# Patient Record
Sex: FEMALE | Race: WHITE | Employment: FULL TIME | URBAN - METROPOLITAN AREA
[De-identification: names, ages, dates, MRNs, and addresses within clinical notes are randomized per-mention and may not be internally consistent; named-entity substitution may affect disease eponyms.]

---

## 2017-01-16 ENCOUNTER — TELEPHONE (OUTPATIENT)
Dept: FAMILY MEDICINE | Facility: CLINIC | Age: 37
End: 2017-01-16

## 2017-01-16 NOTE — TELEPHONE ENCOUNTER
Patient called reporting she has mastitis on right side of left breast. Has tenderness and redness. Denies discharge or fever. Abx was prescribed by gynecologist. Viral symptoms improved but still having tenders now. Currently going thru menses symptoms. Asking for advise if OV needed. Advised pt to finish Rx for abx. Follow up with provider after menstrual cycle if symptoms persist or sooner if new symptoms. She verbalized understanding and agreement with plan.

## 2017-01-16 NOTE — TELEPHONE ENCOUNTER
Reason for call:  Patient reporting a symptom  Symptom or request: a condition patient is trying to fight  Duration (how long have symptoms been present): 1 week  Have you been treated for this before? no  Additional comments: please call patient   Phone Number patient can be reached at:  Home number on file [phone] (home)  Best Time:  any  Can we leave a detailed message on this number:  YES  Call taken on 1/16/2017 at 11:01 AM by VICENTA SOMERS

## 2017-01-20 ENCOUNTER — HOSPITAL ENCOUNTER (OUTPATIENT)
Dept: MAMMOGRAPHY | Facility: CLINIC | Age: 37
End: 2017-01-20
Attending: INTERNAL MEDICINE
Payer: COMMERCIAL

## 2017-01-20 ENCOUNTER — OFFICE VISIT (OUTPATIENT)
Dept: INTERNAL MEDICINE | Facility: CLINIC | Age: 37
End: 2017-01-20
Payer: COMMERCIAL

## 2017-01-20 ENCOUNTER — HOSPITAL ENCOUNTER (OUTPATIENT)
Dept: MAMMOGRAPHY | Facility: CLINIC | Age: 37
Discharge: HOME | End: 2017-01-20
Attending: INTERNAL MEDICINE | Admitting: INTERNAL MEDICINE
Payer: COMMERCIAL

## 2017-01-20 ENCOUNTER — TELEPHONE (OUTPATIENT)
Dept: INTERNAL MEDICINE | Facility: CLINIC | Age: 37
End: 2017-01-20

## 2017-01-20 VITALS
BODY MASS INDEX: 26.36 KG/M2 | TEMPERATURE: 98.5 F | DIASTOLIC BLOOD PRESSURE: 110 MMHG | HEIGHT: 69 IN | SYSTOLIC BLOOD PRESSURE: 160 MMHG | HEART RATE: 87 BPM | OXYGEN SATURATION: 98 % | WEIGHT: 178 LBS

## 2017-01-20 DIAGNOSIS — N61.0 ACUTE MASTITIS OF LEFT BREAST: Primary | ICD-10-CM

## 2017-01-20 DIAGNOSIS — N63.0 BREAST MASS: ICD-10-CM

## 2017-01-20 LAB
ALBUMIN SERPL-MCNC: 3.8 G/DL (ref 3.4–5)
ALP SERPL-CCNC: 94 U/L (ref 40–150)
ALT SERPL W P-5'-P-CCNC: 36 U/L (ref 0–50)
ANION GAP SERPL CALCULATED.3IONS-SCNC: 6 MMOL/L (ref 3–14)
AST SERPL W P-5'-P-CCNC: 30 U/L (ref 0–45)
BASOPHILS # BLD AUTO: 0 10E9/L (ref 0–0.2)
BASOPHILS NFR BLD AUTO: 0.3 %
BILIRUB SERPL-MCNC: 0.4 MG/DL (ref 0.2–1.3)
BUN SERPL-MCNC: 12 MG/DL (ref 7–30)
CALCIUM SERPL-MCNC: 8.9 MG/DL (ref 8.5–10.1)
CHLORIDE SERPL-SCNC: 105 MMOL/L (ref 94–109)
CO2 SERPL-SCNC: 30 MMOL/L (ref 20–32)
CREAT SERPL-MCNC: 0.75 MG/DL (ref 0.52–1.04)
DIFFERENTIAL METHOD BLD: NORMAL
EOSINOPHIL # BLD AUTO: 0.3 10E9/L (ref 0–0.7)
EOSINOPHIL NFR BLD AUTO: 2.7 %
ERYTHROCYTE [DISTWIDTH] IN BLOOD BY AUTOMATED COUNT: 12.1 % (ref 10–15)
GFR SERPL CREATININE-BSD FRML MDRD: 86 ML/MIN/1.7M2
GLUCOSE SERPL-MCNC: 142 MG/DL (ref 70–99)
HCT VFR BLD AUTO: 41.4 % (ref 35–47)
HGB BLD-MCNC: 13.2 G/DL (ref 11.7–15.7)
LYMPHOCYTES # BLD AUTO: 2.1 10E9/L (ref 0.8–5.3)
LYMPHOCYTES NFR BLD AUTO: 22.4 %
MCH RBC QN AUTO: 30.5 PG (ref 26.5–33)
MCHC RBC AUTO-ENTMCNC: 31.9 G/DL (ref 31.5–36.5)
MCV RBC AUTO: 96 FL (ref 78–100)
MONOCYTES # BLD AUTO: 0.6 10E9/L (ref 0–1.3)
MONOCYTES NFR BLD AUTO: 6.8 %
NEUTROPHILS # BLD AUTO: 6.2 10E9/L (ref 1.6–8.3)
NEUTROPHILS NFR BLD AUTO: 67.8 %
PLATELET # BLD AUTO: 358 10E9/L (ref 150–450)
POTASSIUM SERPL-SCNC: 3.8 MMOL/L (ref 3.4–5.3)
PROT SERPL-MCNC: 7.8 G/DL (ref 6.8–8.8)
RBC # BLD AUTO: 4.33 10E12/L (ref 3.8–5.2)
SODIUM SERPL-SCNC: 141 MMOL/L (ref 133–144)
WBC # BLD AUTO: 9.2 10E9/L (ref 4–11)

## 2017-01-20 PROCEDURE — G0204 DX MAMMO INCL CAD BI: HCPCS

## 2017-01-20 PROCEDURE — 76641 ULTRASOUND BREAST COMPLETE: CPT | Mod: LT

## 2017-01-20 PROCEDURE — 80053 COMPREHEN METABOLIC PANEL: CPT | Performed by: INTERNAL MEDICINE

## 2017-01-20 PROCEDURE — 36415 COLL VENOUS BLD VENIPUNCTURE: CPT | Performed by: INTERNAL MEDICINE

## 2017-01-20 PROCEDURE — 85025 COMPLETE CBC W/AUTO DIFF WBC: CPT | Performed by: INTERNAL MEDICINE

## 2017-01-20 PROCEDURE — 99214 OFFICE O/P EST MOD 30 MIN: CPT | Performed by: INTERNAL MEDICINE

## 2017-01-20 NOTE — PATIENT INSTRUCTIONS
Breast ultrasound and mammogram ASAP.    ---    I have placed a breast center referral for you.    Please schedule an appointment at your earliest convenience - phone number below.     If you have any difficulty getting an appointment, please let us know.     ---    Labs - please proceed to our first floor laboratory to have these drawn (show them your orange ticket).     Results:    If normal: we will release results in "[employer] or send them in the mail. You will not be called for these results.    If abnormal, but non-urgent: we will release results in [employer] or send them in the mail. You will not be called for these results.    If abnormal and urgent: we will call you.

## 2017-01-20 NOTE — TELEPHONE ENCOUNTER
Discussed results with patient.    All normal - no evidence of infection or inflammation.     Mammogram and ultrasound later today.    Recs to follow.

## 2017-01-20 NOTE — PROGRESS NOTES
"  SUBJECTIVE:                                                      HPI: Shannen Enamorado is a pleasant 36 year old female who presents with \"mastitis\"    Non-breastfeeding x 2 years.     Had mastitis while breast feeding and one time afterwards. Has always involved left central breast.     Re: current episode:  - started a ~1.5 weeks ago - thinks it was precipitated by lymphatic-focused massage (no massage of involved area, though), since it started a couple hours later  - involves same area as prior episodes of mastitis - left central breast  - describes as redness, warmth, and hardening of tissue   - area involved has been growing over time - started in area medial to left nipple   - now it feels like \"a hockey puck\" in my breast   - interestingly, not particularly painful - \"just a bit uncomfortable - like you'd expect with a hockey puck in in your breast\"   - nipple has become inverted over last week  - no nipple discharge  - no fevers or chills, but does endorse occasional drenching night sweats    - has been applying warm, wet compresses several times a day - doesn't seem to be helping  - saw plastic surgeon last week for Botox injections - showed him/her the breast - they prescribed course of Keflex (500mg 4x/day x 5 days)   - thinks this helped:    - redness improved, but admits that area involved continued to grow    - she felt \"less sick\" - patient has difficulty explaining - \"I just felt better\"  - requests repeat course of antibiotics     No personal or FH of breast or ovarian CA.     The medication, allergy, and problem lists have been reviewed and updated as appropriate.     OBJECTIVE:                                                      /110 mmHg  Pulse 87  Temp(Src) 98.5  F (36.9  C) (Oral)  Ht 5' 9\" (1.753 m)  Wt 178 lb (80.74 kg)  BMI 26.27 kg/m2  SpO2 98%  Constitutional: well-appearing  Right breast: normal appearance; no masses or skin retraction; no nipple discharge or bleeding; " no axillary lymphadenopathy  Left breast: nipple inverted with associated areolar deformity; large (~10x8cm), well-circumscribed mass palpated under central breast, with minimal overlying redness; patient non-tender on exam; no fluctuance or cystic structures palpated; no nipple discharge     ASSESSMENT/PLAN:                                                      (N61.0) Acute mastitis of left breast  (primary encounter diagnosis)  (N63) Breast mass  Comment:    - non-breast feeding.   - symptoms occurring in same areas of prior mastitis - left central breast.   - had mastitis while breast feeding and one time afterwards - this is the second episode after breastfeeding.   - completed course of Keflex last week - helped with redness and sense of well-being, but mass has continued to grow   - large, well-circumscribed mass under left central breast - non-tender and no fluctuance or cystic structures palpated.   - associated inverted nipple   - lengthy discussion with patient:     - her presentation is atypical for mastitis.    - I am not convinced that her presentation is infectious in nature.    - her mass and inverted nipple are concerning and warrant further work-up with mammogram and ultrasound.     - we need to rule out malignancy as well as other pathology like abscesses, fistulas, and cysts.     - differential includes infectious mastitis, jasmin-ductular fistula or abscess, idiopathic granulomatous mastitis, and malignancy.  Plan:    - left breast mammogram and ultrasound ASAP.   - CBC with diff and CMP today.   - referred to breast center - due to the atypical nature of her presentation, I think she should be evaluated by a breast specialist.     Patient is very upset by the proposed plan. Reports that she has a high-deductible insurance and cannot afford the testing and referrals recommended. Says she just came to me to get and antibiotic and wants one before leaving. Explained to patient that I understand her  frustration, but I am not confident that an antibiotic is appropriate yet. Additional work-up and evaluation as proposed is recommended. Patient agreeable but, understandably, upset.     The instructions on the AVS were discussed and explained to the patient. Patient expressed understanding of instructions.  A total of 25 minutes were spent face-to-face with this patient during this encounter and over half of that time was spent on counseling and coordination of care re: above diagnoses and plans of care.     Josselin Garcia MD   [address] [address]  T: [phone], F: [phone]

## 2017-01-20 NOTE — MR AVS SNAPSHOT
After Visit Summary   1/20/2017    Shannen Enamorado    MRN: [id number]           Patient Information     Date Of Birth          1980        Visit Information        Provider Department      1/20/2017 8:30 AM Josselin Garcia MD [facility]        Today's Diagnoses     Breast mass    -  1       Care Instructions    Breast ultrasound and mammogram ASAP.    ---    I have placed a breast center referral for you.    Please schedule an appointment at your earliest convenience - phone number below.     If you have any difficulty getting an appointment, please let us know.     ---    Labs - please proceed to our first floor laboratory to have these drawn (show them your orange ticket).     Results:    If normal: we will release results in MyChart or send them in the mail. You will not be called for these results.    If abnormal, but non-urgent: we will release results in MyChart or send them in the mail. You will not be called for these results.    If abnormal and urgent: we will call you.                      Follow-ups after your visit        Additional Services     BREAST CENTER REFERRAL       Your provider has referred you to: [facility]: [facility] Breanna Garnica [phone]   [email or web address]    Please be aware that coverage of these services is subject to the terms and limitations of your health insurance plan.  Call member services at your health plan with any benefit or coverage questions.      Please bring the following with you to your appointment:    (1) Any X-Rays, CTs or MRIs which have been performed.  Contact the facility where they were done to arrange for  prior to your scheduled appointment.    (2) List of current medications   (3) This referral request   (4) Any documents/labs given to you for this referral                  Future tests that were ordered for you today     Open Future Orders      "   Priority Expected Expires Ordered    MA Diagnostic Digital Left Today  2018    US Breast Left Complete 4 Quadrants Routine  2018            Who to contact     If you have questions or need follow up information about today's clinic visit or your schedule please contact [facility] directly at [phone].  Normal or non-critical lab and imaging results will be communicated to you by MyChart, letter or phone within 4 business days after the clinic has received the results. If you do not hear from us within 7 days, please contact the clinic through [employer] or phone. If you have a critical or abnormal lab result, we will notify you by phone as soon as possible.  Submit refill requests through [employer] or call your pharmacy and they will forward the refill request to us. Please allow 3 business days for your refill to be completed.          Additional Information About Your Visit        [employer] Information     [employer] lets you send messages to your doctor, view your test results, renew your prescriptions, schedule appointments and more. To sign up, go to [email or web address] . Click on \"Log in\" on the left side of the screen, which will take you to the Welcome page. Then click on \"Sign up Now\" on the right side of the page.     You will be asked to enter the access code listed below, as well as some personal information. Please follow the directions to create your username and password.     Your access code is: [email or web address]  Expires: 2017  8:54 AM     Your access code will  in 90 days. If you need help or a new code, please call your [facility] clinic or [phone].        Care EveryWhere ID     This is your Care EveryWhere ID. This could be used by other organizations to access your [facility] medical records  [id number]        Your Vitals Were     Pulse Temperature Height BMI (Body Mass Index) Pulse Oximetry       87 98.5  F (36.9  C) (Oral) 5' 9\" " (1.753 m) 26.27 kg/m2 98%        Blood Pressure from Last 3 Encounters:   01/20/17 160/110   01/14/16 139/80   12/22/15 128/74    Weight from Last 3 Encounters:   01/20/17 178 lb (80.74 kg)   01/14/16 175 lb (79.379 kg)   12/22/15 175 lb 8 oz (79.606 kg)              We Performed the Following     BREAST CENTER REFERRAL     CBC with platelets     Comprehensive metabolic panel        Primary Care Provider Office Phone # Fax #    Swapna Rosas [phone]       [facility] OB GYN CONSULT [address]  [facility] [address]        Thank you!     Thank you for choosing [facility]  for your care. Our goal is always to provide you with excellent care. Hearing back from our patients is one way we can continue to improve our services. Please take a few minutes to complete the written survey that you may receive in the mail after your visit with us. Thank you!             Your Updated Medication List - Protect others around you: Learn how to safely use, store and throw away your medicines at www.disposemymeds.org.          This list is accurate as of: 1/20/17  8:54 AM.  Always use your most recent med list.                   Brand Name Dispense Instructions for use    ALEVE 220 MG tablet   Generic drug:  naproxen sodium      Take by mouth as needed       amoxicillin-clavulanate 875-125 MG per tablet    AUGMENTIN    14 tablet    Take 1 tablet by mouth 2 times daily       augmented betamethasone dipropionate 0.05 % cream    DIPROLENE-AF    50 g    Apply sparingly once or twice per day as needed to affected area until the skin is better, then stop.  Do not apply to face       cephALEXin 500 MG capsule    KEFLEX    20 capsule    Take 1 capsule (500 mg) by mouth 2 times daily       cod liver oil Caps capsule          HYDROcodone-acetaminophen 5-500 MG per tablet    VICODIN     Take 1-2 tablets by mouth every 6 hours as needed for moderate to severe pain       PRENATAL VITAMINS  PO      Take 1 tablet by mouth.       SUMAtriptan 50 MG tablet    IMITREX    6 tablet    Take 1 tablet (50 mg) by mouth at onset of headache for migraine May repeat another dose every 2-3 hours up to 3 extra doses.  Do not exceed 200 mg in 24 hours       triamcinolone 0.5 % cream    KENALOG     Apply topically 2 times daily

## 2017-01-29 ENCOUNTER — HOSPITAL ENCOUNTER (EMERGENCY)
Facility: CLINIC | Age: 37
Discharge: HOME | End: 2017-01-29
Attending: EMERGENCY MEDICINE | Admitting: EMERGENCY MEDICINE
Payer: COMMERCIAL

## 2017-01-29 ENCOUNTER — APPOINTMENT (OUTPATIENT)
Dept: ULTRASOUND IMAGING | Facility: CLINIC | Age: 37
End: 2017-01-29
Attending: EMERGENCY MEDICINE
Payer: COMMERCIAL

## 2017-01-29 VITALS
TEMPERATURE: 98.8 F | SYSTOLIC BLOOD PRESSURE: 148 MMHG | HEART RATE: 97 BPM | DIASTOLIC BLOOD PRESSURE: 100 MMHG | RESPIRATION RATE: 18 BRPM | OXYGEN SATURATION: 99 %

## 2017-01-29 DIAGNOSIS — N61.0 INFLAMMATORY DISEASE OF LEFT BREAST: ICD-10-CM

## 2017-01-29 DIAGNOSIS — N61.0 CELLULITIS OF LEFT BREAST: ICD-10-CM

## 2017-01-29 LAB
ANION GAP SERPL CALCULATED.3IONS-SCNC: 8 MMOL/L (ref 3–14)
BASOPHILS # BLD AUTO: 0 10E9/L (ref 0–0.2)
BASOPHILS NFR BLD AUTO: 0.2 %
BUN SERPL-MCNC: 10 MG/DL (ref 7–30)
CALCIUM SERPL-MCNC: 9 MG/DL (ref 8.5–10.1)
CHLORIDE SERPL-SCNC: 102 MMOL/L (ref 94–109)
CO2 SERPL-SCNC: 25 MMOL/L (ref 20–32)
CREAT SERPL-MCNC: 0.79 MG/DL (ref 0.52–1.04)
DIFFERENTIAL METHOD BLD: ABNORMAL
EOSINOPHIL # BLD AUTO: 0.1 10E9/L (ref 0–0.7)
EOSINOPHIL NFR BLD AUTO: 0.6 %
ERYTHROCYTE [DISTWIDTH] IN BLOOD BY AUTOMATED COUNT: 12 % (ref 10–15)
GFR SERPL CREATININE-BSD FRML MDRD: 82 ML/MIN/1.7M2
GLUCOSE SERPL-MCNC: 126 MG/DL (ref 70–99)
HCT VFR BLD AUTO: 37.2 % (ref 35–47)
HGB BLD-MCNC: 12.7 G/DL (ref 11.7–15.7)
IMM GRANULOCYTES # BLD: 0.1 10E9/L (ref 0–0.4)
IMM GRANULOCYTES NFR BLD: 0.5 %
LACTATE BLD-SCNC: 1.1 MMOL/L (ref 0.7–2.1)
LYMPHOCYTES # BLD AUTO: 1.5 10E9/L (ref 0.8–5.3)
LYMPHOCYTES NFR BLD AUTO: 9.9 %
MCH RBC QN AUTO: 31.2 PG (ref 26.5–33)
MCHC RBC AUTO-ENTMCNC: 34.1 G/DL (ref 31.5–36.5)
MCV RBC AUTO: 91 FL (ref 78–100)
MONOCYTES # BLD AUTO: 1.4 10E9/L (ref 0–1.3)
MONOCYTES NFR BLD AUTO: 9 %
NEUTROPHILS # BLD AUTO: 12.1 10E9/L (ref 1.6–8.3)
NEUTROPHILS NFR BLD AUTO: 79.8 %
NRBC # BLD AUTO: 0 10*3/UL
NRBC BLD AUTO-RTO: 0 /100
PLATELET # BLD AUTO: 290 10E9/L (ref 150–450)
POTASSIUM SERPL-SCNC: 3.3 MMOL/L (ref 3.4–5.3)
RBC # BLD AUTO: 4.07 10E12/L (ref 3.8–5.2)
SODIUM SERPL-SCNC: 135 MMOL/L (ref 133–144)
WBC # BLD AUTO: 15.2 10E9/L (ref 4–11)

## 2017-01-29 PROCEDURE — 25000125 ZZHC RX 250: Performed by: EMERGENCY MEDICINE

## 2017-01-29 PROCEDURE — 96365 THER/PROPH/DIAG IV INF INIT: CPT

## 2017-01-29 PROCEDURE — 25000128 H RX IP 250 OP 636: Performed by: EMERGENCY MEDICINE

## 2017-01-29 PROCEDURE — 85025 COMPLETE CBC W/AUTO DIFF WBC: CPT | Performed by: EMERGENCY MEDICINE

## 2017-01-29 PROCEDURE — 83605 ASSAY OF LACTIC ACID: CPT | Performed by: EMERGENCY MEDICINE

## 2017-01-29 PROCEDURE — 76642 ULTRASOUND BREAST LIMITED: CPT | Mod: LT

## 2017-01-29 PROCEDURE — 80048 BASIC METABOLIC PNL TOTAL CA: CPT | Performed by: EMERGENCY MEDICINE

## 2017-01-29 PROCEDURE — 36415 COLL VENOUS BLD VENIPUNCTURE: CPT

## 2017-01-29 PROCEDURE — 99285 EMERGENCY DEPT VISIT HI MDM: CPT | Mod: 25

## 2017-01-29 PROCEDURE — 96361 HYDRATE IV INFUSION ADD-ON: CPT

## 2017-01-29 PROCEDURE — 87040 BLOOD CULTURE FOR BACTERIA: CPT | Mod: 59 | Performed by: EMERGENCY MEDICINE

## 2017-01-29 RX ORDER — SULFAMETHOXAZOLE/TRIMETHOPRIM 800-160 MG
1 TABLET ORAL 2 TIMES DAILY
Qty: 20 TABLET | Refills: 0 | Status: ON HOLD | OUTPATIENT
Start: 2017-01-29 | End: 2017-02-03

## 2017-01-29 RX ORDER — CEPHALEXIN 500 MG/1
500 CAPSULE ORAL 4 TIMES DAILY
Qty: 40 CAPSULE | Refills: 0 | Status: ON HOLD | OUTPATIENT
Start: 2017-01-29 | End: 2017-02-03

## 2017-01-29 RX ORDER — CEFAZOLIN SODIUM 1 G/3ML
1 INJECTION, POWDER, FOR SOLUTION INTRAMUSCULAR; INTRAVENOUS ONCE
Status: COMPLETED | OUTPATIENT
Start: 2017-01-29 | End: 2017-01-29

## 2017-01-29 RX ADMIN — CEFAZOLIN SODIUM 1 G: 1 INJECTION, POWDER, FOR SOLUTION INTRAMUSCULAR; INTRAVENOUS at 17:01

## 2017-01-29 RX ADMIN — SODIUM CHLORIDE 1000 ML: 9 INJECTION, SOLUTION INTRAVENOUS at 15:32

## 2017-01-29 ASSESSMENT — ENCOUNTER SYMPTOMS
BACK PAIN: 1
COLOR CHANGE: 1
FEVER: 0

## 2017-01-29 NOTE — ED PROVIDER NOTES
"  History     Chief Complaint:  Breast pain     HPI   Shannen Enamorado is a 36 year old female who presents with breast pain. The patient states that she has a history of mastitis, with an episode occuring when she was breastfeeding in the past, and another episode 10 months after she ceased breastfeeding. Beginning approximately three weeks ago, 1/8, she describes pain in the area that is similar to pain that she experienced during a previous episode of mastitis. On 1/16, the patient saw her plastic surgeon for a Botox injection. Additionally, during this time, she was prescribed a five day course of Keflex for her suspected mastitis. The patient describes no improvement of her symptoms, and notes the gradual enlargement of a mass into the size of a \"hockey puck\" on her left breast as well as reddening of the region. Therefore, on Friday, 1/20, she went to the [facility] clinic to be evaluated. The patient was referred to the associated breast center, where blood work was performed, which was negative. A mammogram and US were done as well, and after reviewing the results, the patient was told to wait to see how her symptoms progress. On Monday and Tuesday, 1/23 and 1/24, she reports that she felt that the pain was \"easing up\", however, on Thursday night, 1/26, after using a warm compress, her pain worsened. The following morning, the patient called the breast center and told them of her progression, and is scheduled for an appointment tomorrow, 1/30, at 0830. During this past week, the patient endorses the use of cold compresses as well as ibuprofen, which the patient states is effective in alleviating her pain. Her most recent dosage of ibuprofen was one tablet, and which she took an hour prior to presentation. Additionally, she notes that as a result of her pain, she has not been sleeping well and has been diaphoretic for the past two nights, and has had an intermittent fever as well during this time, " however, on presentation, her temperature is 98.8F. She describes generalized muscle aches in her back, as well as wrapping chest pain into her mid sternal area, and ankle and feet pain.      Allergies:  NKDA     Medications:    cephALEXin (KEFLEX) 500 MG capsule  triamcinolone (KENALOG) 0.5 % cream  amoxicillin-clavulanate (AUGMENTIN) 875-125 MG per tablet  augmented betamethasone dipropionate (DIPROLENE-AF) 0.05 % cream  HYDROcodone-acetaminophen (VICODIN) 5-500 MG per tablet  SUMAtriptan (IMITREX) 50 MG tablet  naproxen sodium (ALEVE) 220 MG tablet     Past Medical History:    Abnormal pap smear  Herpes simplex  migraine    Past Surgical History:    The patient does not have any pertinent past surgical history.    Family History:    Mother is positive for hypertension and migraines    Social History:  Former smoker  Positive for alcohol use.   Accompanied by female acquaintance.   Marital Status:  Single [1]     Review of Systems   Constitutional: Negative for fever.   Musculoskeletal: Positive for back pain.        Positive for ankle and feet pain  Positive for mid-sternal pain   Skin: Positive for color change (redness of left breast).        Positive for mass on the left breast     All other systems reviewed and are negative.      Physical Exam   First Vitals:  Patient Vitals for the past 24 hrs:   BP Temp Temp src Pulse Heart Rate Resp SpO2   01/29/17 1719 - - - - - - 99 %   01/29/17 1629 - - - - - - 98 %   01/29/17 1602 - - - - - - 100 %   01/29/17 1601 (!) 148/100 mmHg - - - - - 96 %   01/29/17 1542 - - - 97 97 - 97 %   01/29/17 1504 (!) 155/102 mmHg 98.8  F (37.1  C) Oral 125 125 18 100 %        Physical Exam  General: Patient is alert and normal appearing.  HEENT: Head atraumatic    Eyes: pupils equal and reactive. Conjunctiva clear   Nares: patent   Oropharynx: no lesions, uvula midline, no palatal draping, normal voice, no trismus  Neck: Supple without lymphadenopathy, no meningismus  Chest: tachycardic  but regular.  Left breast with erythema, induration, inverted nipple, no discharge or drainage, tenderness and warmth   Lungs: Equal clear to auscultation with no wheeze or rales  Abdomen: Soft, non tender, nondistended, normal bowel sounds  Back: No costovertebral angle tenderness, no midline C, T or L spine tenderness  Neuro: Grossly nonfocal, normal speech, strength equal bilaterally, CN 2-12 intact  Extremities: No deformities, equal radial and DP pulses. No clubbing, cyanosis.  No edema  Skin: Warm and dry with no rash.       Emergency Department Course   Imaging:  Radiographic findings were communicated with the patient who voiced understanding of the findings.    US left breast:  Targeted ultrasound evaluation of the left upper inner breast again demonstrates overlying skin thickening with subcutaneous edema and heterogeneous background echotexture. No definite fluid  collection is appreciated. As per radiology.     Laboratory:  CBC: WBC: 15.2 (H), HGB: 12.7 (H), PLT: 290  BMP: Glucose 126 (H), potassium 3.3 (L), o/w WNL (Creatinine: 0.79)    Lactic acid: 1.1  1543 Blood culture: pending   1618 Blood culture: pending    Interventions:  1532 NS 1000 mL IV  1701 Ancef 1 g IV    Emergency Department Course:  Nursing notes and vitals reviewed.  I performed an exam of the patient as documented above.     Blood drawn. This was sent to the lab for further testing, results above.    1540 I consulted with Dr. Meade, radiology, in regards to the patient.     The patient was sent for a left breast US while in the emergency department, findings above.     1656 I consulted with Dr. Azevedo, general surgery, in regards to the patient.    1659 I reevaluated the patient and provided an update in regards to her ED course.      Findings and plan explained to the Patient. Patient discharged home with instructions regarding supportive care, medications, and reasons to return. The importance of close follow-up was reviewed. The  patient was prescribed Keflex, 500 mg 4 times daily for 10 days, and Bactrim, 1 tablet 2 times daily for 10 days.     I personally reviewed the laboratory results with the Patient and answered all related questions prior to discharge.     Impression & Plan      Medical Decision Making:  Shannen Enamorado is a 36 year old female who presented with red, warm, swollen, painful left breast. She was initially treated for mastitis, and had a five day course of Keflex that seemed to improve symptoms. However, her symptoms have gotten worse since then. She was seen in the breast center, and had a mammogram and US done, which showed inflammatory changes of the left breast. She is scheduled for follow up tomorrow for a biopsy, but she had increasing pain and warmth as well as generalized myalgias today that prompted her to present to the ED. Here, her WBC is elevated at 15. She was initially tachycardic at 125, but this improved with fluid hydration. Her lactic acid is 1.1, and within the acceptable range, making her symptoms inconsistent with sepsis at this time. Glucose was mildly elevated at 126. Repeat US does not show a focal abscess of the left breast. There is overlying skin thickening with subcutaneous edema and heterogeneous background echotexture. No definite fluid collection is appreciated. I discussed with Dr. Azevedo, on call for surgery. He felt that it was appropriate for her to keep her follow up appointment in the breast cancer or follow up with one of the breast surgeons specifically. I have provided her a name of a breast surgeon. She has been off antibiotics for about a week and a half, making this unlikely to be a failure of oral antibiotics. Given that she is not septic at this time, I think it is reasonable to attempt oral antibiotics. I am giving her a dose of   Ancef here, and I will discharge her with Keflex and Bactrim. She has an appointment in about 15 hours with the breast center.  I offered  needle aspiration of the area that looks most fluctuant but without focal abscess on ultrasound, follow up appointment with breast surgeon in the morning the patient and I feel it is prudent to allow them to decide how they wish to proceed. At that time they can determine if they want to attempt to drain this or just biopsy it.   If she develops any worsening symptoms, I have reccommended that she return the ED for re-evaluation. She expressed agreement and understanding of this plan, and all questions and concerns were addressed at the time of discharge.     Diagnosis:    ICD-10-CM    1. Cellulitis of left breast N61.0    2. Inflammatory disease of left breast N61.0        Discharge Medications:  Discharge Medication List as of 1/29/2017  5:41 PM      START taking these medications    Details   !! cephALEXin (KEFLEX) 500 MG capsule Take 1 capsule (500 mg) by mouth 4 times daily for 10 days, Disp-40 capsule, R-0, Local Print      sulfamethoxazole-trimethoprim (BACTRIM DS) 800-160 MG per tablet Take 1 tablet by mouth 2 times daily for 10 days, Disp-20 tablet, R-0, Local Print       !! - Potential duplicate medications found. Please discuss with provider.        I, Samuel Salinas, am serving as a scribe on 1/29/2017 at 3:06 PM to personally document services performed by Marisol Estevez MD based on my observations and the provider's statements to me.     Samuel Salians  1/29/2017    EMERGENCY DEPARTMENT        Marisol Estevez MD  01/29/17 6650

## 2017-01-29 NOTE — ED AVS SNAPSHOT
Emergency Department    [address] [facility] [address]    Phone:  [phone]    Fax:  [phone]                                       Shannen Enamorado   MRN: [id number]    Department:   Emergency Department   Date of Visit:  1/29/2017           After Visit Summary Signature Page     I have received my discharge instructions, and my questions have been answered. I have discussed any challenges I see with this plan with the nurse or doctor.    ..........................................................................................................................................  Patient/Patient Representative Signature      ..........................................................................................................................................  Patient Representative Print Name and Relationship to Patient    ..................................................               ................................................  Date                                            Time    ..........................................................................................................................................  Reviewed by Signature/Title    ...................................................              ..............................................  Date                                                            Time

## 2017-01-29 NOTE — ED AVS SNAPSHOT
Emergency Department    [address] [facility] [address]    Phone:  [phone]    Fax:  [phone]                                       Shannen Enamorado   MRN: [id number]    Department:   Emergency Department   Date of Visit:  1/29/2017           Patient Information     Date Of Birth          1980        Your diagnoses for this visit were:     Cellulitis of left breast     Inflammatory disease of left breast        You were seen by Marisol Estevez MD.      Follow-up Information     Follow up with your appointment at the breast center tomorrow.        Follow up with  Emergency Department.    Specialty:  EMERGENCY MEDICINE    Why:  If symptoms worsen    Contact information:    [address] [facility] [address] [phone]        Follow up with Kay Bowen MD.    Specialty:  Surgery    Contact information:    [facility]  [address] [id number] [phone]        Discharge References/Attachments     CELLULITIS, DISCHARGE INSTRUCTIONS FOR (ENGLISH)      Future Appointments        Provider Department Dept Phone Center    1/30/2017 8:30 AM [facility] ULTRASOUND ROOM 1 [facility] [phone] [facility]    1/30/2017 9:00 AM  Breast Radiologist;  Breast Nurse; [facility] ULTRASOUND ROOM 1 [facility] [phone] [facility]    1/30/2017 9:45 AM [facility] MAMMO ROOM 1 [facility] [phone] [facility]      24 Hour Appointment Hotline       To make an appointment at any [facility], call [phone] ([phone]). If you don't have a family doctor or clinic, we will help you find one. [facility] clinics are conveniently located to serve the needs of you and your family.             Review of your medicines      START taking        Dose / Directions Last dose taken    sulfamethoxazole-trimethoprim  800-160 MG per tablet   Commonly known as:  BACTRIM DS   Dose:  1 tablet   Quantity:  20 tablet        Take 1 tablet by mouth 2 times daily for 10 days   Refills:  0          CONTINUE these medicines which may have CHANGED, or have new prescriptions. If we are uncertain of the size of tablets/capsules you have at home, strength may be listed as something that might have changed.        Dose / Directions Last dose taken    * cephALEXin 500 MG capsule   Commonly known as:  KEFLEX   Dose:  500 mg   What changed:  Another medication with the same name was added. Make sure you understand how and when to take each.   Quantity:  20 capsule        Take 1 capsule (500 mg) by mouth 2 times daily   Refills:  0        * cephALEXin 500 MG capsule   Commonly known as:  KEFLEX   Dose:  500 mg   What changed:  You were already taking a medication with the same name, and this prescription was added. Make sure you understand how and when to take each.   Quantity:  40 capsule        Take 1 capsule (500 mg) by mouth 4 times daily for 10 days   Refills:  0        * Notice:  This list has 2 medication(s) that are the same as other medications prescribed for you. Read the directions carefully, and ask your doctor or other care provider to review them with you.      Our records show that you are taking the medicines listed below. If these are incorrect, please call your family doctor or clinic.        Dose / Directions Last dose taken    ALEVE 220 MG tablet   Generic drug:  naproxen sodium        Take by mouth as needed   Refills:  0        amoxicillin-clavulanate 875-125 MG per tablet   Commonly known as:  AUGMENTIN   Dose:  1 tablet   Quantity:  14 tablet        Take 1 tablet by mouth 2 times daily   Refills:  0        augmented betamethasone dipropionate 0.05 % cream   Commonly known as:  DIPROLENE-AF   Quantity:  50 g        Apply sparingly once or twice per day as needed to affected area until the skin is better, then stop.  Do not  apply to face   Refills:  3        cod liver oil Caps capsule        Refills:  0        HYDROcodone-acetaminophen 5-500 MG per tablet   Commonly known as:  VICODIN   Dose:  1-2 tablet        Take 1-2 tablets by mouth every 6 hours as needed for moderate to severe pain   Refills:  0        PRENATAL VITAMINS PO   Dose:  1 tablet        Take 1 tablet by mouth.   Refills:  0        SUMAtriptan 50 MG tablet   Commonly known as:  IMITREX   Dose:  50 mg   Quantity:  6 tablet        Take 1 tablet (50 mg) by mouth at onset of headache for migraine May repeat another dose every 2-3 hours up to 3 extra doses.  Do not exceed 200 mg in 24 hours   Refills:  0        triamcinolone 0.5 % cream   Commonly known as:  KENALOG        Apply topically 2 times daily   Refills:  0                Prescriptions were sent or printed at these locations (2 Prescriptions)                   Other Prescriptions                Printed at Department/Unit printer (2 of 2)         cephALEXin (KEFLEX) 500 MG capsule               sulfamethoxazole-trimethoprim (BACTRIM DS) 800-160 MG per tablet                Procedures and tests performed during your visit     Procedure/Test Number of Times Performed    Basic metabolic panel 1    Blood culture 2    CBC with platelets differential 1    Lactic acid whole blood 1    US Breast Left 1      Orders Needing Specimen Collection     None      Pending Results     Date and Time Order Name Status Description    1/29/2017 1522 Blood culture In process     1/29/2017 1522 Blood culture In process             Pending Culture Results     Date and Time Order Name Status Description    1/29/2017 1522 Blood culture In process     1/29/2017 1522 Blood culture In process        Test Results from your hospital stay           1/29/2017  3:46 PM - Interface, Flexilab Results      Component Results     Component Value Ref Range & Units Status    WBC 15.2 (H) 4.0 - 11.0 10e9/L Final    RBC Count 4.07 3.8 - 5.2 10e12/L Final     Hemoglobin 12.7 11.7 - 15.7 g/dL Final    Hematocrit 37.2 35.0 - 47.0 % Final    MCV 91 78 - 100 fl Final    MCH 31.2 26.5 - 33.0 pg Final    MCHC 34.1 31.5 - 36.5 g/dL Final    RDW 12.0 10.0 - 15.0 % Final    Platelet Count 290 150 - 450 10e9/L Final    Diff Method Automated Method  Final    % Neutrophils 79.8 % Final    % Lymphocytes 9.9 % Final    % Monocytes 9.0 % Final    % Eosinophils 0.6 % Final    % Basophils 0.2 % Final    % Immature Granulocytes 0.5 % Final    Nucleated RBCs 0 0 /100 Final    Absolute Neutrophil 12.1 (H) 1.6 - 8.3 10e9/L Final    Absolute Lymphocytes 1.5 0.8 - 5.3 10e9/L Final    Absolute Monocytes 1.4 (H) 0.0 - 1.3 10e9/L Final    Absolute Eosinophils 0.1 0.0 - 0.7 10e9/L Final    Absolute Basophils 0.0 0.0 - 0.2 10e9/L Final    Abs Immature Granulocytes 0.1 0 - 0.4 10e9/L Final    Absolute Nucleated RBC 0.0  Final         1/29/2017  3:52 PM - Interface, Flexilab Results      Component Results     Component Value Ref Range & Units Status    Lactic Acid 1.1 0.7 - 2.1 mmol/L Final         1/29/2017  4:02 PM - Interface, Flexilab Results      Component Results     Component Value Ref Range & Units Status    Sodium 135 133 - 144 mmol/L Final    Potassium 3.3 (L) 3.4 - 5.3 mmol/L Final    Chloride 102 94 - 109 mmol/L Final    Carbon Dioxide 25 20 - 32 mmol/L Final    Anion Gap 8 3 - 14 mmol/L Final    Glucose 126 (H) 70 - 99 mg/dL Final    Urea Nitrogen 10 7 - 30 mg/dL Final    Creatinine 0.79 0.52 - 1.04 mg/dL Final    GFR Estimate 82 >60 mL/min/1.7m2 Final    Non  GFR Calc    GFR Estimate If Black >90   GFR Calc   >60 mL/min/1.7m2 Final    Calcium 9.0 8.5 - 10.1 mg/dL Final         1/29/2017  3:43 PM - Interface, Flexilab Results         1/29/2017  4:18 PM - Interface, Flexilab Results         1/29/2017  4:14 PM - Interface, Radiant Ib      Narrative     ULTRASOUND LEFT BREAST - 1/29/2017 4:01 PM    HISTORY: Possible left breast abscess.    COMPARISON:   January 20, 2017.    FINDINGS: Targeted ultrasound evaluation of the left upper inner  breast again demonstrates overlying skin thickening with subcutaneous  edema and heterogeneous background echotexture. No definite fluid  collection is appreciated.         Impression     IMPRESSION: BI-RADS CATEGORY: 3 - Probably Benign Finding-Short  Interval Follow-Up Suggested.    RECOMMENDED FOLLOW-UP: Short Interval Follow-up 1-2 weeks.  No definite abscess appreciated. Findings could reflect edema or  infection, although inflammatory carcinoma cannot be excluded.  Recommend clinical correlation and follow-up ultrasound imaging after  appropriate treatment. If symptoms persist, consider punch biopsy for  diagnosis.    TENISHA SEGAL MD                Clinical Quality Measure: Blood Pressure Screening     Your blood pressure was checked while you were in the emergency department today. The last reading we obtained was  BP: (!) 148/100 mmHg . Please read the guidelines below about what these numbers mean and what you should do about them.  If your systolic blood pressure (the top number) is less than 120 and your diastolic blood pressure (the bottom number) is less than 80, then your blood pressure is normal. There is nothing more that you need to do about it.  If your systolic blood pressure (the top number) is 120-139 or your diastolic blood pressure (the bottom number) is 80-89, your blood pressure may be higher than it should be. You should have your blood pressure rechecked within a year by a primary care provider.  If your systolic blood pressure (the top number) is 140 or greater or your diastolic blood pressure (the bottom number) is 90 or greater, you may have high blood pressure. High blood pressure is treatable, but if left untreated over time it can put you at risk for heart attack, stroke, or kidney failure. You should have your blood pressure rechecked by a primary care provider within the next 4 weeks.  If your  "provider in the emergency department today gave you specific instructions to follow-up with your doctor or provider even sooner than that, you should follow that instruction and not wait for up to 4 weeks for your follow-up visit.        Thank you for choosing [facility]       Thank you for choosing [facility] for your care. Our goal is always to provide you with excellent care. Hearing back from our patients is one way we can continue to improve our services. Please take a few minutes to complete the written survey that you may receive in the mail after you visit with us. Thank you!        Naldo Information     Naldo lets you send messages to your doctor, view your test results, renew your prescriptions, schedule appointments and more. To sign up, go to [email or web address] . Click on \"Log in\" on the left side of the screen, which will take you to the Welcome page. Then click on \"Sign up Now\" on the right side of the page.     You will be asked to enter the access code listed below, as well as some personal information. Please follow the directions to create your username and password.     Your access code is: [email or web address]  Expires: 2017  8:54 AM     Your access code will  in 90 days. If you need help or a new code, please call your [facility] clinic or [phone].        Care EveryWhere ID     This is your Care EveryWhere ID. This could be used by other organizations to access your [facility] medical records  [id number]        After Visit Summary       This is your record. Keep this with you and show to your community pharmacist(s) and doctor(s) at your next visit.                  "

## 2017-01-30 ENCOUNTER — HOSPITAL ENCOUNTER (OUTPATIENT)
Dept: MAMMOGRAPHY | Facility: CLINIC | Age: 37
Discharge: HOME | End: 2017-01-30
Attending: INTERNAL MEDICINE | Admitting: INTERNAL MEDICINE
Payer: COMMERCIAL

## 2017-01-30 ENCOUNTER — HOSPITAL ENCOUNTER (OUTPATIENT)
Dept: MAMMOGRAPHY | Facility: CLINIC | Age: 37
End: 2017-01-30
Attending: INTERNAL MEDICINE
Payer: COMMERCIAL

## 2017-01-30 DIAGNOSIS — N63.0 BREAST MASS: ICD-10-CM

## 2017-01-30 LAB
GRAM STN SPEC: NORMAL
MICRO REPORT STATUS: NORMAL
SPECIMEN SOURCE: NORMAL

## 2017-01-30 PROCEDURE — 76642 ULTRASOUND BREAST LIMITED: CPT | Mod: LT

## 2017-01-30 PROCEDURE — 87077 CULTURE AEROBIC IDENTIFY: CPT | Performed by: INTERNAL MEDICINE

## 2017-01-30 PROCEDURE — 87070 CULTURE OTHR SPECIMN AEROBIC: CPT | Performed by: INTERNAL MEDICINE

## 2017-01-30 PROCEDURE — 87205 SMEAR GRAM STAIN: CPT | Performed by: INTERNAL MEDICINE

## 2017-01-30 PROCEDURE — 76942 ECHO GUIDE FOR BIOPSY: CPT | Mod: XU

## 2017-01-30 PROCEDURE — 25000125 ZZHC RX 250: Performed by: INTERNAL MEDICINE

## 2017-01-30 RX ADMIN — LIDOCAINE HYDROCHLORIDE 5 ML: 10 INJECTION, SOLUTION INFILTRATION; PERINEURAL at 09:28

## 2017-01-30 NOTE — LETTER
[facility]  [address]  [facility] [address]                                                                                                                                  Shannen Enamorado  [address]  [facility] [address]    January 30, 2017    Date of Exam:       Dear Shannen:    Thank you for your recent visit.    Your breast imaging examination showed an area that we believe is benign (not cancer). We recommend that you come back again in 2 weeks unless you are getting worse.    As you know, early detection of cancer is very important. Although mammography is the most accurate method for early detection, not all cancers are found through mammography. A thorough examination includes a combination of mammography and a physical examination by your provider. Therefore, if you have not had a recent physical exam of your breasts see your health care provider.    A report of your breast imaging results was sent to: Josselin Garcia    Your breast imaging will become part of your medical file here at [facility] for at least 10 years. You are responsible for informing any new health care provider or mammography facility of the date and location of this examination.    We appreciate the opportunity to participate in your health care.    Sincerely,    Marcelo Quintero MD  Interpreting Radiologist  [facility]

## 2017-01-30 NOTE — DISCHARGE INSTRUCTIONS
Bleeding or bruising: Slight bruising is normal.  If you bleed through the bandage, put direct pressure on the breast.  If you are still bleeding after 20 minutes, call the doctor who ordered the exam.    Bandages: Keep your bandage in place until tomorrow morning.  Do not get it wet.     Activity: You may shower the morning after the exam.  No heavy activity (lifting, vacuuming) for 24 hours.    Discomfort: Wear your bra overnight to support the breast.  You may take Tylenol (acetaminophen) for pain.    Results: Results may take up to three business days.  If you have not heard your results in three days, call the Breast Center Nurse at [phone] or [phone].      Call the doctor who ordered your exam if:    You have bleeding that lasts more than 20 minutes.    You have pain that cannot be controlled.    You have signs of infection (fever, redness, drainage or other signs).    You have not had your results within three days.    Nurse navigator: Our nurse navigator is here to answer your questions and help you set up future clinic visits.  Please call [phone].    Thank you for choosing [facility].  Please call us if you have questions or concerns about your cyst aspiration.  . Follow up at [facility] in 2 weeks or if symptoms worsen.

## 2017-01-31 ENCOUNTER — TELEPHONE (OUTPATIENT)
Dept: MAMMOGRAPHY | Facility: CLINIC | Age: 37
End: 2017-01-31

## 2017-02-01 ENCOUNTER — TELEPHONE (OUTPATIENT)
Dept: MAMMOGRAPHY | Facility: CLINIC | Age: 37
End: 2017-02-01

## 2017-02-01 NOTE — TELEPHONE ENCOUNTER
Patient seen at [facility] for breast abscess and drain- culture still no growth, on antibiotics since 1/29/17. Yesterday (1/31) we spoke and due to increasing pain and hardness we set her up with breast surgeon Dr Mcdonnell 2/2/17 for ongoing management.    Last evening patient used warm compresses and an herbal oil on the breast and now reports the skin has torn and pealed away, she is having bloody yellow drainage, but feels better- less swelling and less pain or hardness of breast. Told her breast surgeon is still best option for managing this abscess/ now skin wound and offered to have her seen today. She declined appointment today- states no fever, no change in redness. Stated she would cover area with telfa bandage and keep Dr Mcdonnell appointment tomorrow. Told her imperative to also keep taking oral antibiotics as prescribed. Patient stated comfortable with plan.

## 2017-02-02 ENCOUNTER — HOSPITAL ENCOUNTER (OUTPATIENT)
Dept: MAMMOGRAPHY | Facility: CLINIC | Age: 37
Discharge: HOME | End: 2017-02-02
Attending: SURGERY | Admitting: SURGERY
Payer: COMMERCIAL

## 2017-02-02 ENCOUNTER — OFFICE VISIT (OUTPATIENT)
Dept: SURGERY | Facility: CLINIC | Age: 37
End: 2017-02-02
Payer: COMMERCIAL

## 2017-02-02 VITALS
BODY MASS INDEX: 26.52 KG/M2 | HEIGHT: 68 IN | HEART RATE: 67 BPM | WEIGHT: 175 LBS | DIASTOLIC BLOOD PRESSURE: 90 MMHG | SYSTOLIC BLOOD PRESSURE: 138 MMHG

## 2017-02-02 DIAGNOSIS — N61.1 LEFT BREAST ABSCESS: Primary | ICD-10-CM

## 2017-02-02 DIAGNOSIS — N61.1 LEFT BREAST ABSCESS: ICD-10-CM

## 2017-02-02 LAB
BACTERIA SPEC CULT: ABNORMAL
GRAM STN SPEC: NORMAL
MICRO REPORT STATUS: ABNORMAL
MICRO REPORT STATUS: NORMAL
SPECIMEN SOURCE: ABNORMAL
SPECIMEN SOURCE: NORMAL

## 2017-02-02 PROCEDURE — 25000125 ZZHC RX 250: Performed by: SURGERY

## 2017-02-02 PROCEDURE — 76942 ECHO GUIDE FOR BIOPSY: CPT

## 2017-02-02 PROCEDURE — 87077 CULTURE AEROBIC IDENTIFY: CPT | Performed by: SURGERY

## 2017-02-02 PROCEDURE — 87205 SMEAR GRAM STAIN: CPT | Performed by: SURGERY

## 2017-02-02 PROCEDURE — 87070 CULTURE OTHR SPECIMN AEROBIC: CPT | Performed by: SURGERY

## 2017-02-02 PROCEDURE — 99204 OFFICE O/P NEW MOD 45 MIN: CPT | Performed by: SURGERY

## 2017-02-02 RX ORDER — DOXYCYCLINE 100 MG/1
100 CAPSULE ORAL 2 TIMES DAILY
Qty: 28 CAPSULE | Refills: 0 | Status: SHIPPED | OUTPATIENT
Start: 2017-02-02 | End: 2017-02-02

## 2017-02-02 RX ORDER — DOXYCYCLINE 100 MG/1
100 CAPSULE ORAL 2 TIMES DAILY
Qty: 28 CAPSULE | Refills: 0 | Status: SHIPPED | OUTPATIENT
Start: 2017-02-02 | End: 2017-03-20

## 2017-02-02 RX ORDER — HYDROCODONE BITARTRATE AND ACETAMINOPHEN 5; 325 MG/1; MG/1
1-2 TABLET ORAL EVERY 6 HOURS PRN
Qty: 20 TABLET | Refills: 0 | Status: SHIPPED | OUTPATIENT
Start: 2017-02-02 | End: 2017-03-20 | Stop reason: ALTCHOICE

## 2017-02-02 RX ADMIN — LIDOCAINE HYDROCHLORIDE 5 ML: 10 INJECTION, SOLUTION INFILTRATION; PERINEURAL at 14:38

## 2017-02-02 NOTE — MR AVS SNAPSHOT
After Visit Summary   2/2/2017    Shannen Enamorado    MRN: [id number]           Patient Information     Date Of Birth          1980        Visit Information        Provider Department      2/2/2017 1:00 PM Roxi Mcdonnell MD [facility] General [facility]      Today's Diagnoses     Left breast abscess    -  1        Follow-ups after your visit        Your next 10 appointments already scheduled     Feb 03, 2017  9:00 AM   [facility] Same Day Surgery with Roxi Mcdonnell MD   [facility])    [facility])   [phone]            Feb 03, 2017   Procedure with Roxi Mcdonnell MD   [facility] (--)    [address]  [facility] [address] [phone]              Future tests that were ordered for you today     Open Future Orders        Priority Expected Expires Ordered    US Breast Abscess Drain Routine 2/2/2017 2/2/2018 2/2/2017            Who to contact     If you have questions or need follow up information about today's clinic visit or your schedule please contact [facility] directly at [phone].  Normal or non-critical lab and imaging results will be communicated to you by BudgetSimple[employer], letter or phone within 4 business days after the clinic has received the results. If you do not hear from us within 7 days, please contact the clinic through [employer] or phone. If you have a critical or abnormal lab result, we will notify you by phone as soon as possible.  Submit refill requests through [employer] or call your pharmacy and they will forward the refill request to us. Please allow 3 business days for your refill to be completed.          Additional Information About Your Visit        Automattic Information     [employer] lets you send messages to your doctor, view  "your test results, renew your prescriptions, schedule appointments and more. To sign up, go to [email or web address] . Click on \"Log in\" on the left side of the screen, which will take you to the Welcome page. Then click on \"Sign up Now\" on the right side of the page.     You will be asked to enter the access code listed below, as well as some personal information. Please follow the directions to create your username and password.     Your access code is: [email or web address]  Expires: 2017  8:54 AM     Your access code will  in 90 days. If you need help or a new code, please call your [facility] clinic or [phone].        Care EveryWhere ID     This is your Care EveryWhere ID. This could be used by other organizations to access your [facility] medical records  [id number]        Your Vitals Were     Pulse Height BMI (Body Mass Index)             67 5' 8\" (1.727 m) 26.61 kg/m2          Blood Pressure from Last 3 Encounters:   17 138/90   17 148/100   17 160/110    Weight from Last 3 Encounters:   17 175 lb (79.379 kg)   17 178 lb (80.74 kg)   16 175 lb (79.379 kg)              Today, you had the following     No orders found for display         Today's Medication Changes          These changes are accurate as of: 17  3:34 PM.  If you have any questions, ask your nurse or doctor.               Start taking these medicines.        Dose/Directions    doxycycline Monohydrate 100 MG Caps   Used for:  Left breast abscess   Started by:  Roxi Mcdonnell MD        Dose:  100 mg   Take 1 capsule (100 mg) by mouth 2 times daily   Quantity:  28 capsule   Refills:  0       HYDROcodone-acetaminophen 5-325 MG per tablet   Commonly known as:  NORCO   Used for:  Left breast abscess   Started by:  Roxi Mcdonnell MD        Dose:  1-2 tablet   Take 1-2 tablets by mouth every 6 hours as needed for moderate to severe pain   Quantity:  20 tablet   Refills:  0            Where to get your " medicines      These medications were sent to [facility] - [place], [place] - [address]  [address], [place] [address]     Phone:  [phone]    - doxycycline Monohydrate 100 MG Caps      Some of these will need a paper prescription and others can be bought over the counter.  Ask your nurse if you have questions.     Bring a paper prescription for each of these medications    - HYDROcodone-acetaminophen 5-325 MG per tablet             Primary Care Provider Office Phone # Fax #    Swapna Rosas [phone]       [facility] [address]   [facility] [address]        Thank you!     Thank you for choosing [facility]  for your care. Our goal is always to provide you with excellent care. Hearing back from our patients is one way we can continue to improve our services. Please take a few minutes to complete the written survey that you may receive in the mail after your visit with us. Thank you!             Your Updated Medication List - Protect others around you: Learn how to safely use, store and throw away your medicines at www.disposemymeds.org.          This list is accurate as of: 2/2/17  3:34 PM.  Always use your most recent med list.                   Brand Name Dispense Instructions for use    ALEVE 220 MG tablet   Generic drug:  naproxen sodium      Take by mouth as needed       augmented betamethasone dipropionate 0.05 % cream    DIPROLENE-AF    50 g    Apply sparingly once or twice per day as needed to affected area until the skin is better, then stop.  Do not apply to face       cephALEXin 500 MG capsule    KEFLEX    40 capsule    Take 1 capsule (500 mg) by mouth 4 times daily for 10 days       cod liver oil Caps capsule          doxycycline Monohydrate 100 MG Caps     28 capsule    Take 1 capsule (100 mg) by mouth 2 times daily       HYDROcodone-acetaminophen 5-325 MG per tablet    NORCO    20 tablet     Take 1-2 tablets by mouth every 6 hours as needed for moderate to severe pain       sulfamethoxazole-trimethoprim 800-160 MG per tablet    BACTRIM DS    20 tablet    Take 1 tablet by mouth 2 times daily for 10 days       SUMAtriptan 50 MG tablet    IMITREX    6 tablet    Take 1 tablet (50 mg) by mouth at onset of headache for migraine May repeat another dose every 2-3 hours up to 3 extra doses.  Do not exceed 200 mg in 24 hours       triamcinolone 0.5 % cream    KENALOG     Apply topically 2 times daily

## 2017-02-02 NOTE — NURSING NOTE
Breast Patients    BREAST PATIENTS (ALL)    1-Do you have any of the following symptoms? Breast Pain and Lump(s) or Mass(es)  2-In which breast are you having the symptoms? left  3-Do you use hormones?  No  4-Have you had a Mammogram? Yes  Where: [facility]  Date: 1/20/2017  5-Have you ever had a breast cyst drained? No  6-Have you ever had a breast biopsy? Yes  Side: Unknown  Date: 1997  7-Have you ever had a Breast Cancer? No   8-Is there a history of Breast Cancer in your family? No  9-Have you ever had Ovarian Cancer? No  10-Is there a history of Ovarian Cancer in your family? No  11-Summarize your caffeine intake (i.e. coffee, tea, chocolate, soda etc.): 2 cups of coffee daily    BREAST PATIENTS (FEMALE)    12-What age did your periods begin? 9  13-Date your last menstrual period began? 12/2016  14-Number of full-term pregnancies: 1  15-Your age when your first child was born? 33  16-Did you nurse your children? Yes  17-Are you pregnant now? No  18-Have you begun menopause? No  19-Have you had either ovary removed?No  20-Do you have breast implants? No

## 2017-02-02 NOTE — H&P (VIEW-ONLY)
"  History     Chief Complaint:  Breast pain     HPI   Shannen Enamorado is a 36 year old female who presents with breast pain. The patient states that she has a history of mastitis, with an episode occuring when she was breastfeeding in the past, and another episode 10 months after she ceased breastfeeding. Beginning approximately three weeks ago, 1/8, she describes pain in the area that is similar to pain that she experienced during a previous episode of mastitis. On 1/16, the patient saw her plastic surgeon for a Botox injection. Additionally, during this time, she was prescribed a five day course of Keflex for her suspected mastitis. The patient describes no improvement of her symptoms, and notes the gradual enlargement of a mass into the size of a \"hockey puck\" on her left breast as well as reddening of the region. Therefore, on Friday, 1/20, she went to the [facility] clinic to be evaluated. The patient was referred to the associated breast center, where blood work was performed, which was negative. A mammogram and US were done as well, and after reviewing the results, the patient was told to wait to see how her symptoms progress. On Monday and Tuesday, 1/23 and 1/24, she reports that she felt that the pain was \"easing up\", however, on Thursday night, 1/26, after using a warm compress, her pain worsened. The following morning, the patient called the breast center and told them of her progression, and is scheduled for an appointment tomorrow, 1/30, at 0830. During this past week, the patient endorses the use of cold compresses as well as ibuprofen, which the patient states is effective in alleviating her pain. Her most recent dosage of ibuprofen was one tablet, and which she took an hour prior to presentation. Additionally, she notes that as a result of her pain, she has not been sleeping well and has been diaphoretic for the past two nights, and has had an intermittent fever as well during this time, " however, on presentation, her temperature is 98.8F. She describes generalized muscle aches in her back, as well as wrapping chest pain into her mid sternal area, and ankle and feet pain.      Allergies:  NKDA     Medications:    cephALEXin (KEFLEX) 500 MG capsule  triamcinolone (KENALOG) 0.5 % cream  amoxicillin-clavulanate (AUGMENTIN) 875-125 MG per tablet  augmented betamethasone dipropionate (DIPROLENE-AF) 0.05 % cream  HYDROcodone-acetaminophen (VICODIN) 5-500 MG per tablet  SUMAtriptan (IMITREX) 50 MG tablet  naproxen sodium (ALEVE) 220 MG tablet     Past Medical History:    Abnormal pap smear  Herpes simplex  migraine    Past Surgical History:    The patient does not have any pertinent past surgical history.    Family History:    Mother is positive for hypertension and migraines    Social History:  Former smoker  Positive for alcohol use.   Accompanied by female acquaintance.   Marital Status:  Single [1]     Review of Systems   Constitutional: Negative for fever.   Musculoskeletal: Positive for back pain.        Positive for ankle and feet pain  Positive for mid-sternal pain   Skin: Positive for color change (redness of left breast).        Positive for mass on the left breast     All other systems reviewed and are negative.      Physical Exam   First Vitals:  Patient Vitals for the past 24 hrs:   BP Temp Temp src Pulse Heart Rate Resp SpO2   01/29/17 1719 - - - - - - 99 %   01/29/17 1629 - - - - - - 98 %   01/29/17 1602 - - - - - - 100 %   01/29/17 1601 (!) 148/100 mmHg - - - - - 96 %   01/29/17 1542 - - - 97 97 - 97 %   01/29/17 1504 (!) 155/102 mmHg 98.8  F (37.1  C) Oral 125 125 18 100 %        Physical Exam  General: Patient is alert and normal appearing.  HEENT: Head atraumatic    Eyes: pupils equal and reactive. Conjunctiva clear   Nares: patent   Oropharynx: no lesions, uvula midline, no palatal draping, normal voice, no trismus  Neck: Supple without lymphadenopathy, no meningismus  Chest: tachycardic  but regular.  Left breast with erythema, induration, inverted nipple, no discharge or drainage, tenderness and warmth   Lungs: Equal clear to auscultation with no wheeze or rales  Abdomen: Soft, non tender, nondistended, normal bowel sounds  Back: No costovertebral angle tenderness, no midline C, T or L spine tenderness  Neuro: Grossly nonfocal, normal speech, strength equal bilaterally, CN 2-12 intact  Extremities: No deformities, equal radial and DP pulses. No clubbing, cyanosis.  No edema  Skin: Warm and dry with no rash.       Emergency Department Course   Imaging:  Radiographic findings were communicated with the patient who voiced understanding of the findings.    US left breast:  Targeted ultrasound evaluation of the left upper inner breast again demonstrates overlying skin thickening with subcutaneous edema and heterogeneous background echotexture. No definite fluid  collection is appreciated. As per radiology.     Laboratory:  CBC: WBC: 15.2 (H), HGB: 12.7 (H), PLT: 290  BMP: Glucose 126 (H), potassium 3.3 (L), o/w WNL (Creatinine: 0.79)    Lactic acid: 1.1  1543 Blood culture: pending   1618 Blood culture: pending    Interventions:  1532 NS 1000 mL IV  1701 Ancef 1 g IV    Emergency Department Course:  Nursing notes and vitals reviewed.  I performed an exam of the patient as documented above.     Blood drawn. This was sent to the lab for further testing, results above.    1540 I consulted with Dr. Meade, radiology, in regards to the patient.     The patient was sent for a left breast US while in the emergency department, findings above.     1656 I consulted with Dr. Azevedo, general surgery, in regards to the patient.    1659 I reevaluated the patient and provided an update in regards to her ED course.      Findings and plan explained to the Patient. Patient discharged home with instructions regarding supportive care, medications, and reasons to return. The importance of close follow-up was reviewed. The  patient was prescribed Keflex, 500 mg 4 times daily for 10 days, and Bactrim, 1 tablet 2 times daily for 10 days.     I personally reviewed the laboratory results with the Patient and answered all related questions prior to discharge.     Impression & Plan      Medical Decision Making:  Shannen Enamorado is a 36 year old female who presented with red, warm, swollen, painful left breast. She was initially treated for mastitis, and had a five day course of Keflex that seemed to improve symptoms. However, her symptoms have gotten worse since then. She was seen in the breast center, and had a mammogram and US done, which showed inflammatory changes of the left breast. She is scheduled for follow up tomorrow for a biopsy, but she had increasing pain and warmth as well as generalized myalgias today that prompted her to present to the ED. Here, her WBC is elevated at 15. She was initially tachycardic at 125, but this improved with fluid hydration. Her lactic acid is 1.1, and within the acceptable range, making her symptoms inconsistent with sepsis at this time. Glucose was mildly elevated at 126. Repeat US does not show a focal abscess of the left breast. There is overlying skin thickening with subcutaneous edema and heterogeneous background echotexture. No definite fluid collection is appreciated. I discussed with Dr. Azevedo, on call for surgery. He felt that it was appropriate for her to keep her follow up appointment in the breast cancer or follow up with one of the breast surgeons specifically. I have provided her a name of a breast surgeon. She has been off antibiotics for about a week and a half, making this unlikely to be a failure of oral antibiotics. Given that she is not septic at this time, I think it is reasonable to attempt oral antibiotics. I am giving her a dose of   Ancef here, and I will discharge her with Keflex and Bactrim. She has an appointment in about 15 hours with the breast center.  I offered  needle aspiration of the area that looks most fluctuant but without focal abscess on ultrasound, follow up appointment with breast surgeon in the morning the patient and I feel it is prudent to allow them to decide how they wish to proceed. At that time they can determine if they want to attempt to drain this or just biopsy it.   If she develops any worsening symptoms, I have reccommended that she return the ED for re-evaluation. She expressed agreement and understanding of this plan, and all questions and concerns were addressed at the time of discharge.     Diagnosis:    ICD-10-CM    1. Cellulitis of left breast N61.0    2. Inflammatory disease of left breast N61.0        Discharge Medications:  Discharge Medication List as of 1/29/2017  5:41 PM      START taking these medications    Details   !! cephALEXin (KEFLEX) 500 MG capsule Take 1 capsule (500 mg) by mouth 4 times daily for 10 days, Disp-40 capsule, R-0, Local Print      sulfamethoxazole-trimethoprim (BACTRIM DS) 800-160 MG per tablet Take 1 tablet by mouth 2 times daily for 10 days, Disp-20 tablet, R-0, Local Print       !! - Potential duplicate medications found. Please discuss with provider.        I, Samuel Salinas, am serving as a scribe on 1/29/2017 at 3:06 PM to personally document services performed by Marisol Estevez MD based on my observations and the provider's statements to me.     Samuel Salinas  1/29/2017    EMERGENCY DEPARTMENT        Marisol Estevez MD  01/29/17 2525

## 2017-02-02 NOTE — PROGRESS NOTES
Breast Surgical Consultation Note    HPI:  Shannen Enamorado is a 36 year old female who noted  left breast pain which started on January 9th, 2016. She had felt flu like symptoms and heaviness in her left breast on January 8.  She had a massage on the 9th and shortly thereafter noticed inflammation with redness on her left breast.  She saw her plastic surgeon for Botox injections on January 10 and asked them to look at the left breast and they were concerned about mastitis/cellulitis and started her on Keflex at that time.  Through the weekend of January 14 to the 16th and the mass in the left breast progressed about the size of a hockey pocket was very firm and tender.  On January 20 she had in his doctor's appointments and was referred to the breast center.  She was seen and had an ultrasound which did not at that point show any fluid.  She was seen again on this past Monday, January 30 in the breast clinic and mammograms and ultrasound guided drainage was performed and a significant amount of purulent fluid was evacuated.  On the night of the 31st, Tuesday night she again had very severe pain and there is a area that appeared to be like a bubble and she applied hot compresses and then drained a significant amount of purulent and bloody fluid.  She reports that she has another area superior to that one today that feels very fluctuant and under pressure and is extremely painful.  Both of these areas are located on her left breast on the inferior medial aspect of the breast. She had been seen in the emergency department on January 28 because of pain at that point and at that time was started on Bactrim as well as continued on Keflex.  Since the drainage the surrounding redness has improved but the underlying inflammation is still there.She denies trauma, or new physical activity/exercise routine.  Denies increased pain with activity.  The pain is not cyclical, though she does have some cyclical breast pain  surrounding her ovulation.  Her last menstrual period was in December 20.  She is began having periods when she is 9 years old.  She has one child who she had when she was 33.  She breast-fed for one year.  She has no family history of breast cancer or any other cancers including ovarian, testicular, prostate, colon, and Melanoma.  With this process she has noted that the left nipple is inverted which is new.    Skin rashes, dimpling or nipple changes:  left  Nipple discharge:  none  Performs self breast exams:  Yes  Previous breast biopsies:  No  Previous cyst aspiration:  No  Previous other breast surgery:  No    Hormonal history:  Pre menopausal, no OCP/HRT, no fertility treatment.     Family history of breast cancer: No  Family history of ovarian cancer: No    Imaging:    Recent Results (from the past 744 hour(s))   MA Diagnostic Digital Bilateral    Narrative    Examination: Bilateral digital diagnostic mammography and digital  breast tomosynthesis with computer aided detection, and focussed  ultrasound of the left breast, 1/20/2017.    Comparison: 20/2/2011    History: Palpable lump with tenderness and erythema in the left breast  as well as recent nipple retraction on that side. Patient states that  she had mastitis while pregnant several years ago. Current symptoms  began in the span of an hour approximately a week and a half ago and  were accompanied with fever and malaise. Systemic symptoms resolved  following five-day antibiotic course, but erythema and fullness in the  medial left breast persisted.    BREAST DENSITY: Heterogeneously dense    Findings: Left breast is diffusely mildly denser than the right.  Prominent left axillary lymph nodes are noted.    Focused ultrasound the left breast was performed by radiologist and  technologist. At the patient's palpable area of concern spanning 9:00  to 12:00 position mid left breast is ill-defined hypoechogenicity  within the otherwise normal breast tissue. No  fluid collections are  seen. Prominent left axillary lymph nodes are also seen.      Impression    IMPRESSION: BI-RADS CATEGORY: 3 - Probably Benign Finding-Short  Interval Follow-Up Suggested.    RECOMMENDED FOLLOW-UP: Short Interval Follow-up.    Short-term follow-up with repeat left breast ultrasound. Patient  should return for this in 2 weeks if her symptoms continue to improve.  If symptoms worsen, she should return at her earliest convenience, and  if they persist at their current level she should return in  approximately 4-5 days. If symptoms do not resolve, consider core  needle biopsy for histopathology as well as culture.    The patient was given the results of the examination.    YASMIN KWON Breast Left Complete 4 Quadrants    Narrative    Examination: Bilateral digital diagnostic mammography and digital  breast tomosynthesis with computer aided detection, and focussed  ultrasound of the left breast, 1/20/2017.    Comparison: 20/2/2011    History: Palpable lump with tenderness and erythema in the left breast  as well as recent nipple retraction on that side. Patient states that  she had mastitis while pregnant several years ago. Current symptoms  began in the span of an hour approximately a week and a half ago and  were accompanied with fever and malaise. Systemic symptoms resolved  following five-day antibiotic course, but erythema and fullness in the  medial left breast persisted.    BREAST DENSITY: Heterogeneously dense    Findings: Left breast is diffusely mildly denser than the right.  Prominent left axillary lymph nodes are noted.    Focused ultrasound the left breast was performed by radiologist and  technologist. At the patient's palpable area of concern spanning 9:00  to 12:00 position mid left breast is ill-defined hypoechogenicity  within the otherwise normal breast tissue. No fluid collections are  seen. Prominent left axillary lymph nodes are also seen.      Impression    IMPRESSION:  BI-RADS CATEGORY: 3 - Probably Benign Finding-Short  Interval Follow-Up Suggested.    RECOMMENDED FOLLOW-UP: Short Interval Follow-up.    Short-term follow-up with repeat left breast ultrasound. Patient  should return for this in 2 weeks if her symptoms continue to improve.  If symptoms worsen, she should return at her earliest convenience, and  if they persist at their current level she should return in  approximately 4-5 days. If symptoms do not resolve, consider core  needle biopsy for histopathology as well as culture.    The patient was given the results of the examination.    YASMIN VELASQUEZ   US Breast Left    Narrative    ULTRASOUND LEFT BREAST - 1/29/2017 4:01 PM    HISTORY: Possible left breast abscess.    COMPARISON:  January 20, 2017.    FINDINGS: Targeted ultrasound evaluation of the left upper inner  breast again demonstrates overlying skin thickening with subcutaneous  edema and heterogeneous background echotexture. No definite fluid  collection is appreciated.       Impression    IMPRESSION: BI-RADS CATEGORY: 3 - Probably Benign Finding-Short  Interval Follow-Up Suggested.    RECOMMENDED FOLLOW-UP: Short Interval Follow-up 1-2 weeks.  No definite abscess appreciated. Findings could reflect edema or  infection, although inflammatory carcinoma cannot be excluded.  Recommend clinical correlation and follow-up ultrasound imaging after  appropriate treatment. If symptoms persist, consider punch biopsy for  diagnosis.    TENISHA SEGAL MD   US Breast Left    Narrative    Left breast ultrasound    Comparisons: 1/20/2017 and 1/29/2017    History: Left breast mastitis with worsening lump, erythema and fever.    FINDINGS:     Focused ultrasound of the left breast performed by  radiologist and technologist.    Skin thickening heterogeneity within the left breast at the patient's  area of concern, likely representing abscess.    Patient opted for abscess aspiration, see accompanying report for  further details of  that procedure.      Impression    IMPRESSION: BI-RADS CATEGORY: 3 - Probably Benign Finding-Short  Interval Follow-Up Suggested    RECOMMENDED FOLLOW-UP: Short Interval Follow-up.    Follow-up with repeat left breast ultrasound in 2 weeks. However, if  patient's symptoms worsen, she should return sooner for possible  repeat aspiration.    The patient was given the results of the examination.    YASMIN VELASQUEZ   US Breast Abscess Drain    Narrative    US BREAST ABSCESS DRAIN, 1/30/2017 9:36 AM    Comparison: 1/20/2017, 1/29/2017 1/30/2017.    History: Mastitis with abscess in the left breast.    Procedure: Patient gave her verbal and written informed consent. Using  normal aseptic technique, 1% lidocaine solution for local anesthesia  and a 16-gauge needle, left breast abscess was aspirated. Images were  archived. Approximately 20 mL of purulent blood-tinged fluid was  aspirated. Patient tolerated the procedure without apparent  complication.    The aspirated sample was sent for Gram stain and culture.      Impression    Impression: BI-RADS 3, probably benign.    Recommendation: Patient should return for repeat left breast  ultrasound in 2 weeks unless her symptoms worsen, in which case she  should return sooner for possible repeat aspiration.    YASMIN VELASQUEZ          Past Medical History:   has a past medical history of Abnormal Pap smear (6 years ago-f/u was normal); Herpes simplex without mention of complication (no lesions currently); and Migraine (2007).    Past Surgical History:  Past Surgical History   Procedure Laterality Date     No history of surgery          Social History:  Social History     Social History     Marital Status: Single     Spouse Name: N/A     Number of Children: N/A     Years of Education: N/A     Occupational History     Not on file.     Social History Main Topics     Smoking status: Former Smoker     Types: Cigarettes     Smokeless tobacco: Never Used     Alcohol Use: Yes     Drug Use: No  "    Sexual Activity:     Partners: Male     Other Topics Concern     Not on file     Social History Narrative        ROS:  The 10 point review of systems is negative other than noted in the HPI and above.    PE:  Vitals: /90 mmHg  Pulse 67  Ht 5' 8\" (1.727 m)  Wt 175 lb (79.379 kg)  BMI 26.61 kg/m2  General appearance: well-nourished, sitting comfortably, no apparent distress  Neck: Supple, without masses or lymphadenopathy   Respirations:  Unlabored  Extremities: Without edema  Neurologic: alert, speech is clear, moves all extremities with good strength  Psychiatric: Mood and affect are appropriate  Skin: Without lesions or rashes  Breast:     Left breast: Area of erythema medial and inferior to the area lateral spanning 8 cm with underlying indurated breast tissue palpable.  Fluctuant area noted just Medial to the nipple which is exquisitely tender.  Inverted nipple.  No nipple drainage.  erythema does cross to the lateral aspect of the breast beyond the nipple areola complex is very mild in that location.  Right breast: Symmetrical with no skin or nipple changes.  Contour is normal.  No breast tenderness.  Parenchyma is moderately dense.  No masses.     Lymph:      No supraclavicular/infraclavicular adenopathy.    Axillary adenopathy: Present on the left       Assessment:    Shannen is a 36 year old female with a left breast pain involving the  lower inner quadrant.  This is clinically consistent with breast abscess.  She has been treated with two courses of antibiotics with minimal improvement and at progression of further abscesses.  She underwent an ultrasound-guided drainage of the fluctuant superior area today in clinic with radiology.  She had significant pain with the procedure but they were able to aspirate 10 cc.  We discussed options including surgical incision and drainage with biopsy in the operating room under conscious sedation to rule out granulomatous mastitis vs inflammatory breast " cancer    PLAN:  I&D left breast abscess with biopsy in OR tomorrow vs Monday.   Doxycyline ordered given cultures  Prn Vicodin pain control       Time spent with the patient with greater that 50% of the time in discussion was 50 minutes.     Roxi Mcdonnell MD    Please route or send letter to:  Primary Care Provider (PCP) and Referring Provider      HPI      ROS      Physical Exam

## 2017-02-02 NOTE — Clinical Note
2017    Breast Surgical Consultation Note    RE:  Shannen Enamorado-:  [birth year]    HPI:  Shannen Enamorado is a 36 year old female who noted  left breast pain which started on 2016. She had felt flu like symptoms and heaviness in her left breast on .  She had a massage on the  and shortly thereafter noticed inflammation with redness on her left breast.  She saw her plastic surgeon for Botox injections on January 10 and asked them to look at the left breast and they were concerned about mastitis/cellulitis and started her on Keflex at that time.  Through the weekend of  to the  and the mass in the left breast progressed about the size of a hockey pocket was very firm and tender.  On  she had in his doctor's appointments and was referred to the breast center.  She was seen and had an ultrasound which did not at that point show any fluid.  She was seen again on this past  in the breast clinic and mammograms and ultrasound guided drainage was performed and a significant amount of purulent fluid was evacuated.  On the night of the ,  Tuesday night she again had very severe pain and there is a area that appeared to be like a bubble and she applied hot compresses and then drained a significant amount of purulent and bloody fluid.  She reports that she has another area superior to that one today that feels very fluctuant and under pressure and is extremely painful.  Both of these areas are located on her left breast on the inferior medial aspect of the breast. She had been seen in the emergency department on  because of pain at that point and at that time was started on Bactrim as well as continued on Keflex.  Since the drainage the surrounding redness has improved but the underlying inflammation is still there.She denies trauma, or new physical activity/exercise routine.  Denies increased pain with activity.  The pain is  "not cyclical, though she does have some cyclical breast pain surrounding her ovulation.  Her last menstrual period was in December 20.  She is began having periods when she is 9 years old.  She has one child who she had when she was 33.  She breast-fed for one year.  She has no family history of breast cancer or any other cancers including ovarian, testicular, prostate, colon, and Melanoma.  With this process she has noted that the left nipple is inverted which is new.    Skin rashes, dimpling or nipple changes:  left  Nipple discharge:  none  Performs self breast exams:  Yes  Previous breast biopsies:  No  Previous cyst aspiration:  No  Previous other breast surgery:  No    Hormonal history:  Pre menopausal, no OCP/HRT, no fertility treatment.     Family history of breast cancer: No  Family history of ovarian cancer: No      Past Medical History:  Has a past medical history of abnormal pap smear (6 years ago-f/u was normal); Herpes simplex without mention of complication (no lesions currently); and Migraine (2007).    ROS:  The 10 point review of systems is negative other than noted in the HPI and above.    PE:  Vitals: /90 mmHg  Pulse 67  Ht 5' 8\" (1.727 m)  Wt 175 lb (79.379 kg)  BMI 26.61 kg/m2  General appearance: well-nourished, sitting comfortably, no apparent distress  Neck: Supple, without masses or lymphadenopathy   Respirations:  Unlabored  Extremities: Without edema  Neurologic: alert, speech is clear, moves all extremities with good strength  Psychiatric: Mood and affect are appropriate  Skin: Without lesions or rashes  Breast:     Left breast: Area of erythema medial and inferior to the area lateral spanning 8 cm with underlying indurated breast tissue palpable.  Fluctuant area noted just Medial to the nipple which is exquisitely tender.  Inverted nipple.  No nipple drainage.  erythema does cross to the lateral aspect of the breast beyond the nipple areola complex is very mild in that " location.  Right breast: Symmetrical with no skin or nipple changes.  Contour is normal.  No breast tenderness.  Parenchyma is moderately dense.  No masses.     Lymph:      No supraclavicular/infraclavicular adenopathy.    Axillary adenopathy: Present on the left     Assessment:    Shannen is a 36 year old female with a left breast pain involving the  lower inner quadrant.  This is clinically consistent with breast abscess.  She has been treated with two courses of antibiotics with minimal improvement and at progression of further abscesses.  She underwent an ultrasound-guided drainage of the fluctuant superior area today in clinic with radiology.  She had significant pain with the procedure but they were able to aspirate 10 cc.  We discussed options including surgical incision and drainage with biopsy in the operating room under conscious sedation to rule out granulomatous mastitis vs inflammatory breast cancer    PLAN:  I&D left breast abscess with biopsy in OR tomorrow vs Monday.   Doxycyline ordered given cultures  Prn Vicodin pain control         Roxi Mcdonnell MD

## 2017-02-02 NOTE — INTERVAL H&P NOTE
This note is for the purpose of making the H&P performed in clinic within the last 30 days available in the hospital encounter.

## 2017-02-03 ENCOUNTER — ANESTHESIA EVENT (OUTPATIENT)
Dept: SURGERY | Facility: CLINIC | Age: 37
End: 2017-02-03
Payer: COMMERCIAL

## 2017-02-03 ENCOUNTER — ANESTHESIA (OUTPATIENT)
Dept: SURGERY | Facility: CLINIC | Age: 37
End: 2017-02-03
Payer: COMMERCIAL

## 2017-02-03 ENCOUNTER — HOSPITAL ENCOUNTER (OUTPATIENT)
Facility: CLINIC | Age: 37
Discharge: HOME | End: 2017-02-03
Attending: SURGERY | Admitting: SURGERY
Payer: COMMERCIAL

## 2017-02-03 ENCOUNTER — APPOINTMENT (OUTPATIENT)
Dept: SURGERY | Facility: PHYSICIAN GROUP | Age: 37
End: 2017-02-03
Payer: COMMERCIAL

## 2017-02-03 VITALS
BODY MASS INDEX: 26.37 KG/M2 | DIASTOLIC BLOOD PRESSURE: 71 MMHG | TEMPERATURE: 97.8 F | RESPIRATION RATE: 16 BRPM | SYSTOLIC BLOOD PRESSURE: 112 MMHG | OXYGEN SATURATION: 99 % | WEIGHT: 174 LBS | HEIGHT: 68 IN

## 2017-02-03 DIAGNOSIS — N61.1 LEFT BREAST ABSCESS: Primary | ICD-10-CM

## 2017-02-03 LAB
GRAM STN SPEC: NORMAL
GRAM STN SPEC: NORMAL
HCG SERPL QL: NEGATIVE
Lab: NORMAL
MICRO REPORT STATUS: NORMAL
MICRO REPORT STATUS: NORMAL
SPECIMEN SOURCE: NORMAL
SPECIMEN SOURCE: NORMAL

## 2017-02-03 PROCEDURE — 87075 CULTR BACTERIA EXCEPT BLOOD: CPT | Mod: 91 | Performed by: SURGERY

## 2017-02-03 PROCEDURE — 71000013 ZZH RECOVERY PHASE 1 LEVEL 1 EA ADDTL HR: Performed by: SURGERY

## 2017-02-03 PROCEDURE — 36000050 ZZH SURGERY LEVEL 2 1ST 30 MIN: Performed by: SURGERY

## 2017-02-03 PROCEDURE — 88305 TISSUE EXAM BY PATHOLOGIST: CPT | Mod: 26 | Performed by: SURGERY

## 2017-02-03 PROCEDURE — 84703 CHORIONIC GONADOTROPIN ASSAY: CPT | Performed by: SURGERY

## 2017-02-03 PROCEDURE — 25000125 ZZHC RX 250: Performed by: NURSE ANESTHETIST, CERTIFIED REGISTERED

## 2017-02-03 PROCEDURE — 88342 IMHCHEM/IMCYTCHM 1ST ANTB: CPT | Mod: 26 | Performed by: SURGERY

## 2017-02-03 PROCEDURE — 37000008 ZZH ANESTHESIA TECHNICAL FEE, 1ST 30 MIN: Performed by: SURGERY

## 2017-02-03 PROCEDURE — 87070 CULTURE OTHR SPECIMN AEROBIC: CPT | Mod: 91 | Performed by: SURGERY

## 2017-02-03 PROCEDURE — 25000125 ZZHC RX 250: Performed by: ANESTHESIOLOGY

## 2017-02-03 PROCEDURE — 87205 SMEAR GRAM STAIN: CPT | Performed by: SURGERY

## 2017-02-03 PROCEDURE — 87070 CULTURE OTHR SPECIMN AEROBIC: CPT | Performed by: SURGERY

## 2017-02-03 PROCEDURE — 71000012 ZZH RECOVERY PHASE 1 LEVEL 1 FIRST HR: Performed by: SURGERY

## 2017-02-03 PROCEDURE — 19101 BIOPSY OF BREAST OPEN: CPT | Mod: 59 | Performed by: SURGERY

## 2017-02-03 PROCEDURE — 25800025 ZZH RX 258: Performed by: NURSE ANESTHETIST, CERTIFIED REGISTERED

## 2017-02-03 PROCEDURE — 88341 IMHCHEM/IMCYTCHM EA ADD ANTB: CPT | Mod: 26,59 | Performed by: SURGERY

## 2017-02-03 PROCEDURE — 88341 IMHCHEM/IMCYTCHM EA ADD ANTB: CPT | Performed by: SURGERY

## 2017-02-03 PROCEDURE — 25000128 H RX IP 250 OP 636: Performed by: NURSE ANESTHETIST, CERTIFIED REGISTERED

## 2017-02-03 PROCEDURE — 25000132 ZZH RX MED GY IP 250 OP 250 PS 637: Performed by: ANESTHESIOLOGY

## 2017-02-03 PROCEDURE — 37000009 ZZH ANESTHESIA TECHNICAL FEE, EACH ADDTL 15 MIN: Performed by: SURGERY

## 2017-02-03 PROCEDURE — 87076 CULTURE ANAEROBE IDENT EACH: CPT | Performed by: SURGERY

## 2017-02-03 PROCEDURE — 25000128 H RX IP 250 OP 636: Performed by: ANESTHESIOLOGY

## 2017-02-03 PROCEDURE — 88342 IMHCHEM/IMCYTCHM 1ST ANTB: CPT | Performed by: SURGERY

## 2017-02-03 PROCEDURE — 71000027 ZZH RECOVERY PHASE 2 EACH 15 MINS: Performed by: SURGERY

## 2017-02-03 PROCEDURE — 36000052 ZZH SURGERY LEVEL 2 EA 15 ADDTL MIN: Performed by: SURGERY

## 2017-02-03 PROCEDURE — 88305 TISSUE EXAM BY PATHOLOGIST: CPT | Performed by: SURGERY

## 2017-02-03 PROCEDURE — 19020 MASTOTOMY EXPL DRG ABSC DP: CPT | Performed by: SURGERY

## 2017-02-03 PROCEDURE — 27210794 ZZH OR GENERAL SUPPLY STERILE: Performed by: SURGERY

## 2017-02-03 PROCEDURE — 25000125 ZZHC RX 250: Performed by: SURGERY

## 2017-02-03 PROCEDURE — 36415 COLL VENOUS BLD VENIPUNCTURE: CPT | Performed by: SURGERY

## 2017-02-03 PROCEDURE — 40000170 ZZH STATISTIC PRE-PROCEDURE ASSESSMENT II: Performed by: SURGERY

## 2017-02-03 RX ORDER — SODIUM CHLORIDE, SODIUM LACTATE, POTASSIUM CHLORIDE, CALCIUM CHLORIDE 600; 310; 30; 20 MG/100ML; MG/100ML; MG/100ML; MG/100ML
INJECTION, SOLUTION INTRAVENOUS CONTINUOUS
Status: DISCONTINUED | OUTPATIENT
Start: 2017-02-03 | End: 2017-02-03 | Stop reason: HOSPADM

## 2017-02-03 RX ORDER — LIDOCAINE HYDROCHLORIDE 20 MG/ML
INJECTION, SOLUTION INFILTRATION; PERINEURAL PRN
Status: DISCONTINUED | OUTPATIENT
Start: 2017-02-03 | End: 2017-02-03

## 2017-02-03 RX ORDER — GAUZE BANDAGE 4" X 4"
BANDAGE TOPICAL
Qty: 24 EACH | Refills: 3 | Status: SHIPPED | OUTPATIENT
Start: 2017-02-03 | End: 2017-11-22

## 2017-02-03 RX ORDER — PROPOFOL 10 MG/ML
INJECTION, EMULSION INTRAVENOUS CONTINUOUS PRN
Status: DISCONTINUED | OUTPATIENT
Start: 2017-02-03 | End: 2017-02-03

## 2017-02-03 RX ORDER — HYDROMORPHONE HYDROCHLORIDE 1 MG/ML
.3-.5 INJECTION, SOLUTION INTRAMUSCULAR; INTRAVENOUS; SUBCUTANEOUS EVERY 5 MIN PRN
Status: DISCONTINUED | OUTPATIENT
Start: 2017-02-03 | End: 2017-02-03 | Stop reason: HOSPADM

## 2017-02-03 RX ORDER — ADHESIVE TAPE 4"X360"
TAPE, NON-MEDICATED TOPICAL
Qty: 1 EACH | Refills: 2 | Status: SHIPPED | OUTPATIENT
Start: 2017-02-03 | End: 2017-11-22

## 2017-02-03 RX ORDER — ASPIRIN 81 MG
100 TABLET, DELAYED RELEASE (ENTERIC COATED) ORAL 2 TIMES DAILY
Qty: 60 TABLET | Refills: 1 | Status: SHIPPED | OUTPATIENT
Start: 2017-02-03 | End: 2017-03-20

## 2017-02-03 RX ORDER — FENTANYL CITRATE 50 UG/ML
INJECTION, SOLUTION INTRAMUSCULAR; INTRAVENOUS PRN
Status: DISCONTINUED | OUTPATIENT
Start: 2017-02-03 | End: 2017-02-03

## 2017-02-03 RX ORDER — HYDROCODONE BITARTRATE AND ACETAMINOPHEN 5; 325 MG/1; MG/1
1 TABLET ORAL
Status: COMPLETED | OUTPATIENT
Start: 2017-02-03 | End: 2017-02-03

## 2017-02-03 RX ORDER — ONDANSETRON 4 MG/1
4 TABLET, ORALLY DISINTEGRATING ORAL EVERY 30 MIN PRN
Status: DISCONTINUED | OUTPATIENT
Start: 2017-02-03 | End: 2017-02-03 | Stop reason: HOSPADM

## 2017-02-03 RX ORDER — CEFAZOLIN SODIUM 1 G/3ML
1 INJECTION, POWDER, FOR SOLUTION INTRAMUSCULAR; INTRAVENOUS SEE ADMIN INSTRUCTIONS
Status: DISCONTINUED | OUTPATIENT
Start: 2017-02-03 | End: 2017-02-03

## 2017-02-03 RX ORDER — ONDANSETRON 2 MG/ML
4 INJECTION INTRAMUSCULAR; INTRAVENOUS EVERY 30 MIN PRN
Status: DISCONTINUED | OUTPATIENT
Start: 2017-02-03 | End: 2017-02-03 | Stop reason: HOSPADM

## 2017-02-03 RX ORDER — SODIUM CHLORIDE, SODIUM LACTATE, POTASSIUM CHLORIDE, CALCIUM CHLORIDE 600; 310; 30; 20 MG/100ML; MG/100ML; MG/100ML; MG/100ML
INJECTION, SOLUTION INTRAVENOUS CONTINUOUS PRN
Status: DISCONTINUED | OUTPATIENT
Start: 2017-02-03 | End: 2017-02-03

## 2017-02-03 RX ORDER — FENTANYL CITRATE 50 UG/ML
25-50 INJECTION, SOLUTION INTRAMUSCULAR; INTRAVENOUS
Status: DISCONTINUED | OUTPATIENT
Start: 2017-02-03 | End: 2017-02-03 | Stop reason: HOSPADM

## 2017-02-03 RX ORDER — PROPOFOL 10 MG/ML
INJECTION, EMULSION INTRAVENOUS PRN
Status: DISCONTINUED | OUTPATIENT
Start: 2017-02-03 | End: 2017-02-03

## 2017-02-03 RX ORDER — ONDANSETRON 2 MG/ML
INJECTION INTRAMUSCULAR; INTRAVENOUS PRN
Status: DISCONTINUED | OUTPATIENT
Start: 2017-02-03 | End: 2017-02-03

## 2017-02-03 RX ORDER — GAUZE BANDAGE 1/4"X180"
BANDAGE TOPICAL
Qty: 1 EACH | Refills: 2 | Status: SHIPPED | OUTPATIENT
Start: 2017-02-03 | End: 2017-11-22

## 2017-02-03 RX ORDER — CEFAZOLIN SODIUM 2 G/100ML
2 INJECTION, SOLUTION INTRAVENOUS
Status: DISCONTINUED | OUTPATIENT
Start: 2017-02-03 | End: 2017-02-03

## 2017-02-03 RX ADMIN — FENTANYL CITRATE 50 MCG: 50 INJECTION, SOLUTION INTRAMUSCULAR; INTRAVENOUS at 09:48

## 2017-02-03 RX ADMIN — PHENYLEPHRINE HYDROCHLORIDE 100 MCG: 10 INJECTION, SOLUTION INTRAMUSCULAR; INTRAVENOUS; SUBCUTANEOUS at 10:03

## 2017-02-03 RX ADMIN — HYDROCODONE BITARTRATE AND ACETAMINOPHEN 1 TABLET: 5; 325 TABLET ORAL at 11:17

## 2017-02-03 RX ADMIN — FENTANYL CITRATE 50 MCG: 50 INJECTION, SOLUTION INTRAMUSCULAR; INTRAVENOUS at 10:44

## 2017-02-03 RX ADMIN — FENTANYL CITRATE 50 MCG: 50 INJECTION, SOLUTION INTRAMUSCULAR; INTRAVENOUS at 10:34

## 2017-02-03 RX ADMIN — PROPOFOL 10 MCG/KG/MIN: 10 INJECTION, EMULSION INTRAVENOUS at 09:40

## 2017-02-03 RX ADMIN — FENTANYL CITRATE 50 MCG: 50 INJECTION, SOLUTION INTRAMUSCULAR; INTRAVENOUS at 09:43

## 2017-02-03 RX ADMIN — PHENYLEPHRINE HYDROCHLORIDE 100 MCG: 10 INJECTION, SOLUTION INTRAMUSCULAR; INTRAVENOUS; SUBCUTANEOUS at 10:09

## 2017-02-03 RX ADMIN — SODIUM CHLORIDE, POTASSIUM CHLORIDE, SODIUM LACTATE AND CALCIUM CHLORIDE: 600; 310; 30; 20 INJECTION, SOLUTION INTRAVENOUS at 09:39

## 2017-02-03 RX ADMIN — PROPOFOL 25 MG: 10 INJECTION, EMULSION INTRAVENOUS at 09:56

## 2017-02-03 RX ADMIN — HYDROMORPHONE HYDROCHLORIDE 0.5 MG: 1 INJECTION, SOLUTION INTRAMUSCULAR; INTRAVENOUS; SUBCUTANEOUS at 11:02

## 2017-02-03 RX ADMIN — MIDAZOLAM HYDROCHLORIDE 2 MG: 1 INJECTION, SOLUTION INTRAMUSCULAR; INTRAVENOUS at 09:40

## 2017-02-03 RX ADMIN — ONDANSETRON 4 MG: 2 INJECTION INTRAMUSCULAR; INTRAVENOUS at 09:56

## 2017-02-03 RX ADMIN — DEXMEDETOMIDINE 4 MCG: 100 INJECTION, SOLUTION, CONCENTRATE INTRAVENOUS at 09:50

## 2017-02-03 RX ADMIN — MIDAZOLAM HYDROCHLORIDE 1 MG: 1 INJECTION, SOLUTION INTRAMUSCULAR; INTRAVENOUS at 09:47

## 2017-02-03 RX ADMIN — FENTANYL CITRATE 100 MCG: 50 INJECTION, SOLUTION INTRAMUSCULAR; INTRAVENOUS at 10:06

## 2017-02-03 RX ADMIN — DEXMEDETOMIDINE 8 MCG: 100 INJECTION, SOLUTION, CONCENTRATE INTRAVENOUS at 09:42

## 2017-02-03 RX ADMIN — DEXMEDETOMIDINE 8 MCG: 100 INJECTION, SOLUTION, CONCENTRATE INTRAVENOUS at 09:46

## 2017-02-03 RX ADMIN — LIDOCAINE HYDROCHLORIDE 80 MG: 20 INJECTION, SOLUTION INFILTRATION; PERINEURAL at 09:43

## 2017-02-03 ASSESSMENT — LIFESTYLE VARIABLES: TOBACCO_USE: 0

## 2017-02-03 NOTE — ANESTHESIA PREPROCEDURE EVALUATION
Procedure: Procedure(s):  COMBINED INCISION AND DRAINAGE TRUNK  Preop diagnosis: LEFT BREAST ABSCESS    No Known Allergies  Past Medical History   Diagnosis Date     Abnormal Pap smear 6 years ago-f/u was normal     Herpes simplex without mention of complication no lesions currently     Migraine 2007     Eczema      Past Surgical History   Procedure Laterality Date     No history of surgery       Ent surgery       Prior to Admission medications    Medication Sig Start Date End Date Taking? Authorizing Provider   NONFORMULARY Supplements for migraines   Yes Reported, Patient   doxycycline Monohydrate 100 MG CAPS Take 1 capsule (100 mg) by mouth 2 times daily 2/2/17  Yes Roxi Mcdonnell MD   HYDROcodone-acetaminophen (NORCO) 5-325 MG per tablet Take 1-2 tablets by mouth every 6 hours as needed for moderate to severe pain 2/2/17  Yes Roxi Mcdonnell MD   triamcinolone (KENALOG) 0.5 % cream Apply topically 2 times daily   Yes Reported, Patient     Current Facility-Administered Medications Ordered in Epic   Medication Dose Route Frequency Last Rate Last Dose     ROPivacaine (NAROPIN) epidural    PRN   10 mL at 10/11/13 0511     fentaNYL (SUBLIMAZE) injection   EPIDURAL PRN   100 mcg at 10/11/13 0511     No current [facility]-ordered outpatient prescriptions on file.     Wt Readings from Last 1 Encounters:   02/03/17 78.926 kg (174 lb)     Temp Readings from Last 1 Encounters:   02/03/17 37.2  C (99  F) Oral     BP Readings from Last 6 Encounters:   02/03/17 139/87   02/02/17 138/90   01/29/17 148/100   01/20/17 160/110   01/14/16 139/80   12/22/15 128/74     Pulse Readings from Last 4 Encounters:   02/02/17 67   01/29/17 97   01/20/17 87   01/14/16 74     Resp Readings from Last 1 Encounters:   02/03/17 20     SpO2 Readings from Last 1 Encounters:   02/03/17 99%     Recent Labs   Lab Test  01/29/17   1529  01/20/17   0937   NA  135  141   POTASSIUM  3.3*  3.8   CHLORIDE  102  105   CO2  25  30   ANIONGAP  8  6   GLC  126*   142*   BUN  10  12   CR  0.79  0.75   GLOYR  9.0  8.9     Recent Labs   Lab Test  01/29/17   1529  01/20/17   0938   WBC  15.2*  9.2   HGB  12.7  13.2   PLT  290  358     No results for input(s): INR in the last 49712 hours.    Invalid input(s): APTT   RECENT LABS:   ECG:   ECHO:   CXR:      Anesthesia Evaluation     . Pt has had prior anesthetic.     No history of anesthetic complications     ROS/MED HX    ENT/Pulmonary:      (-) tobacco use and sleep apnea   Neurologic:     (+)migraines,     Cardiovascular:         METS/Exercise Tolerance:     Hematologic:         Musculoskeletal:         GI/Hepatic:         Renal/Genitourinary:         Endo:         Psychiatric:         Infectious Disease:         Malignancy:         Other: Comment: Breast abcess              Physical Exam  Normal systems: cardiovascular and pulmonary    Airway   Mallampati: I  Neck ROM: full    Dental     Cardiovascular       Pulmonary                     Anesthesia Plan      History & Physical Review  History and physical reviewed and following examination; no interval change.    ASA Status:  2 .    NPO Status:  > 8 hours    Plan for MAC Maintenance will be Balanced.  Reason for MAC:  Procedure to face, neck, head or breast  PONV prophylaxis:  Ondansetron (or other 5HT-3)       Postoperative Care  Postoperative pain management:  IV analgesics.      Consents  Anesthetic plan, risks, benefits and alternatives discussed with:  Patient..                          .

## 2017-02-03 NOTE — ANESTHESIA CARE TRANSFER NOTE
Patient: Shannen Enamorado    Procedure(s):  OPEN INCISION AND DRAINAGE LEFT BREAST WITH BIOPSIES - Wound Class: II-Clean Contaminated    Diagnosis: LEFT BREAST ABSCESS  Diagnosis Additional Information: No value filed.    Anesthesia Type:   MAC     Note:  Airway :Room Air  Patient transferred to:PACU  Comments: Pt exhibits spont resps, all monitors and alarms on in pacu, report given to RN, vss.      Vitals: (Last set prior to Anesthesia Care Transfer)    CRNA VITALS  2/3/2017 0950 - 2/3/2017 1028      2/3/2017             NIBP: 110/61 mmHg    NIBP Mean: 77                Electronically Signed By: IRMA Campos CRNA  February 3, 2017  10:28 AM

## 2017-02-03 NOTE — OP NOTE
General Surgery Operative Note    PREOPERATIVE DIAGNOSIS: left breast abscess    POSTOPERATIVE DIAGNOSIS: same    PROCEDURE: incision and drainage of left breast abscess with biospy    SURGEON:  Roxi Mcdonnell MD    ASSISTANT:  Gale Concepcion PA-C  A first assistant was necessary owing to challenging laparoscopic visualization and exposure.  Retraction was also necessary.    ANESTHESIA:  General.    BLOOD LOSS: 20ml    FINDINGS: large superior breast abscess with 20ml drainage, biopsies taken    INDICATIONS:   Ms. Enamorado presented with a left breast abscess which began on January 9th and she has had multiple course of antibiotics as well as two attempts at US guided aspiration with ongoing abscess and significant pain. Given this, she elected to proceed with incision and drainage in the operating room with biopsies to rule out inflammatory breast cancer and granulomatous mastitis.     DETAILS OF PROCEDURE: The patient was brought to the operating room per Anesthesia, placed in supine position, and conscious sedation administered.  A Surgical timeout was then performed, verifying the correct surgeon, site, procedure, and patient and all in the room were in agreement.     There was a 8cm area of erythema and 10cm area of induration on the left breast, with the most significant erythema centered at the 8:00. There is an 0.3cm open draining wound at this area with a large amount of fluctuance superior to the open area. Local anesthetic was injected. A transverse skin incision was made over the area of most fluctuance with significant return of purulent drainage. This was aspirated and cultures sent. The wound was probed in all directions. It did track to the inferior skin opening. Breast tissue was excised from deep in the wound bed and sent for routine evaluation. The inferior skin opening was opened further with a #15 blade to facilitate packing. Skin was excised and sent to pathology for review. Both wounds were  irrigated with copious amounts of sterile saline. Both wounds were packed with 1/2 inch packing gauze and covered with a dry dressing. Pt tolerated the procedure well. Instrument, needle and sponge counts were correct at the conclusion of the procedure.     Roxi Mcdonnell MD

## 2017-02-03 NOTE — DISCHARGE INSTRUCTIONS
YOU HAD ONE NORCO (PAIN PILL) AT 11:15 AM.      Reasons to contact your surgeon:    1. Signs of possible infection: Check your incision daily for redness, swelling, warmth, red streaks or foul drainage.   2. Elevated temperature.  3. Pain not controlled with pain medication and/or rest.   4. Uncontrolled nausea or vomiting.  5. Any questions or concerns.        Same Day Surgery Discharge Instructions for  Sedation and General Anesthesia       It's not unusual to feel dizzy, light-headed or faint for up to 24 hours after surgery or while taking pain medication.  If you have these symptoms: sit for a few minutes before standing and have someone assist you when you get up to walk or use the bathroom.      You should rest and relax for the next 24 hours. We recommend you make arrangements to have an adult stay with you for at least 24 hours after your discharge.  Avoid hazardous and strenuous activity.      DO NOT DRIVE any vehicle or operate mechanical equipment for 24 hours following the end of your surgery.  Even though you may feel normal, your reactions may be affected by the medication you have received.      Do not drink alcoholic beverages for 24 hours following surgery.       Slowly progress to your regular diet as you feel able. It's not unusual to feel nauseated and/or vomit after receiving anesthesia.  If you develop these symptoms, drink clear liquids (apple juice, ginger ale, broth, 7-up, etc. ) until you feel better.  If your nausea and vomiting persists for 24 hours, please notify your surgeon.        All narcotic pain medications, along with inactivity and anesthesia, can cause constipation. Drinking plenty of liquids and increasing fiber intake will help.      For any questions of a medical nature, call your surgeon.      Do not make important decisions for 24 hours.      If you had general anesthesia, you may have a sore throat for a couple of days related to the breathing tube used during surgery.   You may use Cepacol lozenges to help with this discomfort.  If it worsens or if you develop a fever, contact your surgeon.       If you feel your pain is not well managed with the pain medications prescribed by your surgeon, please contact your surgeon's office to let them know so they can address your concerns.

## 2017-02-03 NOTE — ANESTHESIA POSTPROCEDURE EVALUATION
Patient: Shannen Enamorado    Procedure(s):  OPEN INCISION AND DRAINAGE LEFT BREAST WITH BIOPSIES - Wound Class: II-Clean Contaminated    Diagnosis:LEFT BREAST ABSCESS  Diagnosis Additional Information: No value filed.    Anesthesia Type:  MAC    Note:  Anesthesia Post Evaluation    Patient location during evaluation: PACU  Patient participation: Able to fully participate in evaluation  Level of consciousness: awake  Pain management: adequate  Airway patency: patent  Cardiovascular status: acceptable  Respiratory status: acceptable  Hydration status: acceptable  PONV: none     Anesthetic complications: None          Last vitals:  Filed Vitals:    02/03/17 1115 02/03/17 1130 02/03/17 1228   BP: 116/75 110/72 112/71   Temp: 36.6  C (97.8  F)     Resp: 12 14 16   SpO2: 98% 98% 99%         Electronically Signed By: Kvng Robertson MD  February 3, 2017  4:23 PM

## 2017-02-03 NOTE — IP AVS SNAPSHOT
[address], [address]    [facility] [address]    Phone:  [phone]                                       After Visit Summary   2/3/2017    Shannen Enamorado    MRN: [id number]           After Visit Summary Signature Page     I have received my discharge instructions, and my questions have been answered. I have discussed any challenges I see with this plan with the nurse or doctor.    ..........................................................................................................................................  Patient/Patient Representative Signature      ..........................................................................................................................................  Patient Representative Print Name and Relationship to Patient    ..................................................               ................................................  Date                                            Time    ..........................................................................................................................................  Reviewed by Signature/Title    ...................................................              ..............................................  Date                                                            Time

## 2017-02-03 NOTE — IP AVS SNAPSHOT
MRN:[id number]                      After Visit Summary   2/3/2017    Shannen Enamorado    MRN: [id number]           Thank you!     Thank you for choosing [facility] for your care. Our goal is always to provide you with excellent care. Hearing back from our patients is one way we can continue to improve our services. Please take a few minutes to complete the written survey that you may receive in the mail after you visit with us. Thank you!        Patient Information     Date Of Birth          1980        About your hospital stay     You were admitted on:  February 3, 2017 You last received care in the:  [facility] PACU    You were discharged on:  February 3, 2017       Who to Call     For medical emergencies, please call 911.  For non-urgent questions about your medical care, please call your primary care provider or clinic, [phone]  For questions related to your surgery, please call your surgery clinic        Attending Provider     Provider    Roxi Mcdonnell MD       Primary Care Provider Office Phone # Fax #    Swapna Rosas [phone]       [facility] [address] [address]        After Care Instructions     Diet Instructions       Resume pre-procedure diet            Discharge Instructions       CONSTIPATION PREVENTION  Fill the prescription for docusate (colace) provided to you at the hospital in order to prevent constipation. You can stop taking this medication once you are no longer taking your narcotic pain medication and are having regular bowel movements.  If you need an additional stool softener or laxative in order to have regular bowel movements go to a pharmacy and purchase one of the following:    - Senokot oral once daily  - Milk of magnesium once daily  - Miralax 1 scoop/packet 1-2 times daily (laxative)    In addition to the above options, if constipation continues, you can add:   - 4 oz Prune juice  or Plum juice daily for constipation            Discharge Instructions       Return to see Dr. Mcdonnell at the breast center next Tuesday, 2/7/17. Call [phone] to schedule this appointment.            Dressing       WOUND CARE  Remove tape, gauze and packing strips from both incisions on Saturday, 2/4/17. Shower normally, allowing warm water and soap to run over incisions. Pat completely dry. Repack with one long packing strip into each incision, trim at skin. Cover with 4x4 gauze pads and tape. Do this once every day.            Ice to affected area       Ice to operative site as needed            No Alcohol       For 24 hours post procedure            No driving or operating machinery        until the day after procedure                  Further instructions from your care team       YOU HAD ONE NORCO (PAIN PILL) AT 11:15 AM.      Reasons to contact your surgeon:    1. Signs of possible infection: Check your incision daily for redness, swelling, warmth, red streaks or foul drainage.   2. Elevated temperature.  3. Pain not controlled with pain medication and/or rest.   4. Uncontrolled nausea or vomiting.  5. Any questions or concerns.        Same Day Surgery Discharge Instructions for  Sedation and General Anesthesia       It's not unusual to feel dizzy, light-headed or faint for up to 24 hours after surgery or while taking pain medication.  If you have these symptoms: sit for a few minutes before standing and have someone assist you when you get up to walk or use the bathroom.      You should rest and relax for the next 24 hours. We recommend you make arrangements to have an adult stay with you for at least 24 hours after your discharge.  Avoid hazardous and strenuous activity.      DO NOT DRIVE any vehicle or operate mechanical equipment for 24 hours following the end of your surgery.  Even though you may feel normal, your reactions may be affected by the medication you have received.      Do not drink  "alcoholic beverages for 24 hours following surgery.       Slowly progress to your regular diet as you feel able. It's not unusual to feel nauseated and/or vomit after receiving anesthesia.  If you develop these symptoms, drink clear liquids (apple juice, ginger ale, broth, 7-up, etc. ) until you feel better.  If your nausea and vomiting persists for 24 hours, please notify your surgeon.        All narcotic pain medications, along with inactivity and anesthesia, can cause constipation. Drinking plenty of liquids and increasing fiber intake will help.      For any questions of a medical nature, call your surgeon.      Do not make important decisions for 24 hours.      If you had general anesthesia, you may have a sore throat for a couple of days related to the breathing tube used during surgery.  You may use Cepacol lozenges to help with this discomfort.  If it worsens or if you develop a fever, contact your surgeon.       If you feel your pain is not well managed with the pain medications prescribed by your surgeon, please contact your surgeon's office to let them know so they can address your concerns.           Pending Results     Date and Time Order Name Status Description    2/2/2017 1448 Abscess Culture Aerobic Bacterial Preliminary     2/2/2017 1355  Breast Abscess Drain In process             Admission Information        Provider Department Dept Phone    2/3/2017 Roxi Mcdonnell MD  [facility] [phone]      Your Vitals Were     Blood Pressure Temperature Respirations    116/75 mmHg 97.8  F (36.6  C) (Temporal) 12    Height Weight BMI (Body Mass Index)    1.727 m (5' 8\") 78.926 kg (174 lb) 26.46 kg/m2    Pulse Oximetry Last Period       98% 12/23/2016       [employer] Information     [employer] lets you send messages to your doctor, view your test results, renew your prescriptions, schedule appointments and more. To sign up, go to [email or web address] . Click on \"Log in\" on the left side of the screen, which " "will take you to the Welcome page. Then click on \"Sign up Now\" on the right side of the page.     You will be asked to enter the access code listed below, as well as some personal information. Please follow the directions to create your username and password.     Your access code is: [email or web address]  Expires: 2017  8:54 AM     Your access code will  in 90 days. If you need help or a new code, please call your [facility] or [phone].        Care EveryWhere ID     This is your Care EveryWhere ID. This could be used by other organizations to access your [facility] medical records  [id number]           Review of your medicines      START taking        Dose / Directions    CURITY COTTON TIPPED APPLICATR Misc   Used for:  Left breast abscess        Dose:  1 Package   1 Package daily   Quantity:  20 each   Refills:  3       * CURITY PLAIN PACKING STRIP Misc   Used for:  Left breast abscess        Pack both incisions with packing strip once daily. Cover with 4x4\" gauze pads and tape.   Quantity:  1 each   Refills:  2       * Gauze Dressing 4\"X4\" Pads   Used for:  Left breast abscess        Use gauze pads to cover incisions after packing with packing strips.   Quantity:  24 each   Refills:  3       docusate sodium 100 MG tablet   Commonly known as:  COLACE   Used for:  Left breast abscess        Dose:  100 mg   Take 100 mg by mouth 2 times daily   Quantity:  60 tablet   Refills:  1       MEDIPORE SOFT CLOTH TAPE Tape   Used for:  Left breast abscess        Tape over gauze pads once daily to keep gauze and packing in place and prevent drainage from leaking.   Quantity:  1 each   Refills:  2       * Notice:  This list has 2 medication(s) that are the same as other medications prescribed for you. Read the directions carefully, and ask your doctor or other care provider to review them with you.      CONTINUE these medicines which have NOT CHANGED        Dose / Directions    doxycycline Monohydrate 100 MG Caps " "  Used for:  Left breast abscess        Dose:  100 mg   Take 1 capsule (100 mg) by mouth 2 times daily   Quantity:  28 capsule   Refills:  0       HYDROcodone-acetaminophen 5-325 MG per tablet   Commonly known as:  NORCO   Used for:  Left breast abscess        Dose:  1-2 tablet   Take 1-2 tablets by mouth every 6 hours as needed for moderate to severe pain   Quantity:  20 tablet   Refills:  0       NONFORMULARY        Supplements for migraines   Refills:  0       triamcinolone 0.5 % cream   Commonly known as:  KENALOG        Apply topically 2 times daily   Refills:  0            Where to get your medicines      These medications were sent to [facility] [place] [facility] [place] - [address]  [address], [place] [address]     Phone:  [phone]    - CURITY COTTON TIPPED APPLICATR Misc  - docusate sodium 100 MG tablet      Some of these will need a paper prescription and others can be bought over the counter. Ask your nurse if you have questions.     Bring a paper prescription for each of these medications    - CURITY PLAIN PACKING STRIP Misc  - Gauze Dressing 4\"X4\" Pads  - MEDIPORE SOFT CLOTH TAPE Tape             Protect others around you: Learn how to safely use, store and throw away your medicines at www.disposemymeds.org.             Medication List: This is a list of all your medications and when to take them. Check marks below indicate your daily home schedule. Keep this list as a reference.      Medications           Morning Afternoon Evening Bedtime As Needed    CURITY COTTON TIPPED APPLICATR Misc   1 Package daily                                * CURITY PLAIN PACKING STRIP Misc   Pack both incisions with packing strip once daily. Cover with 4x4\" gauze pads and tape.                                * Gauze Dressing 4\"X4\" Pads   Use gauze pads to cover incisions after packing with packing strips.                                docusate sodium 100 MG tablet   Commonly known as:  COLACE "   Take 100 mg by mouth 2 times daily                                doxycycline Monohydrate 100 MG Caps   Take 1 capsule (100 mg) by mouth 2 times daily                                HYDROcodone-acetaminophen 5-325 MG per tablet   Commonly known as:  NORCO   Take 1-2 tablets by mouth every 6 hours as needed for moderate to severe pain   Last time this was given:  1 tablet on 2/3/2017 11:17 AM                                MEDIPORE SOFT CLOTH TAPE Tape   Tape over gauze pads once daily to keep gauze and packing in place and prevent drainage from leaking.                                NONFORMULARY   Supplements for migraines                                triamcinolone 0.5 % cream   Commonly known as:  KENALOG   Apply topically 2 times daily                                * Notice:  This list has 2 medication(s) that are the same as other medications prescribed for you. Read the directions carefully, and ask your doctor or other care provider to review them with you.

## 2017-02-03 NOTE — BRIEF OP NOTE
[facility] Brief Operative Note    Pre-operative diagnosis: LEFT BREAST ABSCESS   Post-operative diagnosis Left breast abscess   Procedure: Procedure(s):  OPEN INCISION AND DRAINAGE LEFT BREAST WITH BIOPSIES - Wound Class: II-Clean Contaminated   Surgeon(s): Surgeon(s) and Role:     * Roxi Mcdonnell MD - Primary     * Gale Concepcion PA-C - Assisting   Estimated blood loss: 20 mL    Specimens:   ID Type Source Tests Collected by Time Destination   1 : left breast abscess Fluid Breast, Left ANAEROBIC BACTERIAL CULTURE, FLUID CULTURE AEROBIC BACTERIAL, GRAM STAIN Roxi Mcdonnell MD 2/3/2017  9:59 AM    2 : left breast abscess Abscess Breast, Left ABSCESS CULTURE AEROBIC BACTERIAL, ANAEROBIC BACTERIAL CULTURE, GRAM STAIN Roxi Mcdonnell MD 2/3/2017 10:00 AM    A : left breast skin biopsy  Tissue Breast, Left SURGICAL PATHOLOGY EXAM, LABORATORY MISCELLANEOUS ORDER Roxi Mcdonnell MD 2/3/2017 10:01 AM    B : left breast tissue biopsy  Tissue Breast, Left SURGICAL PATHOLOGY EXAM, LABORATORY MISCELLANEOUS ORDER Roxi Mcdonnell MD 2/3/2017 10:03 AM       Findings: Biopsies taken. Copiously irrigated. Packed with nu-gauze, covered with fluffs and tape. No immediate complications. See operative report for full details.       Gale Concepcion PA-C  [facility]  [phone]

## 2017-02-04 LAB
BACTERIA SPEC CULT: NO GROWTH
BACTERIA SPEC CULT: NO GROWTH
Lab: NORMAL
Lab: NORMAL
MICRO REPORT STATUS: NORMAL
MICRO REPORT STATUS: NORMAL
SPECIMEN SOURCE: NORMAL
SPECIMEN SOURCE: NORMAL

## 2017-02-04 NOTE — OR NURSING
Post Op call: Patient reported she had just removed the packing from the incision sites and experienced it as extremely painful. She felt dizzy and had to lie down afterwards. Pt felt she would not be able to insert the packing. Advised pt to take pain medication and have another person do the dressing change. Advised also to apply cool packs to surgical site prior to dressing change. Pt verbalized undestanding these instructions. Review additional information on AVS with patient.

## 2017-02-07 ENCOUNTER — OFFICE VISIT (OUTPATIENT)
Dept: SURGERY | Facility: CLINIC | Age: 37
End: 2017-02-07
Payer: COMMERCIAL

## 2017-02-07 VITALS — WEIGHT: 174 LBS | BODY MASS INDEX: 25.77 KG/M2 | TEMPERATURE: 98.4 F | HEIGHT: 69 IN

## 2017-02-07 DIAGNOSIS — Z09 FOLLOW-UP EXAMINATION FOLLOWING COMBINED TREATMENT: Primary | ICD-10-CM

## 2017-02-07 LAB — COPATH REPORT: NORMAL

## 2017-02-07 PROCEDURE — 99024 POSTOP FOLLOW-UP VISIT: CPT | Performed by: SURGERY

## 2017-02-07 RX ORDER — HYDROCODONE BITARTRATE AND ACETAMINOPHEN 5; 325 MG/1; MG/1
1-2 TABLET ORAL EVERY 4 HOURS PRN
Qty: 20 TABLET | Refills: 0 | Status: SHIPPED | OUTPATIENT
Start: 2017-02-07 | End: 2017-03-20 | Stop reason: ALTCHOICE

## 2017-02-07 NOTE — MR AVS SNAPSHOT
"              After Visit Summary   2/7/2017    Shannen Enamorado    MRN: [id number]           Patient Information     Date Of Birth          1980        Visit Information        Provider Department      2/7/2017 11:45 AM Roxi Mcdonnell MD [facility]      Today's Diagnoses     Follow-up examination following combined treatment    -  1        Follow-ups after your visit        Follow-up notes from your care team     Return in about 1 week (around 2/14/2017).      Your next 10 appointments already scheduled     Feb 14, 2017  8:30 AM   Post Op with Roxi Mcdonnell MD   [facility]    [address]  [facility] [address] [phone]              Who to contact     If you have questions or need follow up information about today's clinic visit or your schedule please contact [facility] directly at [phone].  Normal or non-critical lab and imaging results will be communicated to you by [employer], letter or phone within 4 business days after the clinic has received the results. If you do not hear from us within 7 days, please contact the clinic through [employer]lst or phone. If you have a critical or abnormal lab result, we will notify you by phone as soon as possible.  Submit refill requests through [employer] or call your pharmacy and they will forward the refill request to us. Please allow 3 business days for your refill to be completed.          Additional Information About Your Visit        [employer] Information     [employer] lets you send messages to your doctor, view your test results, renew your prescriptions, schedule appointments and more. To sign up, go to [email or web address] . Click on \"Log in\" on the left side of the screen, which will take you to the Welcome page. Then click on \"Sign up Now\" on the right side of the page.     You will be asked to enter the access " "code listed below, as well as some personal information. Please follow the directions to create your username and password.     Your access code is: [email or web address]  Expires: 2017  8:54 AM     Your access code will  in 90 days. If you need help or a new code, please call your [facility] clinic or [phone].        Care EveryWhere ID     This is your Care EveryWhere ID. This could be used by other organizations to access your [facility] medical records  [id number]        Your Vitals Were     Temperature Height BMI (Body Mass Index) Last Period          98.4  F (36.9  C) 5' 9\" (1.753 m) 25.68 kg/m2 2016         Blood Pressure from Last 3 Encounters:   17 112/71   17 138/90   17 148/100    Weight from Last 3 Encounters:   17 174 lb (78.926 kg)   17 174 lb (78.926 kg)   17 175 lb (79.379 kg)              Today, you had the following     No orders found for display         Today's Medication Changes          These changes are accurate as of: 17 12:12 PM.  If you have any questions, ask your nurse or doctor.               These medicines have changed or have updated prescriptions.        Dose/Directions    * HYDROcodone-acetaminophen 5-325 MG per tablet   Commonly known as:  NORCO   This may have changed:  Another medication with the same name was added. Make sure you understand how and when to take each.   Used for:  Left breast abscess   Changed by:  Roxi Mcdonnell MD        Dose:  1-2 tablet   Take 1-2 tablets by mouth every 6 hours as needed for moderate to severe pain   Quantity:  20 tablet   Refills:  0       * HYDROcodone-acetaminophen 5-325 MG per tablet   Commonly known as:  NORCO   This may have changed:  You were already taking a medication with the same name, and this prescription was added. Make sure you understand how and when to take each.   Used for:  Follow-up examination following combined treatment   Changed by:  Roxi Mcdonnell MD        Dose:  " "1-2 tablet   Take 1-2 tablets by mouth every 4 hours as needed for moderate to severe pain   Quantity:  20 tablet   Refills:  0       * Notice:  This list has 2 medication(s) that are the same as other medications prescribed for you. Read the directions carefully, and ask your doctor or other care provider to review them with you.         Where to get your medicines      Some of these will need a paper prescription and others can be bought over the counter.  Ask your nurse if you have questions.     Bring a paper prescription for each of these medications    - HYDROcodone-acetaminophen 5-325 MG per tablet             Primary Care Provider Office Phone # Fax #    Swapna Rosas [phone]       [facility] [address] [address]        Thank you!     Thank you for choosing [facility]  for your care. Our goal is always to provide you with excellent care. Hearing back from our patients is one way we can continue to improve our services. Please take a few minutes to complete the written survey that you may receive in the mail after your visit with us. Thank you!             Your Updated Medication List - Protect others around you: Learn how to safely use, store and throw away your medicines at www.disposemymeds.org.          This list is accurate as of: 2/7/17 12:12 PM.  Always use your most recent med list.                   Brand Name Dispense Instructions for use    CURITY COTTON TIPPED APPLICATR Misc     20 each    1 Package daily       * CURITY PLAIN PACKING STRIP Misc     1 each    Pack both incisions with packing strip once daily. Cover with 4x4\" gauze pads and tape.       * Gauze Dressing 4\"X4\" Pads     24 each    Use gauze pads to cover incisions after packing with packing strips.       docusate sodium 100 MG tablet    COLACE    60 tablet    Take 100 mg by mouth 2 times daily       doxycycline Monohydrate 100 MG Caps     28 capsule    Take " 1 capsule (100 mg) by mouth 2 times daily       * HYDROcodone-acetaminophen 5-325 MG per tablet    NORCO    20 tablet    Take 1-2 tablets by mouth every 6 hours as needed for moderate to severe pain       * HYDROcodone-acetaminophen 5-325 MG per tablet    NORCO    20 tablet    Take 1-2 tablets by mouth every 4 hours as needed for moderate to severe pain       MEDIPORE SOFT CLOTH TAPE Tape     1 each    Tape over gauze pads once daily to keep gauze and packing in place and prevent drainage from leaking.       NONFORMULARY      Supplements for migraines       triamcinolone 0.5 % cream    KENALOG     Apply topically 2 times daily       * Notice:  This list has 4 medication(s) that are the same as other medications prescribed for you. Read the directions carefully, and ask your doctor or other care provider to review them with you.

## 2017-02-07 NOTE — LETTER
2017    Surgery Postoperative Note    RE:  Shannen Enamorado-:  [birth year]     S: Jocy returns today for a recheck of her left breast abscess. She is status left breast post I&D with biopsy and cultures. Her biopsy revealed benign tissue, no concern for inflammatory cancer or granulomatous mastitis. She reports she had significant pain with removal of the packing. She denies fevers and chills. The overall pain has improved and she is doing daily dressing changes with packing.      Left breast: incisions packing removed. Healthy granulation tissue at base. Ongoing surrounding inflammation medially, no fluctuance, improving skin erythema      Pathology: A. Skin, left breast, biopsy   - Benign skin with marked perivascular acute and chronic inflammation   - No evidence of malignancy (cytokeratin markers negative)     B., Left breast, biopsy   - Benign stroma with acute and chronic inflammation   - No evidence of malignancy (cytokeratin markers negative)      A/P  Shannen Enamorado is recovering from a incision and drainage with biopsy of left breast abscess. We reviewed her pathology today as well. Continue daily dressing changes with packing. Continue doxycyline. Follow up in 1 week for wound check.      Thank you for the opportunity to help in her care.     Roxi Mcdonnell  [facility]  [phone]

## 2017-02-07 NOTE — PROGRESS NOTES
Surgery Postoperative Note    S: Jocy returns today for a recheck of her left breast abscess.  She is status left breast post I&D with biopsy and cultures.  Her biopsy revealed benign tissue, no concern for inflammatory cancer or granulomatous mastitis.  She reports she had significant pain with removal of the packing. She denies fevers and chills. The overall pain has improved and she is doing daily dressing changes with packing.     Left breast: incisions packing removed. Healthy granulation tissue at base. Ongoing surrounding inflammation medially, no fluctuance, improving skin erythema     Pathology: A. Skin, left breast, biopsy   - Benign skin with marked perivascular acute and chronic inflammation   - No evidence of malignancy (cytokeratin markers negative)     B., Left breast, biopsy   - Benign stroma with acute and chronic inflammation   - No evidence of malignancy (cytokeratin markers negative)     A/P  Shannen Enamorado is recovering from a incision and drainage with biopsy of left breast abscess.  We reviewed her pathology today as well. Continue daily dressing changes with packing. Continue doxycyline. Follow up in 1 week for wound check.     Thank you for the opportunity to help in her care.    Roxi Mcdonnell  [facility]  [phone]    Please route or send letter to:  Primary Care Provider (PCP) and Referring Provider

## 2017-02-08 LAB
BACTERIA SPEC CULT: NO GROWTH
BACTERIA SPEC CULT: NO GROWTH
Lab: NORMAL
MICRO REPORT STATUS: NORMAL
MICRO REPORT STATUS: NORMAL
SPECIMEN SOURCE: NORMAL
SPECIMEN SOURCE: NORMAL

## 2017-02-09 ENCOUNTER — HOSPITAL ENCOUNTER (OUTPATIENT)
Dept: MAMMOGRAPHY | Facility: CLINIC | Age: 37
End: 2017-02-09
Attending: SURGERY
Payer: COMMERCIAL

## 2017-02-09 ENCOUNTER — TELEPHONE (OUTPATIENT)
Dept: SURGERY | Facility: CLINIC | Age: 37
End: 2017-02-09

## 2017-02-09 ENCOUNTER — OFFICE VISIT (OUTPATIENT)
Dept: SURGERY | Facility: CLINIC | Age: 37
End: 2017-02-09
Payer: COMMERCIAL

## 2017-02-09 ENCOUNTER — HOSPITAL ENCOUNTER (OUTPATIENT)
Dept: MAMMOGRAPHY | Facility: CLINIC | Age: 37
Discharge: HOME | End: 2017-02-09
Attending: SURGERY | Admitting: SURGERY
Payer: COMMERCIAL

## 2017-02-09 VITALS
DIASTOLIC BLOOD PRESSURE: 80 MMHG | WEIGHT: 175 LBS | SYSTOLIC BLOOD PRESSURE: 125 MMHG | HEART RATE: 81 BPM | HEIGHT: 68 IN | BODY MASS INDEX: 26.52 KG/M2

## 2017-02-09 DIAGNOSIS — Z09 FOLLOW-UP EXAMINATION FOLLOWING COMBINED TREATMENT: ICD-10-CM

## 2017-02-09 DIAGNOSIS — Z09 FOLLOW-UP EXAMINATION FOLLOWING COMBINED TREATMENT: Primary | ICD-10-CM

## 2017-02-09 LAB
BACTERIA SPEC CULT: ABNORMAL
GRAM STN SPEC: NORMAL
GRAM STN SPEC: NORMAL
MICRO REPORT STATUS: ABNORMAL
MICRO REPORT STATUS: NORMAL
MICRO REPORT STATUS: NORMAL
SPECIMEN SOURCE: ABNORMAL
SPECIMEN SOURCE: NORMAL
SPECIMEN SOURCE: NORMAL

## 2017-02-09 PROCEDURE — 87070 CULTURE OTHR SPECIMN AEROBIC: CPT | Performed by: SURGERY

## 2017-02-09 PROCEDURE — 99024 POSTOP FOLLOW-UP VISIT: CPT | Performed by: SURGERY

## 2017-02-09 PROCEDURE — 76942 ECHO GUIDE FOR BIOPSY: CPT

## 2017-02-09 PROCEDURE — 76642 ULTRASOUND BREAST LIMITED: CPT | Mod: LT

## 2017-02-09 PROCEDURE — 87205 SMEAR GRAM STAIN: CPT | Performed by: SURGERY

## 2017-02-09 NOTE — TELEPHONE ENCOUNTER
Pt with new area of redness/erythema. She is able to see me in clinic this afternoon.     Roxi Mcdonnell MD  [facility]  [phone]

## 2017-02-09 NOTE — MR AVS SNAPSHOT
After Visit Summary   2/9/2017    Shannen Enamorado    MRN: [id number]           Patient Information     Date Of Birth          1980        Visit Information        Provider Department      2/9/2017 1:45 PM Roxi Mcdonnell MD [facility]      Today's Diagnoses     Follow-up examination following combined treatment    -  1        Follow-ups after your visit        Your next 10 appointments already scheduled     Feb 14, 2017  8:30 AM   Post Op with Roxi Mcdonnell MD   [facility]    [address]  [facility] [id number] [phone]              Future tests that were ordered for you today     Open Future Orders        Priority Expected Expires Ordered    US Breast Abscess Drain Routine  2/9/2018 2/9/2017    US Breast Left Limited 1-3 Quadrants Routine 2/9/2017 2/9/2018 2/9/2017            Who to contact     If you have questions or need follow up information about today's clinic visit or your schedule please contact [facility] directly at [phone].  Normal or non-critical lab and imaging results will be communicated to you by MyChart, letter or phone within 4 business days after the clinic has received the results. If you do not hear from us within 7 days, please contact the clinic through [employer] or phone. If you have a critical or abnormal lab result, we will notify you by phone as soon as possible.  Submit refill requests through [employer] or call your pharmacy and they will forward the refill request to us. Please allow 3 business days for your refill to be completed.          Additional Information About Your Visit        MyChart Information     Well[employer] lets you send messages to your doctor, view your test results, renew your prescriptions, schedule appointments and more. To sign up, go to [email or web address] .  "Click on \"Log in\" on the left side of the screen, which will take you to the Welcome page. Then click on \"Sign up Now\" on the right side of the page.     You will be asked to enter the access code listed below, as well as some personal information. Please follow the directions to create your username and password.     Your access code is: [email or web address]  Expires: 2017  8:54 AM     Your access code will  in 90 days. If you need help or a new code, please call your [facility] clinic or [phone].        Care EveryWhere ID     This is your Care EveryWhere ID. This could be used by other organizations to access your [facility] medical records  [id number]        Your Vitals Were     Pulse Height BMI (Body Mass Index) Last Period          81 5' 8\" (1.727 m) 26.61 kg/m2 2016         Blood Pressure from Last 3 Encounters:   17 125/80   17 112/71   17 138/90    Weight from Last 3 Encounters:   17 175 lb (79.379 kg)   17 174 lb (78.926 kg)   17 174 lb (78.926 kg)                 Today's Medication Changes          These changes are accurate as of: 17  2:41 PM.  If you have any questions, ask your nurse or doctor.               Start taking these medicines.        Dose/Directions    amoxicillin-clavulanate 875-125 MG per tablet   Commonly known as:  AUGMENTIN   Used for:  Follow-up examination following combined treatment   Started by:  Roxi Mcdonnell MD        Dose:  1 tablet   Take 1 tablet by mouth 2 times daily   Quantity:  28 tablet   Refills:  0            Where to get your medicines      These medications were sent to [facility] - [place], [place] - [address], [address]  [address], [facility] [address], [place] [address]     Phone:  [phone]    - amoxicillin-clavulanate 875-125 MG per tablet             Primary Care Provider Office Phone # Fax #    Swapna Rosas [phone]       [facility] [address] " "[address] [address]        Thank you!     Thank you for choosing [facility]  for your care. Our goal is always to provide you with excellent care. Hearing back from our patients is one way we can continue to improve our services. Please take a few minutes to complete the written survey that you may receive in the mail after your visit with us. Thank you!             Your Updated Medication List - Protect others around you: Learn how to safely use, store and throw away your medicines at www.disposemymeds.org.          This list is accurate as of: 2/9/17  2:41 PM.  Always use your most recent med list.                   Brand Name Dispense Instructions for use    amoxicillin-clavulanate 875-125 MG per tablet    AUGMENTIN    28 tablet    Take 1 tablet by mouth 2 times daily       CURITY COTTON TIPPED APPLICATR Misc     20 each    1 Package daily       * CURITY PLAIN PACKING STRIP Misc     1 each    Pack both incisions with packing strip once daily. Cover with 4x4\" gauze pads and tape.       * Gauze Dressing 4\"X4\" Pads     24 each    Use gauze pads to cover incisions after packing with packing strips.       docusate sodium 100 MG tablet    COLACE    60 tablet    Take 100 mg by mouth 2 times daily       doxycycline Monohydrate 100 MG Caps     28 capsule    Take 1 capsule (100 mg) by mouth 2 times daily       * HYDROcodone-acetaminophen 5-325 MG per tablet    NORCO    20 tablet    Take 1-2 tablets by mouth every 6 hours as needed for moderate to severe pain       * HYDROcodone-acetaminophen 5-325 MG per tablet    NORCO    20 tablet    Take 1-2 tablets by mouth every 4 hours as needed for moderate to severe pain       MEDIPORE SOFT CLOTH TAPE Tape     1 each    Tape over gauze pads once daily to keep gauze and packing in place and prevent drainage from leaking.       NONFORMULARY      Supplements for migraines       triamcinolone 0.5 % cream    KENALOG     Apply topically 2 times " daily       * Notice:  This list has 4 medication(s) that are the same as other medications prescribed for you. Read the directions carefully, and ask your doctor or other care provider to review them with you.

## 2017-02-09 NOTE — TELEPHONE ENCOUNTER
Patient called back, discussed that she has a new area on her breast that has become firm and is about 1.5inches in diameter and is warm to the touch, denies fever. Told patient that we could have her continue to monitor the area, but otherwise will page Dr. Mcdonnell to call her since patient is going out of town next week. Paged Dr. Mcdonnell to call patient. Patient will wait for return call.    Christina Guardado RN BSN

## 2017-02-09 NOTE — LETTER
2017    Surgery Postoperative Note    RE:  Shannen Enamorado-:  [birth year]     S: Shannen returns to clinic today after having noted increased erythema and pain at the superior aspect of her left breast. A saw her in clinic on Tuesday for a postoperative evaluation after a left breast I&D for an abscess. Incisions were improving as surrounding erythema and induration had improved. She noted yesterday that she had increased pain on the superior part of that breast as well as an area that felt like there may be fluid. She denies fevers. She feels the medial aspect near her surgical incisions improved. She continues with daily dressing changes with packing to these areas. She's been taking doxycycline as prescribed.     Left breast: Medial incisions with packing in place with granulation tissue at the base, surrounding induration and erythema improved from prior. New area of induration and erythema superior to the nipple at 12:00 with palpable fluctuance.     Pathology: Benign breast and skin tissue  Cultures: Corynebacterium      A/P  Shannen Enamorado is a 36-year-old female who is status post left breast incision and drainage with biopsy on February 3 who returns today with a new area of concern on her left breast. The superior aspect of the breast is definitely more erythematous and there is fluctuance concerning for abscess. I discussed options again with her including on ultrasound to evaluate for deeper abscess with possible ultrasound-guided drainage versus the surgical incision and drainage. She would like to proceed with ultrasound and possible drainage today. Her cultures have grown Corynebacterium which should be covered by doxycycline however in the setting of ongoing infection I do think we should add Augmentin to her antibiotic regimen. Her pathology as reported previously revealed benign breast and skin tissue with inflammation but no concern for malignancy or granulomatous mastitis  on the specimen sent.           Thank you for the opportunity to help in her care.     Roxi Mcdonnell  [facility]  [phone]

## 2017-02-09 NOTE — TELEPHONE ENCOUNTER
Name of caller: Shannen Enamorado    Reason for Call:  Patient recently saw Dr. Mcdonnell and now has another area of concern in her breast    Surgeon:  Dr. Mcdonnell    Recent Surgery:  No    If yes, when & what type:  N/A      Best phone number to reach pt at is: [phone]  Ok to leave a message with medical info? No    Pharmacy preferred (if calling for a refill): NA

## 2017-02-09 NOTE — PROGRESS NOTES
Surgery Postoperative Note    S: Shannen returns to clinic today after having noted increased erythema and pain at the superior aspect of her left breast.  A saw her in clinic on Tuesday for a postoperative evaluation after a left breast I&D for an abscess.  Incisions were improving as surrounding erythema and induration had improved.  She noted yesterday that she had increased pain on the superior part of that breast as well as an area that felt like there may be fluid.  She denies fevers.  She feels the medial aspect near her surgical incisions improved.  She continues with daily dressing changes with packing to these areas.  She's been taking doxycycline as prescribed.    Left breast: Medial incisions with packing in place with granulation tissue at the base, surrounding induration and erythema improved from prior.  New area of induration and erythema superior to the nipple at 12:00 with palpable fluctuance.    Pathology: Benign breast and skin tissue  Cultures: Corynebacterium     A/P  Shannen Enamorado is a 36-year-old female who is status post left breast incision and drainage with biopsy on February 3 who returns today with a new area of concern on her left breast.  The superior aspect of the breast is definitely more erythematous and there is fluctuance concerning for abscess.  I discussed options again with her including on ultrasound to evaluate for deeper abscess with possible ultrasound-guided drainage versus the surgical incision and drainage.  She would like to proceed with ultrasound and possible drainage today.  Her cultures have grown Corynebacterium which should be covered by doxycycline however in the setting of ongoing infection I do think we should add Augmentin to her antibiotic regimen.  Her pathology as reported previously revealed benign breast and skin tissue with inflammation but no concern for malignancy or granulomatous mastitis on the specimen sent.        Thank you for the  opportunity to help in her care.    Roxi Mcdonnell  [facility]  [phone]    Please route or send letter to:  Primary Care Provider (PCP) and Referring Provider

## 2017-02-09 NOTE — TELEPHONE ENCOUNTER
Attempted to call patient back, left generic message from clinic and call back phone number.     Christina Guardado RN BSN

## 2017-02-09 NOTE — PROGRESS NOTES
Surgery Postoperative Note    S: Shannen returns to clinic today after having noted increased erythema and pain at the superior aspect of her left breast.  A saw her in clinic on Tuesday for a postoperative evaluation after a left breast I&D for an abscess.  Incisions were improving as surrounding erythema and induration had improved.  She noted yesterday that she had increased pain on the superior part of that breast as well as an area that felt like there may be fluid.  She denies fevers.  She feels the medial aspect near her surgical incisions improved.  She continues with daily dressing changes with packing to these areas.  She's been taking doxycycline as prescribed.    Left breast: Medial incisions with packing in place with granulation tissue at the base, surrounding induration and erythema improved from prior.  New area of induration and erythema superior to the nipple at 12:00 with palpable fluctuance.    Pathology: Benign breast and skin tissue  Cultures: Corynebacterium     A/P  Shannen Enamorado is a 36-year-old female who is status post left breast incision and drainage with biopsy on February 3 who returns today with a new area of concern on her left breast.  The superior aspect of the breast is definitely more erythematous and there is fluctuance concerning for abscess.  I discussed options again with her including on ultrasound to evaluate for deeper abscess with possible ultrasound-guided drainage versus the surgical incision and drainage.  She would like to proceed with ultrasound and possible drainage today.  Her cultures have grown Corynebacterium which should be covered by doxycycline however in the setting of ongoing infection I do think we should add Augmentin to her antibiotic regimen.  Her pathology as reported previously revealed benign breast and skin tissue with inflammation but no concern for malignancy or granulomatous mastitis on the specimen sent.        Thank you for the  opportunity to help in her care.    Roxi Mcdonnell  [facility]  [phone]    Please route or send letter to:  Primary Care Provider (PCP) and Referring Provider

## 2017-02-10 LAB
BACTERIA SPEC CULT: ABNORMAL
BACTERIA SPEC CULT: NORMAL
Lab: ABNORMAL
Lab: NORMAL
MICRO REPORT STATUS: ABNORMAL
MICRO REPORT STATUS: NORMAL
SPECIMEN SOURCE: ABNORMAL
SPECIMEN SOURCE: NORMAL

## 2017-02-13 ENCOUNTER — TELEPHONE (OUTPATIENT)
Dept: SURGERY | Facility: CLINIC | Age: 37
End: 2017-02-13

## 2017-02-13 NOTE — TELEPHONE ENCOUNTER
RN notified of patient's call, will let Dr. Mcdonnell know. Patient scheduled for follow-up 2/14/17 8:30am    Christina Guardado RN BSN

## 2017-02-13 NOTE — TELEPHONE ENCOUNTER
Name of caller: Patient    Reason for Call:  Wanted to let Dr Mcdonnell know that the antibiotics were not working and would be prepared for her to go back in tomorrow at her appointment.    Surgeon:  Dr. Mcdonnell    Recent Surgery:  Yes.    If yes, when & what type:  I&D left breast with biopsy,02/03/17      Best phone number to reach pt at is: [phone]  Ok to leave a message with medical info? Yes.    Pharmacy preferred (if calling for a refill): na

## 2017-02-14 ENCOUNTER — OFFICE VISIT (OUTPATIENT)
Dept: SURGERY | Facility: CLINIC | Age: 37
End: 2017-02-14
Payer: COMMERCIAL

## 2017-02-14 DIAGNOSIS — Z09 SURGERY FOLLOW-UP: Primary | ICD-10-CM

## 2017-02-14 LAB
BACTERIA SPEC CULT: NO GROWTH
MICRO REPORT STATUS: NORMAL
SPECIMEN SOURCE: NORMAL

## 2017-02-14 PROCEDURE — 99024 POSTOP FOLLOW-UP VISIT: CPT | Performed by: SURGERY

## 2017-02-14 RX ORDER — HYDROCODONE BITARTRATE AND ACETAMINOPHEN 5; 325 MG/1; MG/1
1-2 TABLET ORAL EVERY 4 HOURS PRN
Qty: 20 TABLET | Refills: 0 | Status: SHIPPED | OUTPATIENT
Start: 2017-02-14 | End: 2017-06-05

## 2017-02-14 NOTE — PROGRESS NOTES
Surgery Postoperative Note    S: Ms. Enamorado returns today for follow up of a left breast abscess. She reports she believes there is a new area on the left medial breast near the open wound from her I&D which is more firm. She denies fevers. She was able to work all day yesterday. She is leaving town for her sister's wedding in [place] on Thursday and is very concerned she needs another I&D.     Left breast: improving erythema and induration throughout the breast, continues to be very firm at 1200. Ecchymosis from US guided aspiration across upper outer breast. Two wounds medial at 9:00 and 8:00 with healthy granulation tissue at base. No purulence. No fluctuance palpable throughout breast. Nipple appears improved from prior with less inversion        A/P  Shannen Enamorado is recovering from a left breast incision and drainage on 2/3/3017. She also had an US guided drainage last week. Her breast overall appears slightly improved. She continues on Augmentin and doxycycline. Her pathology was not concerning for inflammatory breast cancer or granulomatous mastitis. We discussed options and I feel that we need to give the breast more time to improve. There is no need for surgery today. She is very nervous about traveling and if she were to need intervention while she is gone. She does not feel she can cancel her trip. I advised her to call our clinic if she is having worsening erythema, purulent drainage or fevers and we can discuss symptoms with her and if we recommend evaluation. I gave her an Rx for norco for ongoing pain. She will follow up with me next week Thursday or Friday.     Thank you for the opportunity to help in her care.    Roxi Mcdonnell  [facility]  [phone]    Please route or send letter to:  Primary Care Provider (PCP) and Referring Provider

## 2017-02-14 NOTE — MR AVS SNAPSHOT
"              After Visit Summary   2017    Shannen Enamorado    MRN: [id number]           Patient Information     Date Of Birth          1980        Visit Information        Provider Department      2017 8:30 AM Roxi Mcdonnell MD [facility]      Today's Diagnoses     Surgery follow-up    -  1       Follow-ups after your visit        Follow-up notes from your care team     Return in about 1 week (around 2017).      Who to contact     If you have questions or need follow up information about today's clinic visit or your schedule please contact [facility] directly at [phone].  Normal or non-critical lab and imaging results will be communicated to you by [employer], letter or phone within 4 business days after the clinic has received the results. If you do not hear from us within 7 days, please contact the clinic through [employer] or phone. If you have a critical or abnormal lab result, we will notify you by phone as soon as possible.  Submit refill requests through [employer] or call your pharmacy and they will forward the refill request to us. Please allow 3 business days for your refill to be completed.          Additional Information About Your Visit        MyChart Information     [employer] lets you send messages to your doctor, view your test results, renew your prescriptions, schedule appointments and more. To sign up, go to [email or web address] . Click on \"Log in\" on the left side of the screen, which will take you to the Welcome page. Then click on \"Sign up Now\" on the right side of the page.     You will be asked to enter the access code listed below, as well as some personal information. Please follow the directions to create your username and password.     Your access code is: [email or web address]  Expires: 2017  8:54 AM     Your access code will  in 90 days. If you need help or a new code, please call " your [facility] clinic or [phone].        Care EveryWhere ID     This is your Care EveryWhere ID. This could be used by other organizations to access your [facility] medical records  [id number]        Your Vitals Were     Last Period                   12/23/2016            Blood Pressure from Last 3 Encounters:   02/09/17 125/80   02/03/17 112/71   02/02/17 138/90    Weight from Last 3 Encounters:   02/09/17 175 lb (79.4 kg)   02/07/17 174 lb (78.9 kg)   02/03/17 174 lb (78.9 kg)              Today, you had the following     No orders found for display         Today's Medication Changes          These changes are accurate as of: 2/14/17  9:14 AM.  If you have any questions, ask your nurse or doctor.               These medicines have changed or have updated prescriptions.        Dose/Directions    * HYDROcodone-acetaminophen 5-325 MG per tablet   Commonly known as:  NORCO   This may have changed:  Another medication with the same name was added. Make sure you understand how and when to take each.   Used for:  Left breast abscess   Changed by:  Roxi Mcdonnell MD        Dose:  1-2 tablet   Take 1-2 tablets by mouth every 6 hours as needed for moderate to severe pain   Quantity:  20 tablet   Refills:  0       * HYDROcodone-acetaminophen 5-325 MG per tablet   Commonly known as:  NORCO   This may have changed:  Another medication with the same name was added. Make sure you understand how and when to take each.   Used for:  Follow-up examination following combined treatment   Changed by:  Roxi Mcdonnell MD        Dose:  1-2 tablet   Take 1-2 tablets by mouth every 4 hours as needed for moderate to severe pain   Quantity:  20 tablet   Refills:  0       * HYDROcodone-acetaminophen 5-325 MG per tablet   Commonly known as:  NORCO   This may have changed:  You were already taking a medication with the same name, and this prescription was added. Make sure you understand how and when to take each.   Used for:  Surgery  "follow-up   Changed by:  Roxi Mcdonnell MD        Dose:  1-2 tablet   Take 1-2 tablets by mouth every 4 hours as needed for moderate to severe pain   Quantity:  20 tablet   Refills:  0       * Notice:  This list has 3 medication(s) that are the same as other medications prescribed for you. Read the directions carefully, and ask your doctor or other care provider to review them with you.         Where to get your medicines      Some of these will need a paper prescription and others can be bought over the counter.  Ask your nurse if you have questions.     Bring a paper prescription for each of these medications     HYDROcodone-acetaminophen 5-325 MG per tablet                Primary Care Provider Office Phone # Fax #    Swapna Rosas [phone]       [facility] [address] [facility] [address]  [facility] [address]        Thank you!     Thank you for choosing [facility]  for your care. Our goal is always to provide you with excellent care. Hearing back from our patients is one way we can continue to improve our services. Please take a few minutes to complete the written survey that you may receive in the mail after your visit with us. Thank you!             Your Updated Medication List - Protect others around you: Learn how to safely use, store and throw away your medicines at www.disposemymeds.org.          This list is accurate as of: 2/14/17  9:14 AM.  Always use your most recent med list.                   Brand Name Dispense Instructions for use    amoxicillin-clavulanate 875-125 MG per tablet    AUGMENTIN    28 tablet    Take 1 tablet by mouth 2 times daily       CURITY COTTON TIPPED APPLICATR Misc     20 each    1 Package daily       * CURITY PLAIN PACKING STRIP Misc     1 each    Pack both incisions with packing strip once daily. Cover with 4x4\" gauze pads and tape.       * Gauze Dressing 4\"X4\" Pads     24 each    Use gauze pads to cover incisions after packing " with packing strips.       docusate sodium 100 MG tablet    COLACE    60 tablet    Take 100 mg by mouth 2 times daily       doxycycline Monohydrate 100 MG Caps     28 capsule    Take 1 capsule (100 mg) by mouth 2 times daily       * HYDROcodone-acetaminophen 5-325 MG per tablet    NORCO    20 tablet    Take 1-2 tablets by mouth every 6 hours as needed for moderate to severe pain       * HYDROcodone-acetaminophen 5-325 MG per tablet    NORCO    20 tablet    Take 1-2 tablets by mouth every 4 hours as needed for moderate to severe pain       * HYDROcodone-acetaminophen 5-325 MG per tablet    NORCO    20 tablet    Take 1-2 tablets by mouth every 4 hours as needed for moderate to severe pain       MEDIPORE SOFT CLOTH TAPE Tape     1 each    Tape over gauze pads once daily to keep gauze and packing in place and prevent drainage from leaking.       NONFORMULARY      Supplements for migraines       triamcinolone 0.5 % cream    KENALOG     Apply topically 2 times daily       * Notice:  This list has 5 medication(s) that are the same as other medications prescribed for you. Read the directions carefully, and ask your doctor or other care provider to review them with you.

## 2017-02-23 ENCOUNTER — OFFICE VISIT (OUTPATIENT)
Dept: SURGERY | Facility: CLINIC | Age: 37
End: 2017-02-23
Payer: COMMERCIAL

## 2017-02-23 VITALS
DIASTOLIC BLOOD PRESSURE: 110 MMHG | HEIGHT: 69 IN | WEIGHT: 170 LBS | SYSTOLIC BLOOD PRESSURE: 153 MMHG | BODY MASS INDEX: 25.18 KG/M2 | HEART RATE: 93 BPM

## 2017-02-23 DIAGNOSIS — N61.1 LEFT BREAST ABSCESS: Primary | ICD-10-CM

## 2017-02-23 PROCEDURE — 99024 POSTOP FOLLOW-UP VISIT: CPT | Performed by: SURGERY

## 2017-02-23 NOTE — LETTER
2017    Surgery Postoperative Note    RE:  Shannen Enamorado-: [birth year]     S: Ms. Enamorado returns today for recheck of her left breast abscess. She was able to attend her sister's wedding last week in [place] uneventfully. She feels her left breast overall is improving. She did have a new area which drained purulent fluid erupt yesterday. She has been applying warm compresses to her breast. She has one dose left of augmentin. She denies fevers. She has not required any pain medications. She is no longer packing the open wounds from the I&D.      Left Breast: prior I&D wounds healing well, inferior site healed, new 2mm opening just medial to the I&D site with small amount of drainage. With palpation to the superior I&D wound there was also a small amount of purulent drainage expressed. There is no underlying palpable fluctuance. Erythema improving.      A/P  Shannen Enamorado is recovering from a excisional biopsy of her left breast and Incision and drainage of left breast abscess. She continues to have ongoing drainage from the breast but overall is improving. There is nothing to perform I&D on as there is no palpable fluctuance. I do think she will need a follow up ultrasound in a couple weeks as things hopefully continue to improve. As she has been improving on augmentin - we will continue this for one more week. 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 If she continues to have ongoing disease, it may be reasonable to try steroid treatment empirically. We again discussed malignancy as well and with negative biopsies the risk is low. Follow up imaging will be helpful. I will see her again next week.         Thank you for the opportunity to help in her care.     Roxi Mcdonnell  [facility]  [phone]

## 2017-02-23 NOTE — MR AVS SNAPSHOT
"              After Visit Summary   2017    Shannen Enamorado    MRN: [id number]           Patient Information     Date Of Birth          1980        Visit Information        Provider Department      2017 1:30 PM Roxi Mcdonnell MD [facility] Surgery      Today's Diagnoses     Left breast abscess    -  1       Follow-ups after your visit        Who to contact     If you have questions or need follow up information about today's clinic visit or your schedule please contact [facility] directly at [phone].  Normal or non-critical lab and imaging results will be communicated to you by smartcliphart, letter or phone within 4 business days after the clinic has received the results. If you do not hear from us within 7 days, please contact the clinic through smartcliphart or phone. If you have a critical or abnormal lab result, we will notify you by phone as soon as possible.  Submit refill requests through 33Across or call your pharmacy and they will forward the refill request to us. Please allow 3 business days for your refill to be completed.          Additional Information About Your Visit        MyChart Information     33Across lets you send messages to your doctor, view your test results, renew your prescriptions, schedule appointments and more. To sign up, go to [email or web address] . Click on \"Log in\" on the left side of the screen, which will take you to the Welcome page. Then click on \"Sign up Now\" on the right side of the page.     You will be asked to enter the access code listed below, as well as some personal information. Please follow the directions to create your username and password.     Your access code is: [email or web address]  Expires: 2017  8:54 AM     Your access code will  in 90 days. If you need help or a new code, please call your [place] clinic or [phone].        Care " "EveryWhere ID     This is your Care EveryWhere ID. This could be used by other organizations to access your [place] medical records  [id number]        Your Vitals Were     Pulse Height Last Period BMI (Body Mass Index)          93 5' 9\" (1.753 m) 12/23/2016 25.1 kg/m2         Blood Pressure from Last 3 Encounters:   02/23/17 (!) 153/110   02/09/17 125/80   02/03/17 112/71    Weight from Last 3 Encounters:   02/23/17 170 lb (77.1 kg)   02/09/17 175 lb (79.4 kg)   02/07/17 174 lb (78.9 kg)              Today, you had the following     No orders found for display         Today's Medication Changes          These changes are accurate as of: 2/23/17  2:06 PM.  If you have any questions, ask your nurse or doctor.               These medicines have changed or have updated prescriptions.        Dose/Directions    * amoxicillin-clavulanate 875-125 MG per tablet   Commonly known as:  AUGMENTIN   This may have changed:  Another medication with the same name was added. Make sure you understand how and when to take each.   Used for:  Follow-up examination following combined treatment   Changed by:  Roxi Mcdonnell MD        Dose:  1 tablet   Take 1 tablet by mouth 2 times daily   Quantity:  28 tablet   Refills:  0       * amoxicillin-clavulanate 875-125 MG per tablet   Commonly known as:  AUGMENTIN   This may have changed:  You were already taking a medication with the same name, and this prescription was added. Make sure you understand how and when to take each.   Used for:  Left breast abscess   Changed by:  Roxi Mcdonnell MD        Dose:  1 tablet   Take 1 tablet by mouth 2 times daily for 7 days   Quantity:  14 tablet   Refills:  0       * Notice:  This list has 2 medication(s) that are the same as other medications prescribed for you. Read the directions carefully, and ask your doctor or other care provider to review them with you.         Where to get your medicines      These medications [facility] to [id number] - [place], [place] - [address]  [address] [address]     Phone:  [phone]     amoxicillin-clavulanate 875-125 MG per tablet                Primary Care Provider Office Phone # Fax #    Swapna Rosas [phone]       [facility] OB GYN CONSULT [address] [facility] [address]  [facility] [address]        Thank you!     Thank you for choosing [facility]  for your care. Our goal is always to provide you with excellent care. Hearing back from our patients is one way we can continue to improve our services. Please take a few minutes to complete the written survey that you may receive in the mail after your visit with us. Thank you!             Your Updated Medication List - Protect others around you: Learn how to safely use, store and throw away your medicines at www.disposemymeds.org.          This list is accurate as of: 2/23/17  2:06 PM.  Always use your most recent med list.                   Brand Name Dispense Instructions for use    * amoxicillin-clavulanate 875-125 MG per tablet    AUGMENTIN    28 tablet    Take 1 tablet by mouth 2 times daily       * amoxicillin-clavulanate 875-125 MG per tablet    AUGMENTIN    14 tablet    Take 1 tablet by mouth 2 times daily for 7 days       CURITY COTTON TIPPED APPLICATR Misc     20 each    1 Package daily       * CURITY PLAIN PACKING STRIP Misc     1 each    Pack both incisions with packing strip once daily. Cover with 4x4\" gauze pads and tape.       * Gauze Dressing 4\"X4\" Pads     24 each    Use gauze pads to cover incisions after packing with packing strips.       docusate sodium 100 MG tablet    COLACE    60 tablet    Take 100 mg by mouth 2 times daily       doxycycline Monohydrate 100 MG Caps     28 capsule    Take 1 capsule (100 mg) by mouth 2 times daily       * HYDROcodone-acetaminophen 5-325 MG per tablet    NORCO    20 tablet    Take 1-2 tablets by mouth every 6 hours as needed for " moderate to severe pain       * HYDROcodone-acetaminophen 5-325 MG per tablet    NORCO    20 tablet    Take 1-2 tablets by mouth every 4 hours as needed for moderate to severe pain       * HYDROcodone-acetaminophen 5-325 MG per tablet    NORCO    20 tablet    Take 1-2 tablets by mouth every 4 hours as needed for moderate to severe pain       MEDIPORE SOFT CLOTH TAPE Tape     1 each    Tape over gauze pads once daily to keep gauze and packing in place and prevent drainage from leaking.       NONFORMULARY      Supplements for migraines       triamcinolone 0.5 % cream    KENALOG     Apply topically 2 times daily       * Notice:  This list has 7 medication(s) that are the same as other medications prescribed for you. Read the directions carefully, and ask your doctor or other care provider to review them with you.

## 2017-02-23 NOTE — PROGRESS NOTES
Surgery Postoperative Note    S: Ms. Enamorado returns today for recheck of her left breast abscess. She was able to attend her sister's wedding last week in [place] uneventfully. She feels her left breast overall is improving. She did have a new area which drained purulent fluid erupt yesterday. She has been applying warm compresses to her breast. She has one dose left of augmentin. She denies fevers. She has not required any pain medications. She is no longer packing the open wounds from the I&D.     Left Breast: prior I&D wounds healing well, inferior site healed, new 2mm opening just medial to the I&D site with small amount of drainage. With palpation to the superior I&D wound there was also a small amount of purulent drainage expressed. There is no underlying palpable fluctuance. Erythema improving.     A/P  Shannen Enamorado is recovering from a excisional biopsy of her left breast and Incision and drainage of left breast abscess. She continues to have ongoing drainage from the breast but overall is improving. There is nothing to perform I&D on as there is no palpable fluctuance. I do think she will need a follow up ultrasound in a couple weeks as things hopefully continue to improve. As she has been improving on augmentin - we will continue this for one more week. 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 If she continues to have ongoing disease, it may be reasonable to try steroid treatment empirically. We again discussed malignancy as well and with negative biopsies the risk is low. Follow up imaging will be helpful. I will see her again next week.       Thank you for the opportunity to help in her care.    Roxi Mcdonnell  [facility]  [phone]    Please route or send letter to:  Primary Care Provider (PCP) and Referring Provider

## 2017-03-03 ENCOUNTER — OFFICE VISIT (OUTPATIENT)
Dept: SURGERY | Facility: CLINIC | Age: 37
End: 2017-03-03
Payer: COMMERCIAL

## 2017-03-03 DIAGNOSIS — Z09 SURGERY FOLLOW-UP: Primary | ICD-10-CM

## 2017-03-03 PROCEDURE — 99024 POSTOP FOLLOW-UP VISIT: CPT | Performed by: SURGERY

## 2017-03-03 NOTE — PROGRESS NOTES
Shannen returns today for evaluation of left breast abscess. She has had an area superior to the prior surgical I&D site which was red and she drained this at home on Monday. Since then, pain has improved. She continues to have some purulent drainage from the surgical I&D site. The overall erythema continues to slowly improve. She finished the course of augmentin yesterday.     O: LMP 12/23/2016  Left breast: ongoing induration superiorly, improved induration medially. Three open areas, inferior spot with small amount of purulent drainage. Just lateral to areola - surgical I&D location with purulent drainage expressible with lateral pressure on breast. Superior open area without drainage.     A/P: 36yof with left breast abscess/cellulitis. Overall slight improvement since last week. It is still concerning that there continues to be purulent drainage. We discussed options including ongoing observation off abx, repeat ultrasound to evaluate for deeper abscess vs surgery to further open the area with purulence. At this time she would like to continue with observation and warm compresses. She does not want any further US guided aspirations. She will follow up next week for evaluation off abx. We did discuss possible treatment with steroids if there continues to be slow progression.     Roxi Mcdonnell MD  [facility]  [phone]

## 2017-03-03 NOTE — MR AVS SNAPSHOT
"              After Visit Summary   3/3/2017    Shannen Enamorado    MRN: [id number]           Patient Information     Date Of Birth          1980        Visit Information        Provider Department      3/3/2017 9:45 AM Roxi Mcdonnell MD [facility] General Surgery      Today's Diagnoses     Surgery follow-up    -  1       Follow-ups after your visit        Who to contact     If you have questions or need follow up information about today's clinic visit or your schedule please contact [facility] directly at [phone].  Normal or non-critical lab and imaging results will be communicated to you by Millennium Entertainmenthart, letter or phone within 4 business days after the clinic has received the results. If you do not hear from us within 7 days, please contact the clinic through ShareWithUt or phone. If you have a critical or abnormal lab result, we will notify you by phone as soon as possible.  Submit refill requests through Sift or call your pharmacy and they will forward the refill request to us. Please allow 3 business days for your refill to be completed.          Additional Information About Your Visit        MyChart Information     Sift lets you send messages to your doctor, view your test results, renew your prescriptions, schedule appointments and more. To sign up, go to [email or web address] . Click on \"Log in\" on the left side of the screen, which will take you to the Welcome page. Then click on \"Sign up Now\" on the right side of the page.     You will be asked to enter the access code listed below, as well as some personal information. Please follow the directions to create your username and password.     Your access code is: [email or web address]  Expires: 2017  8:54 AM     Your access code will  in 90 days. If you need help or a new code, please call your [place] clinic or [phone].        Care EveryWhere ID     This is your Care " "EveryWhere ID. This could be used by other organizations to access your [place] medical records  [id number]        Your Vitals Were     Last Period                   12/23/2016            Blood Pressure from Last 3 Encounters:   02/23/17 (!) 153/110   02/09/17 125/80   02/03/17 112/71    Weight from Last 3 Encounters:   02/23/17 170 lb (77.1 kg)   02/09/17 175 lb (79.4 kg)   02/07/17 174 lb (78.9 kg)              Today, you had the following     No orders found for display       Primary Care Provider Office Phone # Fax #    Swapna Rosas [phone]       [facility]YN CONSULT [address] [facility] [address]  [facility] [address]        Thank you!     Thank you for choosing [facility]  for your care. Our goal is always to provide you with excellent care. Hearing back from our patients is one way we can continue to improve our services. Please take a few minutes to complete the written survey that you may receive in the mail after your visit with us. Thank you!             Your Updated Medication List - Protect others around you: Learn how to safely use, store and throw away your medicines at www.disposemymeds.org.          This list is accurate as of: 3/3/17 10:12 AM.  Always use your most recent med list.                   Brand Name Dispense Instructions for use    amoxicillin-clavulanate 875-125 MG per tablet    AUGMENTIN    28 tablet    Take 1 tablet by mouth 2 times daily       CURITY COTTON TIPPED APPLICATR Misc     20 each    1 Package daily       * CURITY PLAIN PACKING STRIP Misc     1 each    Pack both incisions with packing strip once daily. Cover with 4x4\" gauze pads and tape.       * Gauze Dressing 4\"X4\" Pads     24 each    Use gauze pads to cover incisions after packing with packing strips.       docusate sodium 100 MG tablet    COLACE    60 tablet    Take 100 mg by mouth 2 times daily       doxycycline Monohydrate 100 MG Caps     28 capsule    Take 1 capsule (100 mg) " by mouth 2 times daily       * HYDROcodone-acetaminophen 5-325 MG per tablet    NORCO    20 tablet    Take 1-2 tablets by mouth every 6 hours as needed for moderate to severe pain       * HYDROcodone-acetaminophen 5-325 MG per tablet    NORCO    20 tablet    Take 1-2 tablets by mouth every 4 hours as needed for moderate to severe pain       * HYDROcodone-acetaminophen 5-325 MG per tablet    NORCO    20 tablet    Take 1-2 tablets by mouth every 4 hours as needed for moderate to severe pain       MEDIPORE SOFT CLOTH TAPE Tape     1 each    Tape over gauze pads once daily to keep gauze and packing in place and prevent drainage from leaking.       NONFORMULARY      Supplements for migraines       triamcinolone 0.5 % cream    KENALOG     Apply topically 2 times daily       * Notice:  This list has 5 medication(s) that are the same as other medications prescribed for you. Read the directions carefully, and ask your doctor or other care provider to review them with you.

## 2017-03-10 ENCOUNTER — OFFICE VISIT (OUTPATIENT)
Dept: SURGERY | Facility: CLINIC | Age: 37
End: 2017-03-10
Payer: COMMERCIAL

## 2017-03-10 DIAGNOSIS — Z09 SURGERY FOLLOW-UP: Primary | ICD-10-CM

## 2017-03-10 PROCEDURE — 88312 SPECIAL STAINS GROUP 1: CPT | Performed by: SURGERY

## 2017-03-10 PROCEDURE — 11100 ZZHC BIOPSY SKIN/SUBQ/MUC MEM, SINGLE LESION: CPT | Mod: 78 | Performed by: SURGERY

## 2017-03-10 PROCEDURE — 88342 IMHCHEM/IMCYTCHM 1ST ANTB: CPT | Performed by: SURGERY

## 2017-03-10 PROCEDURE — 88305 TISSUE EXAM BY PATHOLOGIST: CPT | Performed by: SURGERY

## 2017-03-10 NOTE — LETTER
March 10, 2017    Surgery Clinic Follow Up      RE:  Shannen Enamorado-:  [birth year]     Shannen returns today for recheck of her left breast abscesses. She reports the pain is improving but there is a new area on her upper outer breast which is now firm. She has been doing hot compresses a couple times per day. The drainage is improving and now clear. She has been off abx for about 1 week. No fevers.      O:   Left Breast: erythema stable since last week with no purulence expressed on exam today. The prior excisional biopsy sites are healing. There is an area of fluctuance just superomedial to the areola. There is increased induration on the superior and lateral breast without underlying fluctuance.      Procedure: punch biopsy performed after 1% lidocaine injected to the superomedial aspect of most inflammation and underlying fluctance. A 3mm punch was used to perform biopsy. No fluid returned after biopsy. The specimen was placed in a specimen cup for routine evaluation. Hemostasis was achieved with silver nitrate.      A/P: A 36-year-old female with persistent left breast infection who has had a surgical I&D as well as multiple attempts at ultrasound-guided aspiration for abscesses in her left breast. She has had excisional biopsy and this was negative for inflammatory breast cancer as well as granulomatous mastitis. Today her breast appears more inflamed than previously. We had a discussion about options. I recommended a repeat biopsy to again evaluate for granulomatous changes and inflammatory breast cancer to ensure we are not missing something. She was in agreement and this was done today. I will call her with the results and ongoing plan.      Roxi Mcdonnell MD  [facility]  [phone]

## 2017-03-10 NOTE — MR AVS SNAPSHOT
"              After Visit Summary   3/10/2017    Shannen Enamorado    MRN: [id number]           Patient Information     Date Of Birth          1980        Visit Information        Provider Department      3/10/2017 4:15 PM Roxi Mcdonnell MD [facility] General Surgery      Today's Diagnoses     Surgery follow-up    -  1       Follow-ups after your visit        Who to contact     If you have questions or need follow up information about today's clinic visit or your schedule please contact [facility] directly at [phone].  Normal or non-critical lab and imaging results will be communicated to you by vLinehart, letter or phone within 4 business days after the clinic has received the results. If you do not hear from us within 7 days, please contact the clinic through [employer] or phone. If you have a critical or abnormal lab result, we will notify you by phone as soon as possible.  Submit refill requests through [employer] or call your pharmacy and they will forward the refill request to us. Please allow 3 business days for your refill to be completed.          Additional Information About Your Visit        MyChart Information     [employer] lets you send messages to your doctor, view your test results, renew your prescriptions, schedule appointments and more. To sign up, go to [email or web address] . Click on \"Log in\" on the left side of the screen, which will take you to the Welcome page. Then click on \"Sign up Now\" on the right side of the page.     You will be asked to enter the access code listed below, as well as some personal information. Please follow the directions to create your username and password.     Your access code is: [email or web address]  Expires: 2017  8:54 AM     Your access code will  in 90 days. If you need help or a new code, please call your [place] clinic or [phone].        Care EveryWhere ID     This is your Care " "EveryWhere ID. This could be used by other organizations to access your [place] medical records  [id number]         Blood Pressure from Last 3 Encounters:   02/23/17 (!) 153/110   02/09/17 125/80   02/03/17 112/71    Weight from Last 3 Encounters:   02/23/17 170 lb (77.1 kg)   02/09/17 175 lb (79.4 kg)   02/07/17 174 lb (78.9 kg)              We Performed the Following     Surgical pathology exam        Primary Care Provider Office Phone # Fax #    Swapna Rosas [phone]       [facility] OB GYN CONSULT [address]  [facility] [address]        Thank you!     Thank you for choosing [facility]  for your care. Our goal is always to provide you with excellent care. Hearing back from our patients is one way we can continue to improve our services. Please take a few minutes to complete the written survey that you may receive in the mail after your visit with us. Thank you!             Your Updated Medication List - Protect others around you: Learn how to safely use, store and throw away your medicines at www.disposemymeds.org.          This list is accurate as of: 3/10/17  4:47 PM.  Always use your most recent med list.                   Brand Name Dispense Instructions for use    amoxicillin-clavulanate 875-125 MG per tablet    AUGMENTIN    28 tablet    Take 1 tablet by mouth 2 times daily       CURITY COTTON TIPPED APPLICATR Misc     20 each    1 Package daily       * CURITY PLAIN PACKING STRIP Misc     1 each    Pack both incisions with packing strip once daily. Cover with 4x4\" gauze pads and tape.       * Gauze Dressing 4\"X4\" Pads     24 each    Use gauze pads to cover incisions after packing with packing strips.       docusate sodium 100 MG tablet    COLACE    60 tablet    Take 100 mg by mouth 2 times daily       doxycycline Monohydrate 100 MG Caps     28 capsule    Take 1 capsule (100 mg) by mouth 2 times daily       * HYDROcodone-acetaminophen 5-325 MG per tablet "    NORCO    20 tablet    Take 1-2 tablets by mouth every 6 hours as needed for moderate to severe pain       * HYDROcodone-acetaminophen 5-325 MG per tablet    NORCO    20 tablet    Take 1-2 tablets by mouth every 4 hours as needed for moderate to severe pain       * HYDROcodone-acetaminophen 5-325 MG per tablet    NORCO    20 tablet    Take 1-2 tablets by mouth every 4 hours as needed for moderate to severe pain       MEDIPORE SOFT CLOTH TAPE Tape     1 each    Tape over gauze pads once daily to keep gauze and packing in place and prevent drainage from leaking.       NONFORMULARY      Supplements for migraines       triamcinolone 0.5 % cream    KENALOG     Apply topically 2 times daily       * Notice:  This list has 5 medication(s) that are the same as other medications prescribed for you. Read the directions carefully, and ask your doctor or other care provider to review them with you.

## 2017-03-10 NOTE — PROGRESS NOTES
Surgery Clinic Follow Up    Shannen returns today for recheck of her left breast abscesses.  She reports the pain is improving but there is a new area on her upper outer breast which is now firm. She has been doing hot compresses a couple times per day. The drainage is improving and now clear. She has been off abx for about 1 week. No fevers.     O:   Left Breast: erythema stable since last week with no purulence expressed on exam today. The prior excisional biopsy sites are healing. There is an area of fluctuance just superomedial to the areola. There is increased induration on the superior and lateral breast without underlying fluctuance.     Procedure: punch biopsy performed after 1% lidocaine injected to the superomedial aspect of most inflammation and underlying fluctance. A 3mm punch was used to perform biopsy. No fluid returned after biopsy. The specimen was placed in a specimen cup for routine evaluation. Hemostasis was achieved with silver nitrate.     A/P: A 36-year-old female with persistent left breast infection who has had a surgical I&D as well as multiple attempts at ultrasound-guided aspiration for abscesses in her left breast.  She has had excisional biopsy and this was negative for inflammatory breast cancer as well as granulomatous mastitis.  Today her breast appears more inflamed than previously. We had a discussion about options. I recommended a repeat biopsy to again evaluate for granulomatous changes and inflammatory breast cancer to ensure we are not missing something. She was in agreement and this was done today. I will call her with the results and ongoing plan.     Roxi Mcdonnell MD  [facility]  [phone]

## 2017-03-13 ENCOUNTER — TELEPHONE (OUTPATIENT)
Dept: SURGERY | Facility: CLINIC | Age: 37
End: 2017-03-13

## 2017-03-13 DIAGNOSIS — R25.2 CRAMP OF LIMB: Primary | ICD-10-CM

## 2017-03-13 NOTE — TELEPHONE ENCOUNTER
Name of caller: Patient    Reason for Call:  Cramping from the waist down, swollen ankles-pcp that Dr. Mcdonnell suggested isn't taking new patients.  Does she know anyone else    Surgeon:  Dr. Mcdonnell    Recent Surgery:  Yes.    If yes, when & what type: 3/10/2017 and 2/3/2017 breast biopsy      Best phone number to reach pt at is: [phone]-please call after 11:30  Ok to leave a message with medical info? Yes.

## 2017-03-13 NOTE — TELEPHONE ENCOUNTER
Ms. Enamorado called as her leg cramping and bilateral lower leg edema  is worse through the weekend. I recommend she see a primary care provider for ongoing workup. She will try to make an appt with Dr. Garcia who she has seen previously.     We discussed her left breast and she reports it is actually feeling a bit better. She has not had further drainage. She was wondering if this could be a possible fungal infection. This is rare, but possible. I will try to see if pathology is able to add on fungal cultures to the tissue from Friday otherwise if it begins draining again she will call and get into clinic to obtain cultures. We also discussed that in rare occasions breast abscesses can be related to tuberculosis infections. She has not had a recent TB test. This would also be reasonable to check. I am awaiting the repeat biopsy from Friday. I will call her with the results when they are available.     Roxi Mcdonnell MD  [facility]  [phone]

## 2017-03-13 NOTE — TELEPHONE ENCOUNTER
Patient would like to discuss with Dr. Davila. Message sent to Dr. Davila to call patient.    Christina Guardado RN BSN

## 2017-03-13 NOTE — TELEPHONE ENCOUNTER
----- Message from Christina Guardado RN sent at 3/13/2017  9:37 AM CDT -----  Regarding: Swelling and cramping  Contact: [phone]  Patient is having cramping from the waist down, swollen ankles-pcp that you suggested isn't taking new patients, She is wondering if you can call her today after 11:30am. Call Back is [phone].    Thank you!  -Christina

## 2017-03-15 LAB — COPATH REPORT: NORMAL

## 2017-03-17 ENCOUNTER — TELEPHONE (OUTPATIENT)
Dept: SURGERY | Facility: CLINIC | Age: 37
End: 2017-03-17

## 2017-03-17 NOTE — TELEPHONE ENCOUNTER
I called Shannen to discuss her pathology from the punch biopsy done last week. It does not show any evidence of malignancy. Fungal stains are negative. They were unfortunately unable to assess for granulomatous mastitis as there was not adequate breast tissue in the specimen.     She continues to have bilateral lower leg pain and cramping. She has an appt with Dr. Garcia to discuss this on Monday.     As far as her breast symptoms, there has not been improvement.  We again discussed that ruling out TB should be done prior to a trial of prednisone. She will ask Dr. Garcia to order on Monday or I can order for her as well. She is concerned about checking her thyroid function and electrolytes which could also be done Monday.     Roxi Mcdonnell MD  [facility]  [phone]

## 2017-03-20 ENCOUNTER — OFFICE VISIT (OUTPATIENT)
Dept: INTERNAL MEDICINE | Facility: CLINIC | Age: 37
End: 2017-03-20
Payer: COMMERCIAL

## 2017-03-20 ENCOUNTER — TELEPHONE (OUTPATIENT)
Dept: SURGERY | Facility: CLINIC | Age: 37
End: 2017-03-20

## 2017-03-20 VITALS
WEIGHT: 175.9 LBS | SYSTOLIC BLOOD PRESSURE: 130 MMHG | DIASTOLIC BLOOD PRESSURE: 98 MMHG | HEART RATE: 95 BPM | HEIGHT: 69 IN | OXYGEN SATURATION: 100 % | BODY MASS INDEX: 26.05 KG/M2 | TEMPERATURE: 98.2 F

## 2017-03-20 DIAGNOSIS — D72.829 LEUKOCYTOSIS, UNSPECIFIED TYPE: Primary | ICD-10-CM

## 2017-03-20 DIAGNOSIS — M70.61 TROCHANTERIC BURSITIS OF BOTH HIPS: ICD-10-CM

## 2017-03-20 DIAGNOSIS — M76.61 ACHILLES TENDINITIS OF RIGHT LOWER EXTREMITY: ICD-10-CM

## 2017-03-20 DIAGNOSIS — M25.471 SWELLING OF BOTH ANKLES: ICD-10-CM

## 2017-03-20 DIAGNOSIS — F33.1 MODERATE EPISODE OF RECURRENT MAJOR DEPRESSIVE DISORDER (H): ICD-10-CM

## 2017-03-20 DIAGNOSIS — M25.472 SWELLING OF BOTH ANKLES: ICD-10-CM

## 2017-03-20 DIAGNOSIS — M70.62 TROCHANTERIC BURSITIS OF BOTH HIPS: ICD-10-CM

## 2017-03-20 DIAGNOSIS — M79.604 LEG PAIN, BILATERAL: Primary | ICD-10-CM

## 2017-03-20 DIAGNOSIS — N91.5 OLIGOMENORRHEA: ICD-10-CM

## 2017-03-20 DIAGNOSIS — M72.2 PLANTAR FASCIITIS: ICD-10-CM

## 2017-03-20 DIAGNOSIS — Z13.29 SCREENING FOR THYROID DISORDER: ICD-10-CM

## 2017-03-20 DIAGNOSIS — M79.605 LEG PAIN, BILATERAL: Primary | ICD-10-CM

## 2017-03-20 DIAGNOSIS — Z11.1 SCREENING FOR TUBERCULOSIS: ICD-10-CM

## 2017-03-20 DIAGNOSIS — M70.50 PES ANSERINE BURSITIS: ICD-10-CM

## 2017-03-20 LAB
ALBUMIN SERPL-MCNC: 3.6 G/DL (ref 3.4–5)
ALP SERPL-CCNC: 103 U/L (ref 40–150)
ALT SERPL W P-5'-P-CCNC: 21 U/L (ref 0–50)
ANION GAP SERPL CALCULATED.3IONS-SCNC: 6 MMOL/L (ref 3–14)
AST SERPL W P-5'-P-CCNC: 8 U/L (ref 0–45)
BILIRUB SERPL-MCNC: 0.2 MG/DL (ref 0.2–1.3)
BUN SERPL-MCNC: 10 MG/DL (ref 7–30)
CALCIUM SERPL-MCNC: 9.3 MG/DL (ref 8.5–10.1)
CHLORIDE SERPL-SCNC: 104 MMOL/L (ref 94–109)
CO2 SERPL-SCNC: 30 MMOL/L (ref 20–32)
CREAT SERPL-MCNC: 0.68 MG/DL (ref 0.52–1.04)
DEPRECATED CALCIDIOL+CALCIFEROL SERPL-MC: 17 UG/L (ref 20–75)
DIFFERENTIAL METHOD BLD: ABNORMAL
EOSINOPHIL # BLD AUTO: 0.1 10E9/L (ref 0–0.7)
EOSINOPHIL NFR BLD AUTO: 1 %
ERYTHROCYTE [DISTWIDTH] IN BLOOD BY AUTOMATED COUNT: 12.2 % (ref 10–15)
FSH SERPL-ACNC: 6 IU/L
GFR SERPL CREATININE-BSD FRML MDRD: NORMAL ML/MIN/1.7M2
GLUCOSE SERPL-MCNC: 89 MG/DL (ref 70–99)
HCT VFR BLD AUTO: 38.2 % (ref 35–47)
HGB BLD-MCNC: 12 G/DL (ref 11.7–15.7)
LYMPHOCYTES # BLD AUTO: 2.2 10E9/L (ref 0.8–5.3)
LYMPHOCYTES NFR BLD AUTO: 17 %
MAGNESIUM SERPL-MCNC: 2.1 MG/DL (ref 1.6–2.3)
MCH RBC QN AUTO: 29.6 PG (ref 26.5–33)
MCHC RBC AUTO-ENTMCNC: 31.4 G/DL (ref 31.5–36.5)
MCV RBC AUTO: 94 FL (ref 78–100)
MONOCYTES # BLD AUTO: 1.2 10E9/L (ref 0–1.3)
MONOCYTES NFR BLD AUTO: 9 %
NEUTROPHILS # BLD AUTO: 9.4 10E9/L (ref 1.6–8.3)
NEUTROPHILS NFR BLD AUTO: 73 %
PHOSPHATE SERPL-MCNC: 2.7 MG/DL (ref 2.5–4.5)
PLATELET # BLD AUTO: 436 10E9/L (ref 150–450)
POTASSIUM SERPL-SCNC: 4.3 MMOL/L (ref 3.4–5.3)
PROLACTIN SERPL-MCNC: 8 UG/L (ref 3–27)
PROT SERPL-MCNC: 7.8 G/DL (ref 6.8–8.8)
RBC # BLD AUTO: 4.06 10E12/L (ref 3.8–5.2)
SODIUM SERPL-SCNC: 140 MMOL/L (ref 133–144)
TSH SERPL DL<=0.005 MIU/L-ACNC: 1.57 MU/L (ref 0.4–4)
WBC # BLD AUTO: 12.9 10E9/L (ref 4–11)

## 2017-03-20 PROCEDURE — 84146 ASSAY OF PROLACTIN: CPT | Performed by: INTERNAL MEDICINE

## 2017-03-20 PROCEDURE — 83001 ASSAY OF GONADOTROPIN (FSH): CPT | Performed by: INTERNAL MEDICINE

## 2017-03-20 PROCEDURE — 83735 ASSAY OF MAGNESIUM: CPT | Performed by: INTERNAL MEDICINE

## 2017-03-20 PROCEDURE — 99215 OFFICE O/P EST HI 40 MIN: CPT | Performed by: INTERNAL MEDICINE

## 2017-03-20 PROCEDURE — 86580 TB INTRADERMAL TEST: CPT | Performed by: INTERNAL MEDICINE

## 2017-03-20 PROCEDURE — 36415 COLL VENOUS BLD VENIPUNCTURE: CPT | Performed by: INTERNAL MEDICINE

## 2017-03-20 PROCEDURE — 80050 GENERAL HEALTH PANEL: CPT | Performed by: INTERNAL MEDICINE

## 2017-03-20 PROCEDURE — 84100 ASSAY OF PHOSPHORUS: CPT | Performed by: INTERNAL MEDICINE

## 2017-03-20 PROCEDURE — 82306 VITAMIN D 25 HYDROXY: CPT | Performed by: INTERNAL MEDICINE

## 2017-03-20 NOTE — TELEPHONE ENCOUNTER
Name of caller: Patient    Reason for Call:  Pt has questions      Surgeon:  Dr. Mcdonnell    Recent Surgery:  NA    If yes, when & what type:  NA       Best phone number to reach pt at is: [phone]  Ok to leave a message with medical info? Yes.    Pharmacy preferred (if calling for a refill): NA

## 2017-03-20 NOTE — NURSING NOTE
"Chief Complaint   Patient presents with     Establish Care     Musculoskeletal Problem     x 1.5 months. Bilateral Leg pain mainly in the calf. Disd a lot of Hiking recently.      Blood panel.     Would like to get tested for thyroid and autoimmune disorders, TB testing       Initial BP (!) 130/98 (BP Location: Left arm, Patient Position: Chair, Cuff Size: Adult Regular)  Pulse 95  Temp 98.2  F (36.8  C) (Oral)  Ht 5' 9\" (1.753 m)  Wt 175 lb 14.4 oz (79.8 kg)  LMP 03/15/2017 (Exact Date)  SpO2 100%  Breastfeeding? No  BMI 25.98 kg/m2 Estimated body mass index is 25.98 kg/(m^2) as calculated from the following:    Height as of this encounter: 5' 9\" (1.753 m).    Weight as of this encounter: 175 lb 14.4 oz (79.8 kg).  Medication Reconciliation: complete     Solitario GAY      "

## 2017-03-20 NOTE — MR AVS SNAPSHOT
After Visit Summary   3/20/2017    Shannen Enamorado    MRN: [id number]           Patient Information     Date Of Birth          1980        Visit Information        Provider Department      3/20/2017 10:00 AM Josselin Garcia MD [facility]        Today's Diagnoses     Screening for tuberculosis    -  1    Screening for thyroid disorder        Leg pain, bilateral        Period disorder        Moderate episode of recurrent major depressive disorder (H)          Care Instructions    Hip pain is consistent with trochanteric bursitis.     ---    Lower, inner leg pain is consistent with pes anserine bursitis. Best treatment is avoiding repetitive knee movements.    Cool compresses and ibuprofen as needed.    ---    Heel/bottom of foot pain is consistent with plantar fasciitis. Best treatments:    - wearing supportive at all times, even at home  - wearing heel insert in shoes  - rolling foot over tennis ball (stretches fascia)  - cool compresses and ibuprofen as need (2 tabs every 6-8 hours)    ---    Period are likely changing due to mental and physical stressors - this is a normal response, but we will check labs to make sure.     ---    Ankle swelling - mild - may be due to above injuries and/or inactivity with respect to your baseline.    ---    PPD today - come back for nursing visit 48-72 hours for read.     ---    Labs - please proceed to our first floor laboratory to have these drawn (show them your orange ticket).     Results:    If normal: we will release results in [employer] or send them in the mail. You will not be called for these results.    If abnormal, but non-urgent: we will release results in [employer] or send them in the mail. You will not be called for these results.    If abnormal and urgent: we will call you.    ---    START Zoloft: 1/2 tab for the first week, then full tab after that.    Follow-up with me in ~6 weeks (earlier as needed).                     "   Follow-ups after your visit        Who to contact     If you have questions or need follow up information about today's clinic visit or your schedule please contact [facility] directly at [phone].  Normal or non-critical lab and imaging results will be communicated to you by MyChart, letter or phone within 4 business days after the clinic has received the results. If you do not hear from us within 7 days, please contact the clinic through MyChart or phone. If you have a critical or abnormal lab result, we will notify you by phone as soon as possible.  Submit refill requests through [employer] or call your pharmacy and they will forward the refill request to us. Please allow 3 business days for your refill to be completed.          Additional Information About Your Visit        [employer] Education Information     [employer] lets you send messages to your doctor, view your test results, renew your prescriptions, schedule appointments and more. To sign up, go to [email or web address] . Click on \"Log in\" on the left side of the screen, which will take you to the Welcome page. Then click on \"Sign up Now\" on the right side of the page.     You will be asked to enter the access code listed below, as well as some personal information. Please follow the directions to create your username and password.     Your access code is: [email or web address]  Expires: 2017  9:54 AM     Your access code will  in 90 days. If you need help or a new code, please call your [facility] clinic or [phone].        Care EveryWhere ID     This is your Care EveryWhere ID. This could be used by other organizations to access your [facility] medical records  [id number]        Your Vitals Were     Pulse Temperature Height Last Period Pulse Oximetry Breastfeeding?    95 98.2  F (36.8  C) (Oral) 5' 9\" (1.753 m) 03/15/2017 (Exact Date) 100% No    BMI (Body Mass Index)                   25.98 kg/m2            Blood Pressure from Last " 3 Encounters:   03/20/17 (!) 130/98   02/23/17 (!) 153/110   02/09/17 125/80    Weight from Last 3 Encounters:   03/20/17 175 lb 14.4 oz (79.8 kg)   02/23/17 170 lb (77.1 kg)   02/09/17 175 lb (79.4 kg)              We Performed the Following     CBC with platelets     Comprehensive metabolic panel     Follicle stimulating hormone     Magnesium     Phosphorus     Prolactin     TB INTRADERMAL TEST     TSH with free T4 reflex     Vitamin D Deficiency          Today's Medication Changes          These changes are accurate as of: 3/20/17 10:45 AM.  If you have any questions, ask your nurse or doctor.               Start taking these medicines.        Dose/Directions    sertraline 50 MG tablet   Commonly known as:  ZOLOFT   Used for:  Moderate episode of recurrent major depressive disorder (H)   Started by:  Josselin Garcia MD        Dose:  50 mg   Take 1 tablet (50 mg) by mouth daily   Quantity:  30 tablet   Refills:  1         These medicines have changed or have updated prescriptions.        Dose/Directions    HYDROcodone-acetaminophen 5-325 MG per tablet   Commonly known as:  NORCO   This may have changed:  Another medication with the same name was removed. Continue taking this medication, and follow the directions you see here.   Used for:  Surgery follow-up   Changed by:  Roxi Mcdonnell MD        Dose:  1-2 tablet   Take 1-2 tablets by mouth every 4 hours as needed for moderate to severe pain   Quantity:  20 tablet   Refills:  0            Where to get your medicines      These medications were sent to [facility]/pharmacy #[id number] [place], [place] - [address]  [address] [address]     Phone:  [phone]     sertraline 50 MG tablet                Primary Care Provider Office Phone # Fax #    Swapna Rosas [phone]       [facility] [address] [address]        Thank you!     Thank you for choosing [facility]  "[facility]  for your care. Our goal is always to provide you with excellent care. Hearing back from our patients is one way we can continue to improve our services. Please take a few minutes to complete the written survey that you may receive in the mail after your visit with us. Thank you!             Your Updated Medication List - Protect others around you: Learn how to safely use, store and throw away your medicines at www.disposemymeds.org.          This list is accurate as of: 3/20/17 10:45 AM.  Always use your most recent med list.                   Brand Name Dispense Instructions for use    * CURITY PLAIN PACKING STRIP Misc     1 each    Pack both incisions with packing strip once daily. Cover with 4x4\" gauze pads and tape.       * Gauze Dressing 4\"X4\" Pads     24 each    Use gauze pads to cover incisions after packing with packing strips.       HYDROcodone-acetaminophen 5-325 MG per tablet    NORCO    20 tablet    Take 1-2 tablets by mouth every 4 hours as needed for moderate to severe pain       MEDIPORE SOFT CLOTH TAPE Tape     1 each    Tape over gauze pads once daily to keep gauze and packing in place and prevent drainage from leaking.       NONFORMULARY      Supplements for migraines       sertraline 50 MG tablet    ZOLOFT    30 tablet    Take 1 tablet (50 mg) by mouth daily       triamcinolone 0.5 % cream    KENALOG     Apply topically 2 times daily       * Notice:  This list has 2 medication(s) that are the same as other medications prescribed for you. Read the directions carefully, and ask your doctor or other care provider to review them with you.      "

## 2017-03-20 NOTE — TELEPHONE ENCOUNTER
Called patient back, she was requested to call Dr. Mcdonnell after her appt. Today with Dr. Garcia. Patient is aware Dr. Mcdonnell is not in clinic today. Told patient I would let Dr. Mcdonnell know to call her in the AM tomoroow she will wait for call back tomorrow from Dr. Mcdonnell. Patient verbalized understanding of this and agreed. Encouraged them to call back if they had further questions or concerns.    Christina Guardado RN BSN

## 2017-03-20 NOTE — PATIENT INSTRUCTIONS
Hip pain is consistent with trochanteric bursitis.     ---    Lower, inner leg pain is consistent with pes anserine bursitis. Best treatment is avoiding repetitive knee movements.    Cool compresses and ibuprofen as needed.    ---    Heel/bottom of foot pain is consistent with plantar fasciitis. Best treatments:    - wearing supportive at all times, even at home  - wearing heel insert in shoes  - rolling foot over tennis ball (stretches fascia)  - cool compresses and ibuprofen as need (2 tabs every 6-8 hours)    ---    Period are likely changing due to mental and physical stressors - this is a normal response, but we will check labs to make sure.     ---    Ankle swelling - mild - may be due to above injuries and/or inactivity with respect to your baseline.    ---    PPD today - come back for nursing visit 48-72 hours for read.     ---    Labs - please proceed to our first floor laboratory to have these drawn (show them your orange ticket).     Results:    If normal: we will release results in [employer] or send them in the mail. You will not be called for these results.    If abnormal, but non-urgent: we will release results in [employer] or send them in the mail. You will not be called for these results.    If abnormal and urgent: we will call you.    ---    START Zoloft: 1/2 tab for the first week, then full tab after that.    Follow-up with me in ~6 weeks (earlier as needed).

## 2017-03-20 NOTE — PROGRESS NOTES
SUBJECTIVE:                                                      HPI: Shannen Enamorado is a pleasant 36 year old female who presents with multiple concerns:    Last saw patient in January for left sided mastitis.     Breast mass palpated at that time and patient referred to breast clinic for further evaluation.    Left sided mastitis and breast mass progressed to left-sided breast abscess which was surgically drained in early February.    In spite of treatment with antibiotics patient has developed a new area of concern in her left upper outer breasts. It is unclear whether this represents a continued our new infection or granulomatous mastitis.    She is being followed closely by Dr. Mcdonnell for this issue.    Her concerns today are as follows:    1. Bilateral leg pain:  - ongoing since surgery in early February  - significantly worsened after recent trip to [place]   - Went to a wedding, danced a lot in heels   - Went on a difficult hike the next day, admittedly did not wear appropriate shoes   - Was frequently dehydrated   - Suspects she was suffering from altitude sickness much of the time (felt ill, dizzy, and tired most of the trip)  - multiple areas involved:   - Bilateral, lateral hips    - Pain with squatting and standing up again    - This has improved over time   - Bilateral, inner, upper, lower legs    - Pain with squatting and standing again    - Pain with pain with walking    - This has improved somewhat over time   - Bilateral bottom of feet near heel    - Worse when walking barefoot, especially first thing in the morning    - Frequently wears flat - normal shoewear not particularly supportive   - Right Achilles tendon    - Pain with walking  - pain improves with ibuprofen use  - associated ankle swelling    2. Change in periods:  - patient reports that the length between her periods is increasing  - has been approximately 6 weeks between periods for the last several periods  - periods  "themselves are the same length and heaviness  - not pregnant - last period was last week    3. Worsening mood:  - past medical history significant for depression, treated briefly with Prozac  - patient reports that her mood has been worsening over the last several months  - thinks this is due to the uncertainty and fear re: treatment of her left breast   - feels a sense of overwhelming sadness  - is frequently close to crying  - is more irritable than usual  - thinks she may benefit from being treated, but would like to try something different than Prozac   - reports that Prozac made her more empathetic and she like to be    - family history significant for mother, father, brother, and sister with depression   - reports that father is being treated successfully with Zoloft    - would be interested in trying this    3. Patient would like to be screened for hypothyroidism:  - feels more fatigued than usual  - feeling more depressed than usual (see above)    - no prior history of hypothyroidism  - no heat or cold intolerance  - no significant weight gain or weight loss    4. Patient's breast surgeon would like the patient to be screened for tuberculosis:  - this is a rare cause of recurrent breast abscesses  - no cough or hemoptysis  - no unintentional weight loss, anorexia, or night sweats  - no known tuberculosis exposures    The medication, allergy, and problem lists have been reviewed and updated as appropriate.     OBJECTIVE:                                                      BP (!) 130/98 (BP Location: Left arm, Patient Position: Chair, Cuff Size: Adult Regular)  Pulse 95  Temp 98.2  F (36.8  C) (Oral)  Ht 5' 9\" (1.753 m)  Wt 175 lb 14.4 oz (79.8 kg)  LMP 03/15/2017 (Exact Date)  SpO2 100%  Breastfeeding? No  BMI 25.98 kg/m2  Constitutional: well-appearing  Neck: supple, symmetric, no thyromegaly or lymphadenopathy  Musculoskeletal:   - mild bilateral lateral hip pain with lateral pelvic pressure   - tenderness " of bilateral pes anserine bursa bilaterally, right more than left   - tenderness along the right Achilles tendon   - tenderness of plantar fascia's insertion on heels bilaterally   - trace, symmetric, nonpitting edema of lateral ankles   - gait, station, and transfers all normal  Psychologic: normal mood and affect     ASSESSMENT/PLAN:                                                      (M79.604,  M79.605) Leg pain, bilateral  (primary encounter diagnosis)  Comment:    - suspect multiple musculoskeletal issues related to recent trip to [place].   - symptoms and exam are consistent with trochanteric bursitis, has anserine bursitis, Achilles tendinitis, and plantar fasciitis   - low suspicion for organic etiology like electrolyte or blood disorder, but cannot rule out.  Plan:    - see below re: suspected etiologies.   - CBC, CMP, mag, phos, and vitamin D level today.     (M70.61,  M70.62) Trochanteric bursitis of both hips  Comment: Per patient this is improving over time and currently mild.  Plan:    - avoid repetitive hip movements (like squats).   - cool compresses and NSAIDs as needed.     (M70.50) Pes anserine bursitis  Comment: Bilateral, right more than left.  Plan:    - avoid repetitive movements (like squats).   - cool compresses and NSAIDs as needed.       (M72.2) Plantar fasciitis  Comment: Bilateral.  Plan:    - wear supportive shoes at all times, even at home.   - wear heel inserts in shoes.   - roll feet over tennis ball.    - cool compresses and NSAIDs as needed.    (M76.61) Achilles tendinitis of right lower extremity  Plan:    - wear supportive shoes at all times, even at home.   - wear heel inserts in shoes.   - Achilles stretches.     - cool compresses and NSAIDs as needed.    (M25.471,  M25.472) Swelling of both ankles  Comment:     - trace, symmetric, nonpitting edema of lateral ankles.   - not particularly concerning.  Plan: Continue to monitor.    (N91.5) Oligomenorrhea  Comment: Likely due to  patient's recent physical and mental stressors.  Plan:    - will check TSH, FSH, and prolactin.    - If labs unremarkable and continued oligomenorrhea, can consider oral contraceptive to help regulate periods.    (F33.1) Moderate episode of recurrent major depressive disorder (H)  Plan:   - START Zoloft, half tab for 1 week then increase to full tab.   - follow-up in next weeks (earlier as needed)    (Z11.1) Screening for tuberculosis  Comment: asymptomatic.  Plan: PPD today - patient to return in 48-72 hours for read.    (Z13.29) Screening for thyroid disorder  Plan: TSH with free T4 reflex today.     The instructions on the AVS were discussed and explained to the patient. Patient expressed understanding of instructions.    A total of 45 minutes were spent face-to-face with this patient during this encounter and over half of that time was spent on counseling and coordination of care re: above diagnoses and plans of care.     Josselin Garcia MD   [address] [address]  T: [phone], F: [phone]

## 2017-03-21 ENCOUNTER — OFFICE VISIT (OUTPATIENT)
Dept: SURGERY | Facility: CLINIC | Age: 37
End: 2017-03-21
Payer: COMMERCIAL

## 2017-03-21 ENCOUNTER — HOSPITAL ENCOUNTER (OUTPATIENT)
Dept: MAMMOGRAPHY | Facility: CLINIC | Age: 37
Discharge: HOME | End: 2017-03-21
Attending: SURGERY | Admitting: SURGERY
Payer: COMMERCIAL

## 2017-03-21 DIAGNOSIS — Z09 SURGERY FOLLOW-UP: ICD-10-CM

## 2017-03-21 DIAGNOSIS — N61.0 BREAST INFECTION: ICD-10-CM

## 2017-03-21 DIAGNOSIS — Z09 SURGERY FOLLOW-UP: Primary | ICD-10-CM

## 2017-03-21 LAB
GRAM STN SPEC: NORMAL
Lab: NORMAL
MICRO REPORT STATUS: NORMAL
SPECIMEN SOURCE: NORMAL

## 2017-03-21 PROCEDURE — 87205 SMEAR GRAM STAIN: CPT | Performed by: SURGERY

## 2017-03-21 PROCEDURE — 87070 CULTURE OTHR SPECIMN AEROBIC: CPT | Performed by: SURGERY

## 2017-03-21 PROCEDURE — 76642 ULTRASOUND BREAST LIMITED: CPT | Mod: LT

## 2017-03-21 PROCEDURE — 87102 FUNGUS ISOLATION CULTURE: CPT | Performed by: SURGERY

## 2017-03-21 PROCEDURE — 87076 CULTURE ANAEROBE IDENT EACH: CPT | Performed by: SURGERY

## 2017-03-21 PROCEDURE — 87075 CULTR BACTERIA EXCEPT BLOOD: CPT | Performed by: SURGERY

## 2017-03-21 PROCEDURE — 99024 POSTOP FOLLOW-UP VISIT: CPT | Performed by: SURGERY

## 2017-03-21 RX ORDER — AMPICILLIN TRIHYDRATE 500 MG
500 CAPSULE ORAL 4 TIMES DAILY
Qty: 56 CAPSULE | Refills: 0 | Status: SHIPPED | OUTPATIENT
Start: 2017-03-21 | End: 2017-04-04

## 2017-03-21 NOTE — MR AVS SNAPSHOT
After Visit Summary   3/21/2017    Shannen Enamorado    MRN: [id number]           Patient Information     Date Of Birth          1980        Visit Information        Provider Department      3/21/2017 10:30 AM Roxi Mcdonnell MD [facility] General Surgery      Today's Diagnoses     Surgery follow-up    -  1    Breast infection          Care Instructions    You are scheduled for Breast Ultrasound at [facility] on 3/21/17 at 11:15am.        Follow-ups after your visit        Additional Services     Infectious Disease General Adult IP Consult                 Your next 10 appointments already scheduled     Mar 21, 2017 11:15 AM CDT   US BREAST LEFT LIMITED 1-3 QUAD with [id number]   [facility] ([facility])    [address], [address]  [place] [address] [phone]           Please bring a list of your medicines (including vitamins, minerals and over-the-counter drugs). Also, tell your doctor about any allergies you may have. Wear comfortable clothes and leave your valuables at home.  You do not need to do anything special to prepare for your exam.  Please call the Imaging Department at your exam site with any questions.            Mar 23, 2017  9:15 AM CDT   Nurse [facility] with [facility]   [facility]    [address] [address] [phone]              Future tests that were ordered for you today     Open Future Orders        Priority Expected Expires Ordered    Differential Routine  3/20/2018 3/20/2017            Who to contact     If you have questions or need follow up information about today's clinic visit or your schedule please contact [facility] directly at [phone].  Normal or non-critical lab and imaging results will be communicated to you by MyChart, letter  or phone within 4 business days after the clinic has received the results. If you do not hear from us within 7 days, please contact the clinic through Trovebox or phone. If you have a critical or abnormal lab result, we will notify you by phone as soon as possible.  Submit refill requests through Trovebox or call your pharmacy and they will forward the refill request to us. Please allow 3 business days for your refill to be completed.          Additional Information About Your Visit        [employer] Information     Trovebox gives you secure access to your electronic health record. If you see a primary care provider, you can also send messages to your care team and make appointments. If you have questions, please call your primary care clinic.  If you do not have a primary care provider, please call [phone] and they will assist you.        Care EveryWhere ID     This is your Care EveryWhere ID. This could be used by other organizations to access your [place] medical records  [id number]        Your Vitals Were     Last Period                   03/15/2017 (Exact Date)            Blood Pressure from Last 3 Encounters:   03/20/17 (!) 130/98   02/23/17 (!) 153/110   02/09/17 125/80    Weight from Last 3 Encounters:   03/20/17 175 lb 14.4 oz (79.8 kg)   02/23/17 170 lb (77.1 kg)   02/09/17 175 lb (79.4 kg)              We Performed the Following     Anaerobic bacterial culture     Fungus Culture, non-blood     Gram stain     Infectious Disease General Adult IP Consult     Wound Culture Aerobic Bacterial          Today's Medication Changes          These changes are accurate as of: 3/21/17 11:12 AM.  If you have any questions, ask your nurse or doctor.               Start taking these medicines.        Dose/Directions    ampicillin 500 MG capsule   Commonly known as:  PRINCIPEN   Used for:  Breast infection   Started by:  Roxi Mcdonnell MD        Dose:  500 mg   Take 1 capsule (500 mg) by mouth 4 times daily for 14  "days   Quantity:  56 capsule   Refills:  0            Where to get your medicines      These medications were sent to [facility]/pharmacy #[id number] - [place], [place] - [address]  [address] [place] [address]     Phone:  [phone]     ampicillin 500 MG capsule                Primary Care Provider Office Phone # Fax #    Swapna Rosas [phone]       [facility] OB GYN CONSULT [address]  [facility] [address]        Thank you!     Thank you for choosing [facility]  for your care. Our goal is always to provide you with excellent care. Hearing back from our patients is one way we can continue to improve our services. Please take a few minutes to complete the written survey that you may receive in the mail after your visit with us. Thank you!             Your Updated Medication List - Protect others around you: Learn how to safely use, store and throw away your medicines at www.disposemymeds.org.          This list is accurate as of: 3/21/17 11:12 AM.  Always use your most recent med list.                   Brand Name Dispense Instructions for use    ampicillin 500 MG capsule    PRINCIPEN    56 capsule    Take 1 capsule (500 mg) by mouth 4 times daily for 14 days       * CURITY PLAIN PACKING STRIP Misc     1 each    Pack both incisions with packing strip once daily. Cover with 4x4\" gauze pads and tape.       * Gauze Dressing 4\"X4\" Pads     24 each    Use gauze pads to cover incisions after packing with packing strips.       HYDROcodone-acetaminophen 5-325 MG per tablet    NORCO    20 tablet    Take 1-2 tablets by mouth every 4 hours as needed for moderate to severe pain       MEDIPORE SOFT CLOTH TAPE Tape     1 each    Tape over gauze pads once daily to keep gauze and packing in place and prevent drainage from leaking.       NONFORMULARY      Supplements for migraines       sertraline 50 MG tablet    ZOLOFT    30 tablet    Take 1 tablet (50 mg) by mouth " daily       triamcinolone 0.5 % cream    KENALOG     Apply topically 2 times daily       * Notice:  This list has 2 medication(s) that are the same as other medications prescribed for you. Read the directions carefully, and ask your doctor or other care provider to review them with you.

## 2017-03-21 NOTE — LETTER
2017    Surgery Postoperative Note    RE:  Shannen Enamorado-:  [birth year]     S: Sahnnen returns today for evaluation of her left breast. She was seen by Dr. Osullivan yesterday and labs were obtained which revealed a leukocytosis with a white count of 12.4. She has felt under the weather for the last couple of days. As far as her left breast symptoms she continues to have intermittent drainage from the punch biopsy site and is able with palpation to express purulent type drainage. She denies fevers and chills.      Left breast: Ongoing areas of induration across medial and superior aspect of left breast with a finger of indurated tissue extending laterally. This indurated area is more tender today than it had been a week and half ago. Ongoing erythema near the prior surgical excisional biopsy. Purulent fluid was expressed from the punch biopsy site and this was cultured.     A/P  Shannen Enamorado has a complicated left breast cellulitis and abscess. She has not had any imaging of her left breast since  and I think given the new lateral findings with elevated white count to be reasonable to obtain a repeat ultrasound today to look for a deeper abscess. She is in agreement with this and this will be done today. We'll await the cultures from the fluid obtained today. Given the leukocytosis and ongoing purulence without any evidence of granulomatous mastitis on any of the biopsies I would like her to see infectious disease for ongoing recommendations for type of antibiotic and duration of antibiotics. For now I will start her on ampicillin to cover Corynebacterium which is the only positive culture that we had and will do this for a 14 day course while awaiting input from infectious disease.     Thank you for the opportunity to help in her care.     Roxi Mcdonnell  [facility]  [phone]

## 2017-03-21 NOTE — PROGRESS NOTES
Surgery Postoperative Note    S: Shannen returns today for evaluation of her left breast.  She was seen by Dr. Osullivan yesterday and labs were obtained which revealed a leukocytosis with a white count of 12.4.  She has felt under the weather for the last couple of days.  As far as her left breast symptoms she continues to have intermittent drainage from the punch biopsy site and is able with palpation to express purulent type drainage.  She denies fevers and chills.     Left breast: Ongoing areas of induration across medial and superior aspect of left breast with a finger of indurated tissue extending laterally.  This indurated area is more tender today than it had been a week and half ago.  Ongoing erythema near the prior surgical excisional biopsy.  Purulent fluid was expressed from the punch biopsy site and this was cultured.    A/P  Shannen Enamorado has a complicated left breast cellulitis and abscess.  She has not had any imaging of her left breast since February 19 and I think given the new lateral findings with elevated white count to be reasonable to obtain a repeat ultrasound today to look for a deeper abscess.  She is in agreement with this and this will be done today.  We'll await the cultures from the fluid obtained today.  Given the leukocytosis and ongoing purulence without any evidence of granulomatous mastitis on any of the biopsies I would like her to see infectious disease for ongoing recommendations for type of antibiotic and duration of antibiotics.  For now I will start her on ampicillin to cover Corynebacterium which is the only positive culture that we had and will do this for a 14 day course while awaiting input from infectious disease.    Thank you for the opportunity to help in her care.    Roxi Mcdonnell  [facility]  [phone]    Please route or send letter to:  Primary Care Provider (PCP) and Referring Provider

## 2017-03-23 ENCOUNTER — ALLIED HEALTH/NURSE VISIT (OUTPATIENT)
Dept: NURSING | Facility: CLINIC | Age: 37
End: 2017-03-23
Payer: COMMERCIAL

## 2017-03-23 DIAGNOSIS — Z11.1 SCREENING EXAMINATION FOR PULMONARY TUBERCULOSIS: Primary | ICD-10-CM

## 2017-03-23 LAB
BACTERIA SPEC CULT: NO GROWTH
Lab: NORMAL
MICRO REPORT STATUS: NORMAL
PPDINDURATION: 0 MM (ref 0–5)
PPDREDNESS: 0 MM
SPECIMEN SOURCE: NORMAL

## 2017-03-23 PROCEDURE — 99207 ZZC NO CHARGE NURSE ONLY: CPT

## 2017-03-28 LAB
BACTERIA SPEC CULT: ABNORMAL
Lab: ABNORMAL
MICRO REPORT STATUS: ABNORMAL
SPECIMEN SOURCE: ABNORMAL

## 2017-03-29 ENCOUNTER — TRANSFERRED RECORDS (OUTPATIENT)
Dept: HEALTH INFORMATION MANAGEMENT | Facility: CLINIC | Age: 37
End: 2017-03-29

## 2017-04-04 ENCOUNTER — OFFICE VISIT (OUTPATIENT)
Dept: SURGERY | Facility: CLINIC | Age: 37
End: 2017-04-04
Payer: COMMERCIAL

## 2017-04-04 ENCOUNTER — HOSPITAL ENCOUNTER (OUTPATIENT)
Dept: LAB | Facility: CLINIC | Age: 37
Discharge: HOME | End: 2017-04-04
Attending: SURGERY | Admitting: SURGERY
Payer: COMMERCIAL

## 2017-04-04 DIAGNOSIS — N61.0 INFECTION OF LEFT BREAST: ICD-10-CM

## 2017-04-04 DIAGNOSIS — Z09 SURGERY FOLLOW-UP EXAMINATION: Primary | ICD-10-CM

## 2017-04-04 PROCEDURE — 88305 TISSUE EXAM BY PATHOLOGIST: CPT | Performed by: SURGERY

## 2017-04-04 PROCEDURE — 87070 CULTURE OTHR SPECIMN AEROBIC: CPT | Performed by: SURGERY

## 2017-04-04 PROCEDURE — 88312 SPECIAL STAINS GROUP 1: CPT | Performed by: SURGERY

## 2017-04-04 PROCEDURE — 87102 FUNGUS ISOLATION CULTURE: CPT | Performed by: SURGERY

## 2017-04-04 PROCEDURE — 88312 SPECIAL STAINS GROUP 1: CPT | Mod: 26 | Performed by: SURGERY

## 2017-04-04 PROCEDURE — 87206 SMEAR FLUORESCENT/ACID STAI: CPT | Mod: 90 | Performed by: SURGERY

## 2017-04-04 PROCEDURE — 99000 SPECIMEN HANDLING OFFICE-LAB: CPT | Performed by: SURGERY

## 2017-04-04 PROCEDURE — 87116 MYCOBACTERIA CULTURE: CPT | Mod: 90 | Performed by: SURGERY

## 2017-04-04 PROCEDURE — 99024 POSTOP FOLLOW-UP VISIT: CPT | Performed by: SURGERY

## 2017-04-04 PROCEDURE — 88305 TISSUE EXAM BY PATHOLOGIST: CPT | Mod: 26 | Performed by: SURGERY

## 2017-04-04 NOTE — LETTER
2017    Surgery Follow Up    RE:  Shannen Enamorado-:  [birth year]     Shannen returns to clinic today after her appt with Dr. Lanier with Infectious disease. She reports that the pain in the left breast is improving. She felt when she was on amoxicillin the left breast pain and erythema had improved and it is slightly worse now. She has had small amounts of drainage from the breast since our last appt.      O: LMP 03/15/2017 (Exact Date)  Left breast: overall skin appears improved, scar tissue across medial upper breast healing, small amount of purulent drainage which was swabbed and cultured.      Procedure:   Punch biopsy x2: the left breast was prepped with chlorohexidine. 1% lidocaine was injected for local anesthetic. A 3mm punch was then used to obtain two samples from the draining area and one sample from more normal appearing skin and breast tissue near the scar tissue. Hemostasis was achieved with silver nitrate. The patient tolerated the procedure well. The specimen were placed in a sterile specimen container and sent fresh to the laboratory.      A/P: Ms. Enamorado is a 36yof who has had a chronic left breast infection. She has had two prior biopsies which were negative for malignacy and one prior biopsy which was negative for granulomatous mastitis. She is currently off abx. I appreciate Dr. Lanier's recommendations and the biopsies today were sent for AFB stains/cultures, fungal cultures, gram stain with aerobic and anaerobic cultures with remaining tissue sent to pathology to again evaluate for granulomatous disease and malignancy. I will call her with the results.      Roxi Mcdonnell MD  [facility]  [phone]

## 2017-04-04 NOTE — PROGRESS NOTES
Surgery Follow Up    Shannen returns to clinic today after her appt with Dr. Lanier with Infectious disease. She reports that the pain in the left breast is improving. She felt when she was on amoxicillin the left breast pain and erythema had improved and it is slightly worse now. She has had small amounts of drainage from the breast since our last appt.     O: LMP 03/15/2017 (Exact Date)  Left breast: overall skin appears improved, scar tissue across medial upper breast healing, small amount of purulent drainage which was swabbed and cultured.     Procedure:   Punch biopsy x2: the left breast was prepped with chlorohexidine. 1% lidocaine was injected for local anesthetic. A 3mm punch was then used to obtain two samples from the draining area and one sample from more normal appearing skin and breast tissue near the scar tissue. Hemostasis was achieved with silver nitrate. The patient tolerated the procedure well. The specimen were placed in a sterile specimen container and sent fresh to the laboratory.     A/P: Ms. Enamorado is a 36yof who has had a chronic left breast infection. She has had two prior biopsies which were negative for malignacy and one prior biopsy which was negative for granulomatous mastitis. She is currently off abx. I appreciate Dr. Lanier's recommendations and the biopsies today were sent for AFB stains/cultures, fungal cultures, gram stain with aerobic and anaerobic cultures with remaining tissue sent to pathology to again evaluate for granulomatous disease and malignancy. I will call her with the results.     Roxi Mcdonnell MD  [facility]  [phone]

## 2017-04-04 NOTE — MR AVS SNAPSHOT
After Visit Summary   4/4/2017    Shannen Enamorado    MRN: [id number]           Patient Information     Date Of Birth          1980        Visit Information        Provider Department      4/4/2017 10:00 AM Roxi Mcdonnell MD [facility] General Surgery      Today's Diagnoses     Surgery follow-up examination    -  1    Infection of left breast           Follow-ups after your visit        Future tests that were ordered for you today     Open Future Orders        Priority Expected Expires Ordered    Surgical pathology exam Routine 4/4/2017 4/4/2017 4/4/2017            Who to contact     If you have questions or need follow up information about today's clinic visit or your schedule please contact [facility] directly at [phone].  Normal or non-critical lab and imaging results will be communicated to you by [employer], letter or phone within 4 business days after the clinic has received the results. If you do not hear from us within 7 days, please contact the clinic through [employer] or phone. If you have a critical or abnormal lab result, we will notify you by phone as soon as possible.  Submit refill requests through Extraprise or call your pharmacy and they will forward the refill request to us. Please allow 3 business days for your refill to be completed.          Additional Information About Your Visit        MyChart Information     Extra[employer] gives you secure access to your electronic health record. If you see a primary care provider, you can also send messages to your care team and make appointments. If you have questions, please call your primary care clinic.  If you do not have a primary care provider, please call [phone] and they will assist you.        Care EveryWhere ID     This is your Care EveryWhere ID. This could be used by other organizations to access your [place] medical records  [id number]        Your Vitals Were   "   Last Period                   03/15/2017 (Exact Date)            Blood Pressure from Last 3 Encounters:   03/20/17 (!) 130/98   02/23/17 (!) 153/110   02/09/17 125/80    Weight from Last 3 Encounters:   03/20/17 175 lb 14.4 oz (79.8 kg)   02/23/17 170 lb (77.1 kg)   02/09/17 175 lb (79.4 kg)              We Performed the Following     AFB Culture Non Blood     AFB Stain Non Blood     Fungus Culture, non-blood     Wound Culture Aerobic Bacterial     Wound Culture Aerobic Bacterial        Primary Care Provider Office Phone # Fax #    Swapna Rosas [phone]       [facility] OB GYN CONS[facility] [address] [address]        Thank you!     Thank you for choosing [facility]  for your care. Our goal is always to provide you with excellent care. Hearing back from our patients is one way we can continue to improve our services. Please take a few minutes to complete the written survey that you may receive in the mail after your visit with us. Thank you!             Your Updated Medication List - Protect others around you: Learn how to safely use, store and throw away your medicines at www.disposemymeds.org.          This list is accurate as of: 4/4/17 10:59 AM.  Always use your most recent med list.                   Brand Name Dispense Instructions for use    * CURITY PLAIN PACKING STRIP Misc     1 each    Pack both incisions with packing strip once daily. Cover with 4x4\" gauze pads and tape.       * Gauze Dressing 4\"X4\" Pads     24 each    Use gauze pads to cover incisions after packing with packing strips.       HYDROcodone-acetaminophen 5-325 MG per tablet    NORCO    20 tablet    Take 1-2 tablets by mouth every 4 hours as needed for moderate to severe pain       MEDIPORE SOFT CLOTH TAPE Tape     1 each    Tape over gauze pads once daily to keep gauze and packing in place and prevent drainage from leaking.       NONFORMULARY      Supplements for migraines       " sertraline 50 MG tablet    ZOLOFT    30 tablet    Take 1 tablet (50 mg) by mouth daily       triamcinolone 0.5 % cream    KENALOG     Apply topically 2 times daily       * Notice:  This list has 2 medication(s) that are the same as other medications prescribed for you. Read the directions carefully, and ask your doctor or other care provider to review them with you.

## 2017-04-06 LAB
BACTERIA SPEC CULT: NO GROWTH
BACTERIA SPEC CULT: NO GROWTH
COPATH REPORT: NORMAL
Lab: NORMAL
MICRO REPORT STATUS: NORMAL
MICRO REPORT STATUS: NORMAL
SPECIMEN SOURCE: NORMAL
SPECIMEN SOURCE: NORMAL

## 2017-04-07 ENCOUNTER — TELEPHONE (OUTPATIENT)
Dept: SURGERY | Facility: CLINIC | Age: 37
End: 2017-04-07

## 2017-04-07 LAB
ACID FAST STN SPEC QL: NORMAL
MICRO REPORT STATUS: NORMAL
SPECIMEN SOURCE: NORMAL

## 2017-04-07 NOTE — TELEPHONE ENCOUNTER
I called Shannen with the results of our latest left breast punch biopsy. 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 The AFB and fungal stains are negative. She had a number of other labs sent from Dr. Lanier with ID. These are pending. I will call Dr. Lanier Monday to discuss ongoing care and plan. With granulomatous mastitis it would be reasonable to try a trial of steroids vs continue expectant management as she has been doing.     Roxi Mcdonnell MD  [facility]  [phone]

## 2017-04-10 ENCOUNTER — TELEPHONE (OUTPATIENT)
Dept: SURGERY | Facility: CLINIC | Age: 37
End: 2017-04-10

## 2017-04-10 DIAGNOSIS — N61.20 GRANULOMATOUS MASTITIS: ICD-10-CM

## 2017-04-10 DIAGNOSIS — N61.20 GRANULOMATOUS MASTITIS: Primary | ICD-10-CM

## 2017-04-10 RX ORDER — PREDNISONE 10 MG/1
30 TABLET ORAL DAILY
Qty: 14 TABLET | Refills: 0 | Status: SHIPPED | OUTPATIENT
Start: 2017-04-10 | End: 2017-04-10

## 2017-04-10 RX ORDER — PREDNISONE 10 MG/1
30 TABLET ORAL DAILY
Qty: 28 TABLET | Refills: 0 | Status: SHIPPED | OUTPATIENT
Start: 2017-04-10 | End: 2017-06-05

## 2017-04-10 NOTE — TELEPHONE ENCOUNTER
Name of caller: Patient    Reason for Call:  Prescription was written incorrectly    Surgeon:  Dr. Mcdonnell    Recent Surgery:  Yes.    If yes, when & what type:  I & D left breast abscess  03/21/17      Best phone number to reach pt at is: [phone]  Ok to leave a message with medical info? Yes.    Pharmacy preferred (if calling for a refill): na

## 2017-04-10 NOTE — TELEPHONE ENCOUNTER
RX for prednisone was written for 14 pills instead of 14 days.  Will order an additional 28 tablets of prednisone 10mg per 's order of 30mg daily for two weeks.    Patient also wondering if she can come in Tuesday for a f/u rather than Friday d/t her work schedule.    Will ask  if it is ok for patient to wait until Tuesday.    Ayleen Braga RN

## 2017-04-10 NOTE — TELEPHONE ENCOUNTER
I called Shannen after speaking to Dr. 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 She is in agreement with a trial of steroids and no need for ongoing antibiotics. I left a voicemail for Shannen with this information. I ordered prednisone 30mg once daily for two weeks. I would like to see Shannen in clinic Friday afternoon as well as next Friday. If after two weeks symptoms are improving we will taper her steroids. If no change, I would increase the dose to 60mg for two weeks. I encouraged her to call with questions.     Roxi Mcdonnell MD  [facility]  [phone]

## 2017-04-12 ENCOUNTER — MYC MEDICAL ADVICE (OUTPATIENT)
Dept: SURGERY | Facility: CLINIC | Age: 37
End: 2017-04-12

## 2017-04-17 ENCOUNTER — OFFICE VISIT (OUTPATIENT)
Dept: SURGERY | Facility: CLINIC | Age: 37
End: 2017-04-17
Payer: COMMERCIAL

## 2017-04-17 DIAGNOSIS — N61.20 GRANULOMATOUS MASTITIS: Primary | ICD-10-CM

## 2017-04-17 PROCEDURE — 99024 POSTOP FOLLOW-UP VISIT: CPT | Performed by: SURGERY

## 2017-04-17 RX ORDER — PREDNISONE 10 MG/1
40 TABLET ORAL DAILY
Qty: 56 TABLET | Refills: 0 | Status: SHIPPED | OUTPATIENT
Start: 2017-04-17 | End: 2017-05-01

## 2017-04-17 NOTE — MR AVS SNAPSHOT
After Visit Summary   4/17/2017    Shannen Enamorado    MRN: [id number]           Patient Information     Date Of Birth          1980        Visit Information        Provider Department      4/17/2017 10:00 AM Roxi Mcdonnell MD [facility] General Surgery      Today's Diagnoses     Granulomatous mastitis    -  1       Follow-ups after your visit        Your next 10 appointments already scheduled     May 01, 2017  2:30 PM CDT   Post Op with Roxi Mcdonnell MD   [facility] ([facility])    [address]  [facility] [address] [phone]              Who to contact     If you have questions or need follow up information about today's clinic visit or your schedule please contact [facility] directly at [phone].  Normal or non-critical lab and imaging results will be communicated to you by [employer], letter or phone within 4 business days after the clinic has received the results. If you do not hear from us within 7 days, please contact the clinic through [employer] or phone. If you have a critical or abnormal lab result, we will notify you by phone as soon as possible.  Submit refill requests through [employer] or call your pharmacy and they will forward the refill request to us. Please allow 3 business days for your refill to be completed.          Additional Information About Your Visit        MyChart Information     [employer] gives you secure access to your electronic health record. If you see a primary care provider, you can also send messages to your care team and make appointments. If you have questions, please call your primary care clinic.  If you do not have a primary care provider, please call [phone] and they will assist you.        Care EveryWhere ID     This is your Care EveryWhere ID. This could be used by other organizations to access your [facility]  records  [id number]        Your Vitals Were     Last Period                   03/15/2017 (Exact Date)            Blood Pressure from Last 3 Encounters:   03/20/17 (!) 130/98   02/23/17 (!) 153/110   02/09/17 125/80    Weight from Last 3 Encounters:   03/20/17 175 lb 14.4 oz (79.8 kg)   02/23/17 170 lb (77.1 kg)   02/09/17 175 lb (79.4 kg)              Today, you had the following     No orders found for display         Today's Medication Changes          These changes are accurate as of: 4/17/17 10:31 AM.  If you have any questions, ask your nurse or doctor.               These medicines have changed or have updated prescriptions.        Dose/Directions    * predniSONE 10 MG tablet   Commonly known as:  DELTASONE   This may have changed:  Another medication with the same name was added. Make sure you understand how and when to take each.   Used for:  Granulomatous mastitis        Dose:  30 mg   Take 3 tablets (30 mg) by mouth daily   Quantity:  28 tablet   Refills:  0       * predniSONE 10 MG tablet   Commonly known as:  DELTASONE   This may have changed:  You were already taking a medication with the same name, and this prescription was added. Make sure you understand how and when to take each.   Used for:  Granulomatous mastitis        Dose:  40 mg   Take 4 tablets (40 mg) by mouth daily for 14 days   Quantity:  56 tablet   Refills:  0       * Notice:  This list has 2 medication(s) that are the same as other medications prescribed for you. Read the directions carefully, and ask your doctor or other care provider to review them with you.         Where to get your medicines      These medications were sent to [facility]/pharmacy #[id number] - [place], [place]  [address] [address]     Phone:  [phone]     predniSONE 10 MG tablet                Primary Care Provider Office Phone # Fax #    Swanpa Rosas [phone]       [facility] OB GYN CONSULT [address]  [address]  [facility] [address]        Thank you!     Thank you for choosing [facility]  for your care. Our goal is always to provide you with excellent care. Hearing back from our patients is one way we can continue to improve our services. Please take a few minutes to complete the written survey that you may receive in the mail after your visit with us. Thank you!             Your Updated Medication List - Protect others around you: Learn how to safely use, store and throw away your medicines at www.disposemymeds.org.          This list is accurate as of: 4/17/17 10:31 AM.  Always use your most recent med list.                   Brand Name Dispense Instructions for use    * CURITY PLAIN PACKING STRIP Misc     1 each    Pack both incisions with packing strip once daily. Cover with 4x4\" gauze pads and tape.       * Gauze Dressing 4\"X4\" Pads     24 each    Use gauze pads to cover incisions after packing with packing strips.       HYDROcodone-acetaminophen 5-325 MG per tablet    NORCO    20 tablet    Take 1-2 tablets by mouth every 4 hours as needed for moderate to severe pain       MEDIPORE SOFT CLOTH TAPE Tape     1 each    Tape over gauze pads once daily to keep gauze and packing in place and prevent drainage from leaking.       NONFORMULARY      Supplements for migraines       * predniSONE 10 MG tablet    DELTASONE    28 tablet    Take 3 tablets (30 mg) by mouth daily       * predniSONE 10 MG tablet    DELTASONE    56 tablet    Take 4 tablets (40 mg) by mouth daily for 14 days       sertraline 50 MG tablet    ZOLOFT    30 tablet    Take 1 tablet (50 mg) by mouth daily       triamcinolone 0.5 % cream    KENALOG     Apply topically 2 times daily       * Notice:  This list has 4 medication(s) that are the same as other medications prescribed for you. Read the directions carefully, and ask your doctor or other care provider to review them with you.      "

## 2017-04-17 NOTE — PROGRESS NOTES
Shannen returns for a follow up visit after starting prednisone 30mg /day for granulomatous mastitis 8 days ago. She noticed initial improvement. Over the past two days there was some increased swelling in the left breast. She did start menstruating yesterday. Minimal drainage. No fevers. Minimal side effects from prednisone.     O:   Left breast: improving induration throughout, no drainage, scarring on medial aspect of breast improving. Nipple inversion improving.     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 Some improvement on prednisone. There is literature to support increasing the dose after two weeks if there is not much improvement. She is interested in trying this. We will increase the dose to 40mg for two weeks. I will see her back at that time.     Roxi Mcdonnell MD  [facility]  [phone]

## 2017-04-17 NOTE — LETTER
2017    RE:  Shannen Enamorado-:  [birth year]    Shannen returns for a follow up visit after starting prednisone 30mg /day for granulomatous mastitis 8 days ago. She noticed initial improvement. Over the past two days there was some increased swelling in the left breast. She did start menstruating yesterday. Minimal drainage. No fevers. Minimal side effects from prednisone.      O:   Left breast: improving induration throughout, no drainage, scarring on medial aspect of breast improving. Nipple inversion improving.      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 Some improvement on prednisone. There is literature to support increasing the dose after two weeks if there is not much improvement. She is interested in trying this. We will increase the dose to 40mg for two weeks. I will see her back at that time.      Roxi Mcdonnell MD  [facility]  [phone]

## 2017-04-18 LAB
FUNGUS SPEC CULT: NORMAL
Lab: NORMAL
MICRO REPORT STATUS: NORMAL
SPECIMEN SOURCE: NORMAL

## 2017-04-26 ENCOUNTER — TELEPHONE (OUTPATIENT)
Dept: SURGERY | Facility: CLINIC | Age: 37
End: 2017-04-26

## 2017-04-26 NOTE — TELEPHONE ENCOUNTER
"I called Shannen as she has increased pain and redness on the left lateral aspect of the left breast. It feels warm and there is a \"band\" of hard tissue. This started on Saturday morning. She tried wine and caffeine over the weekend and thinks this may be related. She denies fevers. The left breast is very tender. She has not taken any thing yet. She has iced it and this improved the pain slightly. We discussed the natural history of granulomatous mastitis being a waxing and waning disease. One option would be to increase the prednisone to 60mg QD for 1 week and take ibuprofen for pain/inflammation. She would like to try this. I will see her on Monday as previously scheduled.     Roxi Mcdonnell MD  [facility]  [phone]      "

## 2017-04-26 NOTE — TELEPHONE ENCOUNTER
----- Message from Christina Guardado RN sent at 4/26/2017  9:24 AM CDT -----  Regarding: Flare up  Contact: [phone]  Please call patient back at [phone], she thinks that she is having a flare up of the breast abscess. Thank you!    -Christina

## 2017-04-26 NOTE — TELEPHONE ENCOUNTER
Name of caller: Patient    Reason for Call:  Patient having flare up of breast abscess    Surgeon:  Dr. Mcdonnell    Recent Surgery:  Yes    If yes, when & what type:  2/3/17-I&D of left breast abscess      Best phone number to reach pt at is: [phone]  Ok to leave a message with medical info? No    Pharmacy preferred (if calling for a refill): NA

## 2017-05-01 ENCOUNTER — TELEPHONE (OUTPATIENT)
Dept: SURGERY | Facility: CLINIC | Age: 37
End: 2017-05-01

## 2017-05-01 ENCOUNTER — OFFICE VISIT (OUTPATIENT)
Dept: SURGERY | Facility: CLINIC | Age: 37
End: 2017-05-01
Payer: COMMERCIAL

## 2017-05-01 DIAGNOSIS — Z09 SURGERY FOLLOW-UP EXAMINATION: Primary | ICD-10-CM

## 2017-05-01 PROCEDURE — 99024 POSTOP FOLLOW-UP VISIT: CPT | Performed by: SURGERY

## 2017-05-01 RX ORDER — PREDNISONE 10 MG/1
10 TABLET ORAL DAILY
Qty: 38 TABLET | Refills: 1 | Status: SHIPPED | OUTPATIENT
Start: 2017-05-01 | End: 2017-06-05

## 2017-05-01 NOTE — TELEPHONE ENCOUNTER
Name of caller: Felix @ [facility].    Reason for Call:  Clarification on perscription     Surgeon:  Dr. Mcdonnell    Recent Surgery:  Yes.    If yes, when & what type:  I & D left breast 03/21/17       Best phone number to reach pt at is: [phone]  Ok to leave a message with medical info? No    Pharmacy preferred (if calling for a refill): na

## 2017-05-01 NOTE — MR AVS SNAPSHOT
After Visit Summary   5/1/2017    Shannen Enamorado    MRN: [id number]           Patient Information     Date Of Birth          1980        Visit Information        Provider Department      5/1/2017 2:30 PM Roxi Mcdonnell MD [facility] General Surgery      Today's Diagnoses     Surgery follow-up examination    -  1       Follow-ups after your visit        Who to contact     If you have questions or need follow up information about today's clinic visit or your schedule please contact [facility] directly at [phone].  Normal or non-critical lab and imaging results will be communicated to you by [employer], letter or phone within 4 business days after the clinic has received the results. If you do not hear from us within 7 days, please contact the clinic through [employer] or phone. If you have a critical or abnormal lab result, we will notify you by phone as soon as possible.  Submit refill requests through [employer] or call your pharmacy and they will forward the refill request to us. Please allow 3 business days for your refill to be completed.          Additional Information About Your Visit        MyChart Information     [employer] gives you secure access to your electronic health record. If you see a primary care provider, you can also send messages to your care team and make appointments. If you have questions, please call your primary care clinic.  If you do not have a primary care provider, please call [phone] and they will assist you.        Care EveryWhere ID     This is your Care EveryWhere ID. This could be used by other organizations to access your [place] medical records  [id number]         Blood Pressure from Last 3 Encounters:   03/20/17 (!) 130/98   02/23/17 (!) 153/110   02/09/17 125/80    Weight from Last 3 Encounters:   03/20/17 175 lb 14.4 oz (79.8 kg)   02/23/17 170 lb (77.1 kg)   02/09/17 175 lb (79.4 kg)               Today, you had the following     No orders found for display         Today's Medication Changes          These changes are accurate as of: 5/1/17  3:01 PM.  If you have any questions, ask your nurse or doctor.               These medicines have changed or have updated prescriptions.        Dose/Directions    * predniSONE 10 MG tablet   Commonly known as:  DELTASONE   This may have changed:  Another medication with the same name was added. Make sure you understand how and when to take each.   Used for:  Granulomatous mastitis   Changed by:  Roxi Mcdonenll MD        Dose:  30 mg   Take 3 tablets (30 mg) by mouth daily   Quantity:  28 tablet   Refills:  0       * predniSONE 10 MG tablet   Commonly known as:  DELTASONE   This may have changed:  Another medication with the same name was added. Make sure you understand how and when to take each.   Used for:  Granulomatous mastitis   Changed by:  Roxi Mcdonnell MD        Dose:  40 mg   Take 4 tablets (40 mg) by mouth daily for 14 days   Quantity:  56 tablet   Refills:  0       * predniSONE 10 MG tablet   Commonly known as:  DELTASONE   This may have changed:  You were already taking a medication with the same name, and this prescription was added. Make sure you understand how and when to take each.   Used for:  Surgery follow-up examination   Changed by:  Roxi Mcdonnell MD        Dose:  10 mg   Take 1 tablet (10 mg) by mouth daily   Quantity:  38 tablet   Refills:  1       * Notice:  This list has 3 medication(s) that are the same as other medications prescribed for you. Read the directions carefully, and ask your doctor or other care provider to review them with you.         Where to get your medicines      Some of these will need a paper prescription and others can be bought over the counter.  Ask your nurse if you have questions.     Bring a paper prescription for each of these medications     predniSONE 10 MG tablet                Primary Care  "Provider Office Phone # Fax #    Swapna Rosas [phone]       [facility] OB GYN CONSULT [address] [address]        Thank you!     Thank you for choosing [facility]  for your care. Our goal is always to provide you with excellent care. Hearing back from our patients is one way we can continue to improve our services. Please take a few minutes to complete the written survey that you may receive in the mail after your visit with us. Thank you!             Your Updated Medication List - Protect others around you: Learn how to safely use, store and throw away your medicines at www.disposemymeds.org.          This list is accurate as of: 5/1/17  3:01 PM.  Always use your most recent med list.                   Brand Name Dispense Instructions for use    * CURITY PLAIN PACKING STRIP Misc     1 each    Pack both incisions with packing strip once daily. Cover with 4x4\" gauze pads and tape.       * Gauze Dressing 4\"X4\" Pads     24 each    Use gauze pads to cover incisions after packing with packing strips.       HYDROcodone-acetaminophen 5-325 MG per tablet    NORCO    20 tablet    Take 1-2 tablets by mouth every 4 hours as needed for moderate to severe pain       MEDIPORE SOFT CLOTH TAPE Tape     1 each    Tape over gauze pads once daily to keep gauze and packing in place and prevent drainage from leaking.       NONFORMULARY      Supplements for migraines       * predniSONE 10 MG tablet    DELTASONE    28 tablet    Take 3 tablets (30 mg) by mouth daily       * predniSONE 10 MG tablet    DELTASONE    56 tablet    Take 4 tablets (40 mg) by mouth daily for 14 days       * predniSONE 10 MG tablet    DELTASONE    38 tablet    Take 1 tablet (10 mg) by mouth daily       sertraline 50 MG tablet    ZOLOFT    30 tablet    Take 1 tablet (50 mg) by mouth daily       triamcinolone 0.5 % cream    KENALOG     Apply topically 2 times daily       * Notice:  This list has 5 " medication(s) that are the same as other medications prescribed for you. Read the directions carefully, and ask your doctor or other care provider to review them with you.

## 2017-05-01 NOTE — PROGRESS NOTES
Surgery Follow Up    Shannen returns for recheck of her left breast. She has been on 60mg Prednisone once daily for one week. She reports for the first 3 days on 60mg she had improvement of symptoms; however, the last four days she has a new area on the left lateral breast that is red, firm and painful. This is slightly better today. No fevers. She is having some trouble sleeping. She would like to start weaning prednisone.     O:   Left breast: induration and skin erythema on left lateral breast which extends from the NAC to lateral border of breast, tender, no fistulas, no palpable fluid collection. Medial aspect of breast much improved, no induration or erythema. Nipple improving.     A/P: 36yof with granulomatous mastitis of left breast.   - start to decrease prednisone dose, will do 50mg/day x 2days  40mg/day x2 days, 30mg/day x4 days, 20mg/day x 3 days, 10mg/day x 3 days, 5mg/day x3 days and 2.5mg/day x3 days. She will call or message me with questions or concerns and we will schedule follow up as needed.     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 For now she would like to give this more time, be off steroids and see how it's going. We also discussed possibility of mastectomy with reconstruction if down the road this continues to be an ongoing problem.     Roxi Mcdonnell MD  [facility]  [phone]

## 2017-05-01 NOTE — LETTER
May 1, 2017    Surgery Follow Up    RE:  Shannen Enamorado-:  [birth year]     Shannen returns for recheck of her left breast. She has been on 60mg Prednisone once daily for one week. She reports for the first 3 days on 60mg she had improvement of symptoms; however, the last four days she has a new area on the left lateral breast that is red, firm and painful. This is slightly better today. No fevers. She is having some trouble sleeping. She would like to start weaning prednisone.      O:   Left breast: induration and skin erythema on left lateral breast which extends from the NAC to lateral border of breast, tender, no fistulas, no palpable fluid collection. Medial aspect of breast much improved, no induration or erythema. Nipple improving.      A/P: 36yof with granulomatous mastitis of left breast.   - start to decrease prednisone dose, will do 50mg/day x 2days  40mg/day x2 days, 30mg/day x4 days, 20mg/day x 3 days, 10mg/day x 3 days, 5mg/day x3 days and 2.5mg/day x3 days. She will call or message me with questions or concerns and we will schedule follow up as needed.      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 For now she would like to give this more time, be off steroids and see how it's going. We also discussed possibility of mastectomy with reconstruction if down the road this continues to be an ongoing problem.      Roxi Mcdonnell MD  [facility]  [phone]

## 2017-05-02 LAB
FUNGUS SPEC CULT: NORMAL
Lab: NORMAL
MICRO REPORT STATUS: NORMAL
SPECIMEN SOURCE: NORMAL

## 2017-05-15 ENCOUNTER — OFFICE VISIT (OUTPATIENT)
Dept: SURGERY | Facility: CLINIC | Age: 37
End: 2017-05-15
Payer: COMMERCIAL

## 2017-05-15 ENCOUNTER — TELEPHONE (OUTPATIENT)
Dept: SURGERY | Facility: CLINIC | Age: 37
End: 2017-05-15

## 2017-05-15 DIAGNOSIS — F33.1 MODERATE EPISODE OF RECURRENT MAJOR DEPRESSIVE DISORDER (H): ICD-10-CM

## 2017-05-15 DIAGNOSIS — N61.1 BREAST ABSCESS: Primary | ICD-10-CM

## 2017-05-15 LAB
BACTERIA SPEC CULT: NORMAL
GRAM STN SPEC: NORMAL
MICRO REPORT STATUS: NORMAL
SPECIMEN SOURCE: NORMAL

## 2017-05-15 PROCEDURE — 10061 I&D ABSCESS COMP/MULTIPLE: CPT | Mod: 79 | Performed by: SURGERY

## 2017-05-15 PROCEDURE — 87070 CULTURE OTHR SPECIMN AEROBIC: CPT | Performed by: SURGERY

## 2017-05-15 PROCEDURE — 99207 ZZC NO CHARGE LOS: CPT | Performed by: SURGERY

## 2017-05-15 PROCEDURE — 87077 CULTURE AEROBIC IDENTIFY: CPT | Performed by: SURGERY

## 2017-05-15 PROCEDURE — 87205 SMEAR GRAM STAIN: CPT | Performed by: SURGERY

## 2017-05-15 PROCEDURE — 99214 OFFICE O/P EST MOD 30 MIN: CPT | Mod: 24 | Performed by: SURGERY

## 2017-05-15 RX ORDER — HYDROCODONE BITARTRATE AND ACETAMINOPHEN 5; 325 MG/1; MG/1
1-2 TABLET ORAL EVERY 4 HOURS PRN
Qty: 20 TABLET | Refills: 0 | Status: SHIPPED | OUTPATIENT
Start: 2017-05-15 | End: 2017-11-22

## 2017-05-15 NOTE — MR AVS SNAPSHOT
After Visit Summary   5/15/2017    Shannen Enamorado    MRN: [id number]           Patient Information     Date Of Birth          1980        Visit Information        Provider Department      5/15/2017 10:00 AM Roxi Mcdonnell MD [facility] General Surgery      Today's Diagnoses     Breast abscess    -  1       Follow-ups after your visit        Who to contact     If you have questions or need follow up information about today's clinic visit or your schedule please contact [facility] directly at [phone].  Normal or non-critical lab and imaging results will be communicated to you by Zoraphart, letter or phone within 4 business days after the clinic has received the results. If you do not hear from us within 7 days, please contact the clinic through [employer] or phone. If you have a critical or abnormal lab result, we will notify you by phone as soon as possible.  Submit refill requests through [employer] or call your pharmacy and they will forward the refill request to us. Please allow 3 business days for your refill to be completed.          Additional Information About Your Visit        MyChart Information     [employer] gives you secure access to your electronic health record. If you see a primary care provider, you can also send messages to your care team and make appointments. If you have questions, please call your primary care clinic.  If you do not have a primary care provider, please call [phone] and they will assist you.        Care EveryWhere ID     This is your Care EveryWhere ID. This could be used by other organizations to access your [place] medical records  [id number]         Blood Pressure from Last 3 Encounters:   03/20/17 (!) 130/98   02/23/17 (!) 153/110   02/09/17 125/80    Weight from Last 3 Encounters:   03/20/17 175 lb 14.4 oz (79.8 kg)   02/23/17 170 lb (77.1 kg)   02/09/17 175 lb (79.4 kg)               Today, you had the following     No orders found for display         Today's Medication Changes          These changes are accurate as of: 5/15/17 10:58 AM.  If you have any questions, ask your nurse or doctor.               Start taking these medicines.        Dose/Directions    amoxicillin-clavulanate 875-125 MG per tablet   Commonly known as:  AUGMENTIN   Used for:  Breast abscess   Started by:  Roxi Mcdonnell MD        Dose:  1 tablet   Take 1 tablet by mouth 2 times daily   Quantity:  28 tablet   Refills:  0         These medicines have changed or have updated prescriptions.        Dose/Directions    * HYDROcodone-acetaminophen 5-325 MG per tablet   Commonly known as:  NORCO   This may have changed:  Another medication with the same name was added. Make sure you understand how and when to take each.   Used for:  Surgery follow-up   Changed by:  Roxi Mcdonnell MD        Dose:  1-2 tablet   Take 1-2 tablets by mouth every 4 hours as needed for moderate to severe pain   Quantity:  20 tablet   Refills:  0       * HYDROcodone-acetaminophen 5-325 MG per tablet   Commonly known as:  NORCO   This may have changed:  You were already taking a medication with the same name, and this prescription was added. Make sure you understand how and when to take each.   Used for:  Breast abscess   Changed by:  Roxi Mcdonnell MD        Dose:  1-2 tablet   Take 1-2 tablets by mouth every 4 hours as needed for moderate to severe pain   Quantity:  20 tablet   Refills:  0       * Notice:  This list has 2 medication(s) that are the same as other medications prescribed for you. Read the directions carefully, and ask your doctor or other care provider to review them with you.         Where to get your medicines      These medications were sent to [facility]/pharmacy #[id number] [place], [place]  [address] [address]     Phone:  [phone]     amoxicillin-clavulanate 875-125 MG per tablet        "  Some of these will need a paper prescription and others can be bought over the counter.  Ask your nurse if you have questions.     Bring a paper prescription for each of these medications     HYDROcodone-acetaminophen 5-325 MG per tablet                Primary Care Provider Office Phone # Fax #    Swapna Rosas [phone]       [facility] OB GYN [facility] [address]  [facility] [address]        Thank you!     Thank you for choosing [facility]  for your care. Our goal is always to provide you with excellent care. Hearing back from our patients is one way we can continue to improve our services. Please take a few minutes to complete the written survey that you may receive in the mail after your visit with us. Thank you!             Your Updated Medication List - Protect others around you: Learn how to safely use, store and throw away your medicines at www.disposemymeds.org.          This list is accurate as of: 5/15/17 10:58 AM.  Always use your most recent med list.                   Brand Name Dispense Instructions for use    amoxicillin-clavulanate 875-125 MG per tablet    AUGMENTIN    28 tablet    Take 1 tablet by mouth 2 times daily       * CURITY PLAIN PACKING STRIP Misc     1 each    Pack both incisions with packing strip once daily. Cover with 4x4\" gauze pads and tape.       * Gauze Dressing 4\"X4\" Pads     24 each    Use gauze pads to cover incisions after packing with packing strips.       * HYDROcodone-acetaminophen 5-325 MG per tablet    NORCO    20 tablet    Take 1-2 tablets by mouth every 4 hours as needed for moderate to severe pain       * HYDROcodone-acetaminophen 5-325 MG per tablet    NORCO    20 tablet    Take 1-2 tablets by mouth every 4 hours as needed for moderate to severe pain       MEDIPORE SOFT CLOTH TAPE Tape     1 each    Tape over gauze pads once daily to keep gauze and packing in place and prevent drainage from leaking.       NONFORMULARY "      Supplements for migraines       * predniSONE 10 MG tablet    DELTASONE    28 tablet    Take 3 tablets (30 mg) by mouth daily       * predniSONE 10 MG tablet    DELTASONE    38 tablet    Take 1 tablet (10 mg) by mouth daily       sertraline 50 MG tablet    ZOLOFT    30 tablet    Take 1 tablet (50 mg) by mouth daily       triamcinolone 0.5 % cream    KENALOG     Apply topically 2 times daily       * Notice:  This list has 6 medication(s) that are the same as other medications prescribed for you. Read the directions carefully, and ask your doctor or other care provider to review them with you.

## 2017-05-15 NOTE — LETTER
May 15, 2017    Surgery Follow Up     RE:  Shannen Enamorado-:  [birth year]    S: Shannen returns as over the weekend she has developed significantly worsening pain in her left breast with redness and areas of fluctuance. She was concerned for abscess and came in to clinic for evaluation. She denies fevers. She is weaning her steroids as discussed at her last appt.      Left breast: shiny erythema across left lower and lateral aspect of breast with fluctuance throughout. No drainage.      Procedure: the left breast was prepped with chloraprep, local anethestic was injected. An 18 gauge needle was inserted into the area of the most fluctuance 4cm lateral to the nipple. Purulent fluid was aspirated and sent for culture. 40ml of purulent fluid was then further aspirated. I elected to make a small 1cm incision for more adequate drainage. There was approximately 50ml more purulent fluid which drained. There continued to be fluctuance on the inferiolateral aspect of the breast despite the drainage. A second 1cm incision was made over this area after anesthetic was injected. 40ml of thicker purulent fluid was evacuated. A sample of this fluid was also sent for culture. The wounds were irrigated with saline and packed with 1/4 inch packing gauze.      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 This has now been drained with cultures sent. Given the degree of erythema and induration, we discussed abx treatment as it is likely secondarily infected. Plan for 14day course of augmentin. Continue once daily dressing changes with packing. norco given for pain control. Follow up with me next Monday at 10:30am.   Continue to taper prednisone. We discussed a trial of methotrexate - she would like to Think about this more. We also discussed possible mastectomy. She has discussed reconstruction options with Dr. Razo. We will continue with non operative  management for now and see how things progress.      Thank you for the opportunity to help in her care.     Roxi Mcdonnell  [facility]  [phone]

## 2017-05-15 NOTE — PROGRESS NOTES
Surgery Follow Up    S: Shannen returns as over the weekend she has developed significantly worsening pain in her left breast with redness and areas of fluctuance. She was concerned for abscess and came in to clinic for evaluation. She denies fevers. She is weaning her steroids as discussed at her last appt.     Left breast: shiny erythema across left lower and lateral aspect of breast with fluctuance throughout. No drainage.     Procedure: the left breast was prepped with chloraprep, local anethestic was injected. An 18 gauge needle was inserted into the area of the most fluctuance 4cm lateral to the nipple. Purulent fluid was aspirated and sent for culture. 40ml of purulent fluid was then further aspirated. I elected to make a small 1cm incision for more adequate drainage. There was approximately 50ml more purulent fluid which drained. There continued to be fluctuance on the inferiolateral aspect of the breast despite the drainage. A second 1cm incision was made over this area after anesthetic was injected. 40ml of thicker purulent fluid was evacuated. A sample of this fluid was also sent for culture. The wounds were irrigated with saline and packed with 1/4 inch packing gauze.     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 This has now been drained with cultures sent. Given the degree of erythema and induration, we discussed abx treatment as it is likely secondarily infected. Plan for 14day course of augmentin. Continue once daily dressing changes with packing. norco given for pain control. Follow up with me next Monday at 10:30am.   Continue to taper prednisone. We discussed a trial of methotrexate - she would like to  Think about this more. We also discussed possible mastectomy. She has discussed reconstruction options with Dr. Razo. We will continue with non operative management for now and see how things progress.     Thank you  for the opportunity to help in her care.    Roxi Mcdonnell  [facility]  [phone]    Please route or send letter to:  Primary Care Provider (PCP) and Referring Provider

## 2017-05-15 NOTE — TELEPHONE ENCOUNTER
Name of caller: Patient    Reason for Call:  Questions  Breast abscess is much worse.    Surgeon:  Dr. Mcdonnell    Recent Surgery:  Yes.    If yes, when & what type:  I & D 02/03/17       Best phone number to reach pt at is: [phone]  Ok to leave a message with medical info? Yes.    Pharmacy preferred (if calling for a refill): na

## 2017-05-16 LAB
BACTERIA SPEC CULT: NORMAL
MICRO REPORT STATUS: NORMAL
SPECIMEN SOURCE: NORMAL

## 2017-05-16 NOTE — TELEPHONE ENCOUNTER
Routing refill request to provider for review/approval because:  Patient does not have a PHQ 9 on file.

## 2017-05-16 NOTE — TELEPHONE ENCOUNTER
Sertraline        Last Written Prescription Date: 3/20/17  Last Fill Quantity: 30; # refills: 1  Last Office Visit with [facility], [facility] or [facility] prescribing provider:  3/20/17        Last PHQ-9 score on record= No flowsheet data found.    Lab Results   Component Value Date    AST 8 03/20/2017     Lab Results   Component Value Date    ALT 21 03/20/2017

## 2017-05-18 ENCOUNTER — TELEPHONE (OUTPATIENT)
Dept: SURGERY | Facility: CLINIC | Age: 37
End: 2017-05-18

## 2017-05-18 NOTE — TELEPHONE ENCOUNTER
I called Shannen back regarding questions about a new area on her left upper breast which is more red and she is worried it may develop into an abscess over the weekend. She was wondering if I could see her tomorrow afternoon. Unfortunately I am out of town tomorrow and she does not think we would change the plan today as there is not an abscess yet. I advised her to call tomorrow afternoon if it is getting worse and needs drainage and one of my partners or a PA on our team will be able to see her. She is in agreement with this plan.     Roxi Mcdonnell MD  [facility]  [phone]

## 2017-05-20 LAB
BACTERIA SPEC CULT: NO GROWTH
MICRO REPORT STATUS: NORMAL
SPECIMEN SOURCE: NORMAL

## 2017-05-21 LAB
BACTERIA SPEC CULT: ABNORMAL
MICRO REPORT STATUS: ABNORMAL
SPECIMEN SOURCE: ABNORMAL

## 2017-05-24 NOTE — TELEPHONE ENCOUNTER
Pt took appt for 6/5/17. Dr Garcia can you fill her Sertraline till then.    Please send script to [facility].

## 2017-05-31 LAB
MICRO REPORT STATUS: NORMAL
MYCOBACTERIUM SPEC CULT: NORMAL
SPECIMEN SOURCE: NORMAL

## 2017-06-05 ENCOUNTER — OFFICE VISIT (OUTPATIENT)
Dept: INTERNAL MEDICINE | Facility: CLINIC | Age: 37
End: 2017-06-05
Payer: COMMERCIAL

## 2017-06-05 VITALS
DIASTOLIC BLOOD PRESSURE: 88 MMHG | HEART RATE: 83 BPM | TEMPERATURE: 98.8 F | BODY MASS INDEX: 24.3 KG/M2 | SYSTOLIC BLOOD PRESSURE: 130 MMHG | OXYGEN SATURATION: 100 % | WEIGHT: 164.1 LBS | HEIGHT: 69 IN

## 2017-06-05 DIAGNOSIS — F41.8 DEPRESSION WITH ANXIETY: Primary | ICD-10-CM

## 2017-06-05 PROBLEM — N61.20 GRANULOMATOUS MASTITIS: Status: ACTIVE | Noted: 2017-06-05

## 2017-06-05 PROCEDURE — 99213 OFFICE O/P EST LOW 20 MIN: CPT | Performed by: INTERNAL MEDICINE

## 2017-06-05 ASSESSMENT — ANXIETY QUESTIONNAIRES
GAD7 TOTAL SCORE: 6
1. FEELING NERVOUS, ANXIOUS, OR ON EDGE: SEVERAL DAYS
7. FEELING AFRAID AS IF SOMETHING AWFUL MIGHT HAPPEN: SEVERAL DAYS
5. BEING SO RESTLESS THAT IT IS HARD TO SIT STILL: SEVERAL DAYS
3. WORRYING TOO MUCH ABOUT DIFFERENT THINGS: SEVERAL DAYS
6. BECOMING EASILY ANNOYED OR IRRITABLE: SEVERAL DAYS
2. NOT BEING ABLE TO STOP OR CONTROL WORRYING: NOT AT ALL

## 2017-06-05 ASSESSMENT — PATIENT HEALTH QUESTIONNAIRE - PHQ9: 5. POOR APPETITE OR OVEREATING: SEVERAL DAYS

## 2017-06-05 NOTE — PROGRESS NOTES
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    Update re: mood:  - patient reports that her mood is significantly improved since last visit  - she thinks this is, in part, due to having a definitive diagnosis for her left breast process  - she no longer feels a sense of overwhelming sadness  - she is no longer close to crying, though she does cry occasionally  - is significantly less irritable than before  - able to tolerate significant personal and work stressors better than before  - thinks Zoloft is also helping to treat her anxiety, which she was actually unaware she had   - is significantly less anxious about her son's well-being    - had struggled with negative anxiety prior to starting Zoloft    PHQ-9 and LAY-7 reviewed - depression well controlled, mild anxiety symptoms.    Patient is not interested in increasing dose of Zoloft at this time - thinks current dose is working well for her.    The medication, allergy, and problem lists have been reviewed and updated as appropriate.       OBJECTIVE:                                                      /88 (BP Location: Left arm, Patient Position: Chair, Cuff Size: Adult Regular)  Pulse 83  Temp 98.8  F (37.1  C) (Oral)  Ht 5' 9\" (1.753 m)  Wt 164 lb 1.6 oz (74.4 kg)  LMP 05/15/2017 (Exact Date)  SpO2 100%  BMI 24.23 kg/m2  Constitutional: well-appearing  Psych: normal judgment and insight; normal mood and affect; recent and remote memory intact      ASSESSMENT/PLAN:                                                      (F41.8) Depression with anxiety  (primary " encounter diagnosis)  Comment:    - depression well controlled with Zoloft 50 mg daily.   - new diagnosis of anxiety, reasonably treated with Zoloft 50 mg daily.   - patient is not interested in increasing dose of Zoloft at this time.  Plan: CPM - refill provided.    Patient encouraged to follow-up later this year for full physical.    The instructions on the AVS were discussed and explained to the patient. Patient expressed understanding of instructions.    Josselin Garcia MD   [address] [address]  T: [phone], F: [phone]

## 2017-06-05 NOTE — NURSING NOTE
"Chief Complaint   Patient presents with     Recheck Medication     On Zoloft. Would like to discuss if symptoms are anxiety or depression.       Initial /88 (BP Location: Left arm, Patient Position: Chair, Cuff Size: Adult Regular)  Pulse 83  Temp 98.8  F (37.1  C) (Oral)  Ht 5' 9\" (1.753 m)  Wt 164 lb 1.6 oz (74.4 kg)  LMP 05/15/2017 (Exact Date)  SpO2 100%  BMI 24.23 kg/m2 Estimated body mass index is 24.23 kg/(m^2) as calculated from the following:    Height as of this encounter: 5' 9\" (1.753 m).    Weight as of this encounter: 164 lb 1.6 oz (74.4 kg).  Medication Reconciliation: complete       Kaminibose MA      "

## 2017-06-05 NOTE — MR AVS SNAPSHOT
After Visit Summary   6/5/2017    Shannen Enamorado    MRN: [id number]           Patient Information     Date Of Birth          1980        Visit Information        Provider Department      6/5/2017 9:45 AM Josselin Garcia MD [facility]        Today's Diagnoses     Moderate episode of recurrent major depressive disorder (H)          Care Instructions    Refill of Zoloft sent to pharmacy.     Come back for physical later this year.           Follow-ups after your visit        Who to contact     If you have questions or need follow up information about today's clinic visit or your schedule please contact [facility] directly at [phone].  Normal or non-critical lab and imaging results will be communicated to you by MyChart, letter or phone within 4 business days after the clinic has received the results. If you do not hear from us within 7 days, please contact the clinic through Videdressing[employer] or phone. If you have a critical or abnormal lab result, we will notify you by phone as soon as possible.  Submit refill requests through [employer] or call your pharmacy and they will forward the refill request to us. Please allow 3 business days for your refill to be completed.          Additional Information About Your Visit        MyChart Information     [employer] gives you secure access to your electronic health record. If you see a primary care provider, you can also send messages to your care team and make appointments. If you have questions, please call your primary care clinic.  If you do not have a primary care provider, please call [phone] and they will assist you.        Care EveryWhere ID     This is your Care EveryWhere ID. This could be used by other organizations to access your [place] medical records  [id number]        Your Vitals Were     Pulse Temperature Height Last Period Pulse Oximetry BMI (Body Mass Index)    83 98.8  F (37.1  C)  "(Oral) 5' 9\" (1.753 m) 05/15/2017 (Exact Date) 100% 24.23 kg/m2       Blood Pressure from Last 3 Encounters:   06/05/17 130/88   03/20/17 (!) 130/98   02/23/17 (!) 153/110    Weight from Last 3 Encounters:   06/05/17 164 lb 1.6 oz (74.4 kg)   03/20/17 175 lb 14.4 oz (79.8 kg)   02/23/17 170 lb (77.1 kg)              Today, you had the following     No orders found for display         Today's Medication Changes          These changes are accurate as of: 6/5/17 10:12 AM.  If you have any questions, ask your nurse or doctor.               These medicines have changed or have updated prescriptions.        Dose/Directions    sertraline 50 MG tablet   Commonly known as:  ZOLOFT   This may have changed:  See the new instructions.   Used for:  Moderate episode of recurrent major depressive disorder (H)   Changed by:  Josselin Garcia MD        Dose:  50 mg   Take 1 tablet (50 mg) by mouth daily   Quantity:  90 tablet   Refills:  3            Where to get your medicines      These medications were sent to [facility]/pharmacy #[id number] [facility] [address] [address]     Phone:  [phone]     sertraline 50 MG tablet                Primary Care Provider Office Phone # Fax #    Swapna Rosas [phone]       [facility] OB GYN CONS[facility] [address] [place] [phone]        Thank you!     Thank you for choosing [facility]  for your care. Our goal is always to provide you with excellent care. Hearing back from our patients is one way we can continue to improve our services. Please take a few minutes to complete the written survey that you may receive in the mail after your visit with us. Thank you!             Your Updated Medication List - Protect others around you: Learn how to safely use, store and throw away your medicines at www.disposemymeds.org.          This list is accurate as of: 6/5/17 10:12 AM.  Always use your most " "recent med list.                   Brand Name Dispense Instructions for use    * CURITY PLAIN PACKING STRIP Misc     1 each    Pack both incisions with packing strip once daily. Cover with 4x4\" gauze pads and tape.       * Gauze Dressing 4\"X4\" Pads     24 each    Use gauze pads to cover incisions after packing with packing strips.       HYDROcodone-acetaminophen 5-325 MG per tablet    NORCO    20 tablet    Take 1-2 tablets by mouth every 4 hours as needed for moderate to severe pain       MEDIPORE SOFT CLOTH TAPE Tape     1 each    Tape over gauze pads once daily to keep gauze and packing in place and prevent drainage from leaking.       NONFORMULARY      Supplements for migraines       sertraline 50 MG tablet    ZOLOFT    90 tablet    Take 1 tablet (50 mg) by mouth daily       triamcinolone 0.5 % cream    KENALOG     Apply topically 2 times daily       * Notice:  This list has 2 medication(s) that are the same as other medications prescribed for you. Read the directions carefully, and ask your doctor or other care provider to review them with you.      "

## 2017-06-06 ASSESSMENT — ANXIETY QUESTIONNAIRES: GAD7 TOTAL SCORE: 6

## 2017-10-31 ENCOUNTER — HOSPITAL ENCOUNTER (OUTPATIENT)
Dept: MAMMOGRAPHY | Facility: CLINIC | Age: 37
Discharge: HOME | End: 2017-10-31
Attending: OBSTETRICS & GYNECOLOGY | Admitting: OBSTETRICS & GYNECOLOGY
Payer: COMMERCIAL

## 2017-10-31 ENCOUNTER — HOSPITAL ENCOUNTER (OUTPATIENT)
Dept: MAMMOGRAPHY | Facility: CLINIC | Age: 37
End: 2017-10-31
Attending: OBSTETRICS & GYNECOLOGY
Payer: COMMERCIAL

## 2017-10-31 DIAGNOSIS — N60.01 BREAST CYST, RIGHT: ICD-10-CM

## 2017-10-31 PROCEDURE — 76642 ULTRASOUND BREAST LIMITED: CPT | Mod: RT

## 2017-10-31 PROCEDURE — G0279 TOMOSYNTHESIS, MAMMO: HCPCS

## 2017-11-01 ENCOUNTER — TRANSFERRED RECORDS (OUTPATIENT)
Dept: HEALTH INFORMATION MANAGEMENT | Facility: CLINIC | Age: 37
End: 2017-11-01

## 2017-11-01 LAB — PAP SMEAR - HIM PATIENT REPORTED: NEGATIVE

## 2017-11-22 ENCOUNTER — OFFICE VISIT (OUTPATIENT)
Dept: INTERNAL MEDICINE | Facility: CLINIC | Age: 37
End: 2017-11-22
Payer: COMMERCIAL

## 2017-11-22 VITALS
SYSTOLIC BLOOD PRESSURE: 128 MMHG | DIASTOLIC BLOOD PRESSURE: 80 MMHG | TEMPERATURE: 98 F | HEART RATE: 74 BPM | HEIGHT: 69 IN | WEIGHT: 174 LBS | BODY MASS INDEX: 25.77 KG/M2 | OXYGEN SATURATION: 100 %

## 2017-11-22 DIAGNOSIS — Z13.220 SCREENING FOR CHOLESTEROL LEVEL: ICD-10-CM

## 2017-11-22 DIAGNOSIS — Z00.00 ROUTINE HISTORY AND PHYSICAL EXAMINATION OF ADULT: Primary | ICD-10-CM

## 2017-11-22 DIAGNOSIS — L30.9 ECZEMA, UNSPECIFIED TYPE: ICD-10-CM

## 2017-11-22 LAB
CHOLEST SERPL-MCNC: 213 MG/DL
HDLC SERPL-MCNC: 76 MG/DL
LDLC SERPL CALC-MCNC: 99 MG/DL
NONHDLC SERPL-MCNC: 137 MG/DL
TRIGL SERPL-MCNC: 188 MG/DL

## 2017-11-22 PROCEDURE — 80061 LIPID PANEL: CPT | Performed by: INTERNAL MEDICINE

## 2017-11-22 PROCEDURE — 36415 COLL VENOUS BLD VENIPUNCTURE: CPT | Performed by: INTERNAL MEDICINE

## 2017-11-22 PROCEDURE — 99395 PREV VISIT EST AGE 18-39: CPT | Performed by: INTERNAL MEDICINE

## 2017-11-22 NOTE — PROGRESS NOTES
SUBJECTIVE:                                                      HPI: Shannen Enamorado is a pleasant 37 year old female who presents for a physical.    Patient requests prescription for Eucrisa (nonsteroidal topical treatment for eczema). Patient's eczema is well controlled with topical steroids, but she would like to try something different. Patient is aware that this medication is not covered by her insurance and may be costly. Patient is also aware that we will not initiate a prior authorization on this medication as she responds to alternative treatments.    ROS:  Constitutional: denies unintentional weight loss or gain; denies fevers, chills, or sweats     Cardiovascular: denies chest pain, palpitations, or edema  Respiratory: denies cough, wheezing, shortness of breath, or dyspnea on exertion  Gastrointestinal: denies nausea, vomiting, constipation, diarrhea, or abdominal pain  Genitourinary: denies urinary frequency, urgency, dysuria, or hematuria  Integumentary: denies rash or pruritus  Musculoskeletal: denies back pain, muscle pain, joint pain, or joint swelling  Neurologic: denies focal weakness, numbness, or tingling  Hematologic/Immunologic: denies history of anemia or blood transfusions  Endocrine: denies heat or cold intolerance; denies polyuria, polydipsia  Psychiatric: denies anxiety; see preventative health below    Past Medical History:   Diagnosis Date     Eczema      Granulomatous mastitis     left breast     Migraine with aura      Past Surgical History:   Procedure Laterality Date     INCISION AND DRAINAGE TRUNK, COMBINED Left 2/3/2017    Procedure: COMBINED INCISION AND DRAINAGE TRUNK;  Surgeon: Roxi Mcdonnell MD;  Location: [facility]     TONSILLECTOMY & ADENOIDECTOMY       Family History   Problem Relation Age of Onset     Hypertension Mother      Hypertension Maternal Grandmother      Type 2 Diabetes Maternal Grandfather      Pacemaker Maternal Grandfather      Type 2 Diabetes Maternal  "Aunt      CANCER Maternal Aunt      gynecologic     Myocardial Infarction No family hx of      CEREBROVASCULAR DISEASE No family hx of      Coronary Artery Disease Early Onset No family hx of      Colon Cancer No family hx of      Breast Cancer No family hx of      Occupational History           Social History Main Topics     Smoking status: Former Smoker     Types: Cigarettes     Smokeless tobacco: Never Used      Comment: social smoking in 20s only     Alcohol use Yes      Comment: 2-3 glasses of wine, several days/week     Drug use: No     Sexual activity: Yes     Partners: Male     Birth control/ protection: IUD      Comment: Mirena placed - November, 2017     Social History Narrative    Engaged (getting  in June, 2018).     Son (4 as of 2017) and step-daughter (19 as of 2017).    [facility] [facility] at [facility].      No Known Allergies     Current Outpatient Prescriptions   Medication Sig     triamcinolone (KENALOG) 0.5 % cream Apply topically 2 times daily     Immunization History   Administered Date(s) Administered     Mantoux 03/20/2017     TDAP Vaccine (Adacel) 10/12/2013     OBJECTIVE:                                                      /80 (BP Location: Left arm, Patient Position: Chair, Cuff Size: Adult Regular)  Pulse 74  Temp 98  F (36.7  C) (Oral)  Ht 5' 9\" (1.753 m)  Wt 174 lb (78.9 kg)  LMP 11/14/2017 (Exact Date)  SpO2 100%  BMI 25.7 kg/m2  Constitutional: well-appearing  Eyes: normal conjunctivae and lids; pupils equal, round, and reactive to light  Ears, Nose, Mouth, and Throat: normal ears and nose; tympanic membranes visualized and normal; normal lips, teeth, and gums; no oropharyngeal lesions or ulcers  Neck: supple and symmetric; no lymphadenopathy; no thyromegaly or masses  Respiratory: normal respiratory effort; clear to auscultation bilaterally  Cardiovascular: regular rate and rhythm; pedal pulses palpable; no edema  Gastrointestinal: soft, non-tender, " non-distended, and bowel sounds present; no organomegaly or masses  Musculoskeletal: normal gait and station  Psych: normal judgment and insight; normal mood and affect; recent and remote memory intact; oriented to time, place, and person    PREVENTATIVE HEALTH                                                      BMI: borderline overweight  Blood pressure: within normal limits   Cervical CA screening: last pap performed November, 2017 (normal - [facility])  Colon CA screening: not medically indicated at this time   Lung CA screening: n/a   Dexa: not medically indicated at this time   Screening HCV: n/a   Screening cholesterol: DUE  Screening diabetes: up to date and within normal limits   STD testing: no risk factors present  Depression screening: PHQ-2 assessment completed and reviewed - no intervention indicated at this time  Alcohol misuse screening: alcohol use reviewed - no intervention indicated at this time  Immunizations: reviewed; flu shot DUE - patient declines    ASSESSMENT/PLAN:                                                       (Z00.00) Routine history and physical examination of adult  (primary encounter diagnosis)  Comment: PMH, PSH, FH, SH, medications, allergies, immunizations, and preventative health measures reviewed.   Plan: see below for plans.    (Z13.220) Screening for cholesterol level  Plan: Fasting lipid profile today.    (L30.9) Eczema, unspecified type  Plan: crisaborole (EUCRISA) 2% ointment prescribed; please do not initiate PA.    The instructions on the AVS were discussed and explained to the patient. Patient expressed understanding of instructions.    (Chart documentation was completed, in part, with [employer] voice-recognition software. Even though reviewed, some grammatical, spelling, and word errors may remain.)    Josselin aGrcia MD   [address] [address]  T: [phone], F: [phone]

## 2017-11-22 NOTE — MR AVS SNAPSHOT
"              After Visit Summary   11/22/2017    Shannen Enamorado    MRN: [id number]           Patient Information     Date Of Birth          1980        Visit Information        Provider Department      11/22/2017 9:15 AM Josselin Garcia MD [facility]        Today's Diagnoses     Eczema, unspecified type    -  1    Screening for cholesterol level          Care Instructions    Cholesterol today.               Follow-ups after your visit        Who to contact     If you have questions or need follow up information about today's clinic visit or your schedule please contact [facility] directly at [phone].  Normal or non-critical lab and imaging results will be communicated to you by MyChart, letter or phone within 4 business days after the clinic has received the results. If you do not hear from us within 7 days, please contact the clinic through [employer] or phone. If you have a critical or abnormal lab result, we will notify you by phone as soon as possible.  Submit refill requests through [employer] or call your pharmacy and they will forward the refill request to us. Please allow 3 business days for your refill to be completed.          Additional Information About Your Visit        MyChart Information     [employer] gives you secure access to your electronic health record. If you see a primary care provider, you can also send messages to your care team and make appointments. If you have questions, please call your primary care clinic.  If you do not have a primary care provider, please call [phone] and they will assist you.        Care EveryWhere ID     This is your Care EveryWhere ID. This could be used by other organizations to access your [place] medical records  [id number]        Your Vitals Were     Pulse Temperature Height Last Period Pulse Oximetry BMI (Body Mass Index)    74 98  F (36.7  C) (Oral) 5' 9\" (1.753 m) 11/14/2017 (Exact Date) " 100% 25.7 kg/m2       Blood Pressure from Last 3 Encounters:   11/22/17 128/80   06/05/17 130/88   03/20/17 (!) 130/98    Weight from Last 3 Encounters:   11/22/17 174 lb (78.9 kg)   06/05/17 164 lb 1.6 oz (74.4 kg)   03/20/17 175 lb 14.4 oz (79.8 kg)              We Performed the Following     Lipid Profile          Today's Medication Changes          These changes are accurate as of: 11/22/17  9:42 AM.  If you have any questions, ask your nurse or doctor.               Start taking these medicines.        Dose/Directions    crisaborole 2 % ointment   Commonly known as:  EUCRISA   Used for:  Eczema, unspecified type   Started by:  Josselin Garcia MD        Apply topically 2 times daily   Quantity:  60 g   Refills:  0            Where to get your medicines      These medications were sent to [facility]/pharmacy #[id number] [facility] [address] [address]     Phone:  [phone]     crisaborole 2 % ointment                Primary Care Provider Office Phone # Fax #    Josselin Garcia [phone]       [address] [facility] [address]        Equal Access to Services     ALEXSANDER LIZARRAGA AH: Hadii aad ku hadasho Soomaali, waaxda luqadaha, qaybta kaalmada adeegyada, waxay idiin hayaan adeeg kharash lajacinta . So [facility] [phone].    ATENCIÓN: Si habla español, tiene a randall disposición servicios gratuitos de asistencia lingüística. Llame al [phone].    We comply with applicable federal civil rights laws and [place] laws. We do not discriminate on the basis of race, color, national origin, age, disability, sex, sexual orientation, or gender identity.            Thank you!     Thank you for choosing [facility]  for your care. Our goal is always to provide you with excellent care. Hearing back from our patients is one way we can continue to improve our services. Please take a few minutes to complete the written survey that you may receive  in the mail after your visit with us. Thank you!             Your Updated Medication List - Protect others around you: Learn how to safely use, store and throw away your medicines at www.disposemymeds.org.          This list is accurate as of: 11/22/17  9:42 AM.  Always use your most recent med list.                   Brand Name Dispense Instructions for use Diagnosis    crisaborole 2 % ointment    EUCRISA    60 g    Apply topically 2 times daily    Eczema, unspecified type       levonorgestrel 20 MCG/24HR IUD    MIRENA     1 each (20 mcg) by Intrauterine route once for 1 dose        triamcinolone 0.5 % cream    KENALOG     Apply topically 2 times daily

## 2019-01-24 ENCOUNTER — HOSPITAL ENCOUNTER (OUTPATIENT)
Dept: MAMMOGRAPHY | Facility: CLINIC | Age: 39
End: 2019-01-24
Attending: INTERNAL MEDICINE
Payer: COMMERCIAL

## 2019-01-24 DIAGNOSIS — N63.10 BREAST MASS, RIGHT: ICD-10-CM

## 2019-01-24 PROCEDURE — 76642 ULTRASOUND BREAST LIMITED: CPT | Mod: RT

## 2019-01-29 ENCOUNTER — HOSPITAL ENCOUNTER (OUTPATIENT)
Dept: MAMMOGRAPHY | Facility: CLINIC | Age: 39
Discharge: HOME | End: 2019-01-29
Attending: INTERNAL MEDICINE | Admitting: INTERNAL MEDICINE
Payer: COMMERCIAL

## 2019-01-29 DIAGNOSIS — N63.10 BREAST MASS, RIGHT: ICD-10-CM

## 2019-01-29 PROCEDURE — 88341 IMHCHEM/IMCYTCHM EA ADD ANTB: CPT | Performed by: OBSTETRICS & GYNECOLOGY

## 2019-01-29 PROCEDURE — 88342 IMHCHEM/IMCYTCHM 1ST ANTB: CPT | Performed by: OBSTETRICS & GYNECOLOGY

## 2019-01-29 PROCEDURE — 88342 IMHCHEM/IMCYTCHM 1ST ANTB: CPT | Mod: 26 | Performed by: OBSTETRICS & GYNECOLOGY

## 2019-01-29 PROCEDURE — 27210206 US BREAST BIOPSY CORE NEEDLE RIGHT

## 2019-01-29 PROCEDURE — 88341 IMHCHEM/IMCYTCHM EA ADD ANTB: CPT | Mod: 26 | Performed by: OBSTETRICS & GYNECOLOGY

## 2019-01-29 PROCEDURE — 25000125 ZZHC RX 250: Performed by: INTERNAL MEDICINE

## 2019-01-29 PROCEDURE — 88305 TISSUE EXAM BY PATHOLOGIST: CPT | Performed by: OBSTETRICS & GYNECOLOGY

## 2019-01-29 PROCEDURE — 88305 TISSUE EXAM BY PATHOLOGIST: CPT | Mod: 26 | Performed by: OBSTETRICS & GYNECOLOGY

## 2019-01-29 RX ADMIN — LIDOCAINE HYDROCHLORIDE 5 ML: 10 INJECTION, SOLUTION INFILTRATION; PERINEURAL at 13:36

## 2019-01-29 RX ADMIN — LIDOCAINE HYDROCHLORIDE 5 ML: 10 INJECTION, SOLUTION INFILTRATION; PERINEURAL at 13:33

## 2019-01-31 ENCOUNTER — TELEPHONE (OUTPATIENT)
Dept: MAMMOGRAPHY | Facility: CLINIC | Age: 39
End: 2019-01-31

## 2019-01-31 LAB — COPATH REPORT: NORMAL

## 2019-01-31 NOTE — TELEPHONE ENCOUNTER
After review by Breast Center Radiologist, Dr. 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     Biopsy Follow up Recommendations are Surgical Consultation.  I offered to assist patient in getting scheduled to meet with Dr. Roxi Mcdonnell, who she has seen in the past for granulomatous mastitis and breast abscess, but patient declined and asked if I could inform Dr. Mcdonnell of her results and have her give feedback on her recommendations, and whether she needs a surgical consultation.  I informed patient I will send a message to Dr. Mcdonnell today and once I hear back from her, I can call patient to inform.  Patient verbalized understanding and agrees with the plan of care.     Shannen denies any post breast biopsy site issues. I encouraged her to notify her doctor if any breast changes or concerns arise.  I will forward this message to Dr. Mcdonnell and Dr. Sha Antonio.   Alia Lora, RN, BSN

## 2019-01-31 NOTE — PROGRESS NOTES
After review by Breast Center Radiologist, Dr. 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      Biopsy Follow up Recommendations are Surgical Consultation.     Patient is scheduled to see Dr. Roxi Mcdonnell for surgical consultation on 2/1/2019 @ 12:00 p.m. At [facility].     Patient denies any issues with her biopsy site.  I will forward this note to her PCP/ordering provider- Dr. Sha Antonio.  Patient verbalized understanding and agrees with the plan of care.  Alia Lora, RN, BSN

## 2019-01-31 NOTE — TELEPHONE ENCOUNTER
I discussed patient's pathology results with Dr. Mcdonnell, who is recommending patient come in for a surgical consultation, and offered to see her tomorrow(2/1/19) @ 12:00p.m. At the [facility] office.    I called patient back to inform of Dr. Mcdonnell's recommendations for patient to come in for clinic visit to discuss the pathology findings and the treatment plan.  Patient verbalized understanding and agrees with the plan.      Patient is now scheduled to see Dr. Mcdonnell on 2/1/2019 @ 12:00p.m. At [facility].  Alia Lora RN, BSN

## 2019-02-01 ENCOUNTER — CARE COORDINATION (OUTPATIENT)
Dept: SURGERY | Facility: CLINIC | Age: 39
End: 2019-02-01

## 2019-02-01 ENCOUNTER — OFFICE VISIT (OUTPATIENT)
Dept: SURGERY | Facility: CLINIC | Age: 39
End: 2019-02-01
Payer: COMMERCIAL

## 2019-02-01 VITALS
BODY MASS INDEX: 26.96 KG/M2 | WEIGHT: 182 LBS | DIASTOLIC BLOOD PRESSURE: 86 MMHG | SYSTOLIC BLOOD PRESSURE: 130 MMHG | HEART RATE: 82 BPM | HEIGHT: 69 IN

## 2019-02-01 DIAGNOSIS — D48.119 DESMOID FIBROMATOSIS: Primary | ICD-10-CM

## 2019-02-01 PROCEDURE — 99214 OFFICE O/P EST MOD 30 MIN: CPT | Performed by: SURGERY

## 2019-02-01 ASSESSMENT — MIFFLIN-ST. JEOR: SCORE: 1559.93

## 2019-02-01 NOTE — PROGRESS NOTES
[facility] Follow Up Note    CHIEF COMPLAINT:      HISTORY OF PRESENT ILLNESS:  Shannen Frank is a 38 year old female who is seen in follow up. She has a history of left breast granulomatous mastitis and I had treated her for this for an extended period. This has resolved. She is here today as she has a new mass on her right chest wall/breast.     She reports she first noticed a hard spot next to her sternum in September 2016. She thought she may of dislocated a rib as her cat had recently had that problem. There was no pain at that time. She began working out more aggressively in March 2018 prior to her wedding and had pain at the spot with upper body workouts. She stopped upper body workouts and it resolved. In September 2018 she began exercising again and felt like the lump had increased in size. In October 2018 she took some prednisone and it felt better. She saw her OB/GYN, Dr. Antonio on 1/4/2019 and had a CT chest completed which revealed a 3.5cm mass in the medial right breast arising from the chest wall which may invade the chest wall.  She then had a breast ultrasound which revealed a 5.1cm irregular hypoechoic mass which extends from the chest wall to the skin at 3:00, 11cm FN. This was biopsied and pathology revealed a spindle cell lesion consistent with fibromatosis.     She reports some shortness of breath after heavy exertion which is new (climbing stairs). She has pain at the site with any upper body activity. As a side note, she does c/o of some dysphagia if she is eating food very quickly and not chewing well - it feels as though it gets stuck in her mid esophagus. She drinks water and it resolves.  If she chews well and eats slowly she has no issues.     She has family history of a maternal aunt with ovarian cancer. No one with desmoid tumors, colon cancer, etc.        Pathology:   SPECIMEN(S):   Right ultrasound guided breast needle biopsy, 3:00 11.0cm from nipple     FINAL  DIAGNOSIS:   Breast, right, 3:00, 11.0 cm from nipple, 5.1 cm size, ultrasound guided   core biopsy: Spindle cell lesion   consistent with fibromatosis.  See comment.     COMMENT: This sampling indicates a spindle cell proliferation and is   favored to represent desmoid-type   fibromatosis.  Other entities considered which were deemed less likely   included a partially sampled stroma   component of phyllodes tumor or myofibroblastoma.  Nodular fasciitis was   also considered but is not supported   by the positive beta catenin staining.  There is no overt malignancy in   this sampling, although this core   biopsy may not be entirely representative of a 5.1 cm lesion.  Further   characterization may be performed on an   excision specimen (if clinically indicated).     REVIEW OF SYSTEMS:  Constitutional:  Negative for chills, fatigue, fever and weight change.  Eyes:  Negative for blurred vision, eye pain and photophobia.  ENT:  Negative for hearing problems, ENT pain, congestion, rhinorrhea, epistaxis, hoarseness and dental problems.  Cardiovascular:  Negative for chest pain, palpitations, tachycardia, orthopnea and edema.  Respiratory:  Negative for cough, dyspnea and hemoptysis.  Gastrointestinal:  Negative for abdominal pain, heartburn, constipation, diarrhea and stool changes.  Musculoskeletal:  Negative for arthralgias, back pain and myalgias.  Integumentary/Breast:  See HPI.    Past Medical History:   Diagnosis Date     Eczema      Granulomatous mastitis     left breast     Migraine with aura        Past Surgical History:   Procedure Laterality Date     INCISION AND DRAINAGE TRUNK, COMBINED Left 2/3/2017    Procedure: COMBINED INCISION AND DRAINAGE TRUNK;  Surgeon: Roxi Mcdonnell MD;  Location: [facility] OR     TONSILLECTOMY & ADENOIDECTOMY         Family History   Problem Relation Age of Onset     Hypertension Mother      Hypertension Maternal Grandmother      Diabetes Type 2  Maternal Grandfather      Pacemaker  "Maternal Grandfather      Diabetes Type 2  Maternal Aunt      Cancer Maternal Aunt         gynecologic     Myocardial Infarction No family hx of      Cerebrovascular Disease No family hx of      Coronary Artery Disease Early Onset No family hx of      Colon Cancer No family hx of      Breast Cancer No family hx of        Social History     Tobacco Use     Smoking status: Former Smoker     Types: Cigarettes     Smokeless tobacco: Never Used     Tobacco comment: social smoking in 20s only   Substance Use Topics     Alcohol use: Yes     Comment: 2-3 glasses of wine, several days/week       Patient Active Problem List   Diagnosis     Granulomatous mastitis     Migraine with aura     Eczema     No Known Allergies  Current Outpatient Medications   Medication Sig Dispense Refill     levonorgestrel (MIRENA) 20 MCG/24HR IUD 1 each (20 mcg) by Intrauterine route once for 1 dose  0     Loratadine (CLARITIN PO)        triamcinolone (KENALOG) 0.5 % cream Apply topically 2 times daily       Vitals: /86   Pulse 82   Ht 1.753 m (5' 9\")   Wt 82.6 kg (182 lb)   BMI 26.88 kg/m    BMI= Body mass index is 26.88 kg/m .    EXAM:  GENERAL: healthy, alert and no distress   BREAST:  There is a visible mass on the right which is on the lateral aspect of the sternum, it is not mobile and feels to be 5cm in greatest dimension. there is overlying ecchymosis present. It is tender to palpation. The breasts otherwise appear symmetric with no overlying skin changes.  There are multiple well healed scars on the left breast c/w prior I&Ds. The nipples are normal bilaterally.  There is no dimpling or thickening of the skin..  There is no axillary or supraclavicular lymphadenopathy.  CARDIOVASCULAR:  RRR  RESPIRATORY: nonlabored breathing  NECK: Neck supple. No adenopathy. Thyroid symmetric, normal size,, Carotids without bruits.  SKIN: No suspicious lesions or rashes  LYMPH: Normal cervical lymph nodes      ASSESSMENT/PLAN:  Shannen ERSTREPO" Zac is a 38yof with h/o left breast granulomatosis who presents with a right chest wall mass consistent with fibromatosis on biopsy. Fibromatosis is a type of desmoid tumor which typically is not malignant but can be locally destructive and recurrence rates are high without R0 resection. I have discussed her care with Dr. English with thoracic surgery and would like Shannen to see him to further discuss surgical resection as I am concerned there is chest wall involvement.     Roxi Mcdonnell MD  [facility]  [phone]        Please route or send letter to:  Primary Care Provider (PCP) and Referring Provider

## 2019-02-01 NOTE — NURSING NOTE
Breast Patients    BREAST PATIENTS (ALL)    1-Do you have any of the following symptoms? Lump(s) or Mass(es)  2-In which breast are you having the symptoms? right  3-Do you use hormones?  No  4-Have you had a Mammogram? Yes  Where: [facility]  Date: 10/31/17  5-Have you ever had a breast cyst drained? Yes  Side: Left  Date: 1/2017  6-Have you ever had a breast biopsy? Yes  Side: Right  Date: 1/29/19  7-Have you ever had a Breast Cancer? No   8-Is there a history of Breast Cancer in your family? No  9-Have you ever had Ovarian Cancer? No  10-Is there a history of Ovarian Cancer in your family? No  11-Summarize your caffeine intake (i.e. coffee, tea, chocolate, soda etc.): 1-2 cups of coffee per day     BREAST PATIENTS (FEMALE)    12-What age did your periods begin? 9  13-Date your last menstrual period began? Does not have with IUD   14-Number of full-term pregnancies: 1  15-Your age when your first child was born? 33  16-Did you nurse your children? Yes  17-Are you pregnant now? No  18-Have you begun menopause? No  19-Have you had either ovary removed?No  20-Do you have breast implants? No     Dorie Khalil MA

## 2019-02-04 ENCOUNTER — TRANSFERRED RECORDS (OUTPATIENT)
Dept: HEALTH INFORMATION MANAGEMENT | Facility: CLINIC | Age: 39
End: 2019-02-04

## 2019-02-04 NOTE — PROGRESS NOTES
Introduced self to patient at request of Dr. Mcdonnell. Explained my role as oncology care coordinate to Shannen. Accompanied Shannen to her surgical consultation today with Dr. Mcdonnell.      At the end of the consultation, we reviewed plan of care and education. Shannen with meet with Dr. John English, thoracic surgery, on 2/4/2019. Will f/u with Shannen after consult.     Shannen has my contact information and knows to contact me in the future with any questions/concerns.     Mariam VILLAFUERTEN, RN, OCN  Oncology Care Coordinator  [facility]  [phone]

## 2019-02-12 DIAGNOSIS — R01.1 HEART MURMUR: ICD-10-CM

## 2019-02-12 DIAGNOSIS — Z01.818 PRE-OP EXAM: Primary | ICD-10-CM

## 2019-02-14 ENCOUNTER — HOSPITAL ENCOUNTER (OUTPATIENT)
Dept: CARDIOLOGY | Facility: CLINIC | Age: 39
Discharge: HOME | End: 2019-02-14
Attending: OBSTETRICS & GYNECOLOGY | Admitting: OBSTETRICS & GYNECOLOGY
Payer: COMMERCIAL

## 2019-02-14 DIAGNOSIS — Z01.818 PRE-OP EXAM: ICD-10-CM

## 2019-02-14 DIAGNOSIS — R01.1 HEART MURMUR: ICD-10-CM

## 2019-02-14 PROCEDURE — 25500064 ZZH RX 255 OP 636: Performed by: OBSTETRICS & GYNECOLOGY

## 2019-02-14 PROCEDURE — 93306 TTE W/DOPPLER COMPLETE: CPT | Mod: 26 | Performed by: INTERNAL MEDICINE

## 2019-02-14 PROCEDURE — 40000264 ECHOCARDIOGRAM COMPLETE

## 2019-02-14 RX ADMIN — HUMAN ALBUMIN MICROSPHERES AND PERFLUTREN 9 ML: 10; .22 INJECTION, SOLUTION INTRAVENOUS at 09:52

## 2019-02-15 ENCOUNTER — OFFICE VISIT (OUTPATIENT)
Dept: CARDIOLOGY | Facility: CLINIC | Age: 39
End: 2019-02-15
Payer: COMMERCIAL

## 2019-02-15 VITALS
SYSTOLIC BLOOD PRESSURE: 141 MMHG | WEIGHT: 188.1 LBS | HEIGHT: 69 IN | BODY MASS INDEX: 27.86 KG/M2 | HEART RATE: 86 BPM | DIASTOLIC BLOOD PRESSURE: 94 MMHG

## 2019-02-15 DIAGNOSIS — R01.1 HEART MURMUR: Primary | ICD-10-CM

## 2019-02-15 PROCEDURE — 99204 OFFICE O/P NEW MOD 45 MIN: CPT | Mod: 25 | Performed by: INTERNAL MEDICINE

## 2019-02-15 PROCEDURE — 93000 ELECTROCARDIOGRAM COMPLETE: CPT | Performed by: INTERNAL MEDICINE

## 2019-02-15 SDOH — HEALTH STABILITY: MENTAL HEALTH: HOW OFTEN DO YOU HAVE A DRINK CONTAINING ALCOHOL?: 2-3 TIMES A WEEK

## 2019-02-15 SDOH — HEALTH STABILITY: MENTAL HEALTH: HOW MANY STANDARD DRINKS CONTAINING ALCOHOL DO YOU HAVE ON A TYPICAL DAY?: 1 OR 2

## 2019-02-15 ASSESSMENT — MIFFLIN-ST. JEOR: SCORE: 1587.6

## 2019-02-15 NOTE — PROGRESS NOTES
Service Date: 02/15/2019      HISTORY OF PRESENT ILLNESS:  It is a pleasure for me to see this very pleasant 38-year-old lady at the request of her OB physician for evaluation of a cardiac murmur.  This lady is scheduled for a desmoid cyst removal from her right chest wall and on preop physical, a murmur was detected.      This lady was told of a murmur when she was a child.  Nobody has heard a cardiac murmur until most recently.  When she was a teenager, she has had an episode of chest pain thought due to be costochondritis.  She used to exercise on a regular basis and that she has no symptoms of exertional chest discomfort, unusual shortness of breath, exertional dizzy spells or syncopal episodes.  There is no history of drug, alcohol or illicit drug use.      FAMILY HISTORY:  Completely negative for cardiac disease or sudden death.  Her 5-year-old son has what she described as a Still murmur which I suspect to be innocent.      When I initially auscultated, I did not hear much of a murmur but with Valsalva, a 2/6 systolic murmur was clearly audible at the left sternal border.  This would certainly correspond with her very interesting echocardiographic findings.  She has asymmetric left ventricular hypertrophy.  However, the septal thickness is only 13 mm.  No significant LVOT obstruction was detected at echocardiography.  She has mild mitral regurgitation due to a systolic anterior motion of the mitral valve leaflet and there is mild tricuspid regurgitation.      Her EKG is normal.  I do notice that her blood pressure is a little elevated in our office today.  She tells me that outside of physician visits, her blood pressure is normal.  However, she had what sounded like pregnancy-induced hypertension.      IMPRESSION:   1.  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   2.  Hypertrophic cardiomyopathy, suspected.   3.  Probable white coat hypertension.      This delightful young lady has clinical features of  hypertrophic cardiomyopathy.  However, I am very reassured to find that she does not have any of the high-risk features associated with this condition.  She is completely asymptomatic and there is no family history of sudden cardiac death.  The septum is only mildly thickened.  EKG is normal.      I think she would be absolutely fine to have her upcoming surgery for the desmoid cyst.  I would like to investigate her cardiac condition further after she has the operation.  I will schedule a cardiac MR.  I will also obtain a 24-hour Holter monitor to exclude the rare possibility of cardiac arrhythmia.      I wished her well for her upcoming surgery and look forward to seeing her in 2-3 months' time.         ANNY TREJO MD, [facility]             D: 02/15/2019   T: 02/15/2019   MT: NICHOL      Name:     HARVEY AMADOR   MRN:      [id number]        Account:      [id number]   :      1980           Service Date: 02/15/2019      Document: [id number]

## 2019-02-15 NOTE — LETTER
2/15/2019      Sha Antonio MD  [facility] [address]  [facility] [id number]      RE: Shannen Frank       Dear Colleague,    I had the pleasure of seeing Shannen Frank in the [facility].    Service Date: 02/15/2019      HISTORY OF PRESENT ILLNESS:  It is a pleasure for me to see this very pleasant 38-year-old lady at the request of her OB physician for evaluation of a cardiac murmur.  This lady is scheduled for a desmoid cyst removal from her right chest wall and on preop physical, a murmur was detected.      This lady was told of a murmur when she was a child.  Nobody has heard a cardiac murmur until most recently.  When she was a teenager, she has had an episode of chest pain thought due to be costochondritis.  She used to exercise on a regular basis and that she has no symptoms of exertional chest discomfort, unusual shortness of breath, exertional dizzy spells or syncopal episodes.  There is no history of drug, alcohol or illicit drug use.      FAMILY HISTORY:  Completely negative for cardiac disease or sudden death.  Her 5-year-old son has what she described as a Still murmur which I suspect to be innocent.      When I initially auscultated, I did not hear much of a murmur but with Valsalva, a 2/6 systolic murmur was clearly audible at the left sternal border.  This would certainly correspond with her very interesting echocardiographic findings.  She has asymmetric left ventricular hypertrophy.  However, the septal thickness is only 13 mm.  No significant LVOT obstruction was detected at echocardiography.  She has mild mitral regurgitation due to a systolic anterior motion of the mitral valve leaflet and there is mild tricuspid regurgitation.      Her EKG is normal.  I do notice that her blood pressure is a little elevated in our office today.  She tells me that outside of physician visits, her blood pressure is normal.  However, she had  what sounded like pregnancy-induced hypertension.      IMPRESSION:   1.  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   2.  Hypertrophic cardiomyopathy, suspected.   3.  Probable white coat hypertension.      This delightful young lady has clinical features of hypertrophic cardiomyopathy.  However, I am very reassured to find that she does not have any of the high-risk features associated with this condition.  She is completely asymptomatic and there is no family history of sudden cardiac death.  The septum is only mildly thickened.  EKG is normal.      I think she would be absolutely fine to have her upcoming surgery for the desmoid cyst.  I would like to investigate her cardiac condition further after she has the operation.  I will schedule a cardiac MR.  I will also obtain a 24-hour Holter monitor to exclude the rare possibility of cardiac arrhythmia.      I wished her well for her upcoming surgery and look forward to seeing her in 2-3 months' time.         ANNY TREJO MD, [facility]             D: 02/15/2019   T: 02/15/2019   MT: NICHOL      Name:     HARVEY AMADOR   MRN:      [id number]        Account:      [id number]   :      1980           Service Date: 02/15/2019      Document: [id number]         Outpatient Encounter Medications as of 2/15/2019   Medication Sig Dispense Refill     Dextromethorphan-Guaifenesin (MUCINEX DM PO) Take 1 tablet by mouth every 12 hours        levonorgestrel (MIRENA) 20 MCG/24HR IUD 1 each (20 mcg) by Intrauterine route once for 1 dose  0     Loratadine (CLARITIN PO) Take 1 tablet by mouth every evening        Pseudoephedrine HCl (SUDAFED CONGESTION PO) Take 1 tablet by mouth daily        triamcinolone (KENALOG) 0.5 % cream Apply topically 2 times daily PRN Eczema       [DISCONTINUED] fentaNYL (SUBLIMAZE) injection        [DISCONTINUED] ROPivacaine (NAROPIN) epidural        No facility-administered encounter medications on file as of 2/15/2019.        Again, thank you for  allowing me to participate in the care of your patient.      Sincerely,    DR ANNY TREJO MD     [facility]

## 2019-02-15 NOTE — H&P (VIEW-ONLY)
Service Date: 02/15/2019      HISTORY OF PRESENT ILLNESS:  It is a pleasure for me to see this very pleasant 38-year-old lady at the request of her OB physician for evaluation of a cardiac murmur.  This lady is scheduled for a desmoid cyst removal from her right chest wall and on preop physical, a murmur was detected.      This lady was told of a murmur when she was a child.  Nobody has heard a cardiac murmur until most recently.  When she was a teenager, she has had an episode of chest pain thought due to be costochondritis.  She used to exercise on a regular basis and that she has no symptoms of exertional chest discomfort, unusual shortness of breath, exertional dizzy spells or syncopal episodes.  There is no history of drug, alcohol or illicit drug use.      FAMILY HISTORY:  Completely negative for cardiac disease or sudden death.  Her 5-year-old son has what she described as a Still murmur which I suspect to be innocent.      When I initially auscultated, I did not hear much of a murmur but with Valsalva, a 2/6 systolic murmur was clearly audible at the left sternal border.  This would certainly correspond with her very interesting echocardiographic findings.  She has asymmetric left ventricular hypertrophy.  However, the septal thickness is only 13 mm.  No significant LVOT obstruction was detected at echocardiography.  She has mild mitral regurgitation due to a systolic anterior motion of the mitral valve leaflet and there is mild tricuspid regurgitation.      Her EKG is normal.  I do notice that her blood pressure is a little elevated in our office today.  She tells me that outside of physician visits, her blood pressure is normal.  However, she had what sounded like pregnancy-induced hypertension.      IMPRESSION:   1.  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   2.  Hypertrophic cardiomyopathy, suspected.   3.  Probable white coat hypertension.      This delightful young lady has clinical features of  hypertrophic cardiomyopathy.  However, I am very reassured to find that she does not have any of the high-risk features associated with this condition.  She is completely asymptomatic and there is no family history of sudden cardiac death.  The septum is only mildly thickened.  EKG is normal.      I think she would be absolutely fine to have her upcoming surgery for the desmoid cyst.  I would like to investigate her cardiac condition further after she has the operation.  I will schedule a cardiac MR.  I will also obtain a 24-hour Holter monitor to exclude the rare possibility of cardiac arrhythmia.      I wished her well for her upcoming surgery and look forward to seeing her in 2-3 months' time.         ANNY TREJO MD, [facility]             D: 02/15/2019   T: 02/15/2019   MT: NICHOL      Name:     HARVEY AMADOR   MRN:      [id number]        Account:      [id number]   :      1980           Service Date: 02/15/2019      Document: [id number]

## 2019-02-15 NOTE — PROGRESS NOTES
HPI and Plan:   See dictation    Orders Placed This Encounter   Procedures     MRI Cardiac w/contrast     Follow-Up with Cardiologist     EKG 12-lead complete w/read - Clinics (performed today)     Holter Monitor 24 hour Adult Pediatric       Orders Placed This Encounter   Medications     Dextromethorphan-Guaifenesin (MUCINEX DM PO)     Sig: Take by mouth daily     Pseudoephedrine HCl (SUDAFED CONGESTION PO)     Sig: Take by mouth daily       Encounter Diagnosis   Name Primary?     Heart murmur Yes       CURRENT MEDICATIONS:  Current Outpatient Medications   Medication Sig Dispense Refill     Dextromethorphan-Guaifenesin (MUCINEX DM PO) Take by mouth daily       levonorgestrel (MIRENA) 20 MCG/24HR IUD 1 each (20 mcg) by Intrauterine route once for 1 dose  0     Loratadine (CLARITIN PO) Take by mouth daily as needed        Pseudoephedrine HCl (SUDAFED CONGESTION PO) Take by mouth daily       triamcinolone (KENALOG) 0.5 % cream Apply topically 2 times daily PRN         ALLERGIES   No Known Allergies    PAST MEDICAL HISTORY:  Past Medical History:   Diagnosis Date     Eczema      Granulomatous mastitis     left breast     Migraine with aura        PAST SURGICAL HISTORY:  Past Surgical History:   Procedure Laterality Date     INCISION AND DRAINAGE TRUNK, COMBINED Left 2/3/2017    Procedure: COMBINED INCISION AND DRAINAGE TRUNK;  Surgeon: Roxi Mcdonnell MD;  Location: [facility] OR     TONSILLECTOMY & ADENOIDECTOMY         FAMILY HISTORY:  Family History   Problem Relation Age of Onset     Hypertension Mother      Hypertension Maternal Grandmother      Diabetes Type 2  Maternal Grandfather      Pacemaker Maternal Grandfather      Diabetes Type 2  Maternal Aunt      Cancer Maternal Aunt         gynecologic     Myocardial Infarction No family hx of      Cerebrovascular Disease No family hx of      Coronary Artery Disease Early Onset No family hx of      Colon Cancer No family hx of      Breast Cancer No family hx of   "      SOCIAL HISTORY:  Social History     Socioeconomic History     Marital status:      Spouse name: None     Number of children: None     Years of education: None     Highest education level: None   Social Needs     Financial resource strain: None     Food insecurity - worry: None     Food insecurity - inability: None     Transportation needs - medical: None     Transportation needs - non-medical: None   Occupational History     Occupation:    Tobacco Use     Smoking status: Former Smoker     Types: Cigarettes     Smokeless tobacco: Never Used     Tobacco comment: social smoking in 20s only   Substance and Sexual Activity     Alcohol use: Yes     Frequency: 2-3 times a week     Drinks per session: 1 or 2     Comment: 2-3 glasses of wine, several days/week     Drug use: No     Sexual activity: Yes     Partners: Male     Birth control/protection: IUD     Comment: Mirena placed - November, 2017   Other Topics Concern     Parent/sibling w/ CABG, MI or angioplasty before 65F 55M? Not Asked   Social History Narrative    Engaged (getting  in June, 2018).     Son (4 as of 2017) and step-daughter (19 as of 2017).    [facility]es to [employer] at least 2x/[facility].        Review of Systems:  Skin:  Negative     Eyes:  Negative    ENT:  Positive for postnasal drainage  Respiratory:  Positive for cough  Cardiovascular:  Negative;palpitations;edema;syncope or near-syncope;lightheadedness;dizziness;cyanosis;fatigue Positive for;exercise intolerance  Gastroenterology: Positive for heartburn  Genitourinary:  Negative    Musculoskeletal:  Negative    Neurologic:  Positive for migraine headaches  Psychiatric:  Positive for anxiety  Heme/Lymph/Imm:  Negative    Endocrine:  Negative      Physical Exam:  Vitals: BP (!) 141/94 (BP Location: Left arm, Cuff Size: Adult Large)   Pulse 86   Ht 1.753 m (5' 9\")   Wt 85.3 kg (188 lb 1.6 oz)   BMI 27.78 kg/m      Constitutional:  cooperative, alert and oriented, well developed, well " nourished, in no acute distress        Skin:  warm and dry to the touch, no apparent skin lesions or masses noted   pacemaker incision in the left infraclavicular area was well-healed      Head:  normocephalic, no masses or lesions        Eyes:  pupils equal and round, conjunctivae and lids unremarkable, sclera white, no xanthalasma, EOMS intact, no nystagmus        Lymph:No Cervical lymphadenopathy present     ENT:  no pallor or cyanosis, dentition good        Neck:  carotid pulses are full and equal bilaterally, JVP normal, no carotid bruit        Respiratory:  normal breath sounds, clear to auscultation, normal A-P diameter, normal symmetry, normal respiratory excursion, no use of accessory muscles         Cardiac: regular rhythm;normal S1 and S2;apical impulse not displaced       systolic murmur;LLSB        pulses full and equal, no bruits auscultated                                        GI:  abdomen soft, non-tender, BS normoactive, no mass, no HSM, no bruits        Extremities and Muscular Skeletal:  no deformities, clubbing, cyanosis, erythema observed              Neurological:  no gross motor deficits        Psych:  Alert and Oriented x 3        Recent Lab Results:  LIPID RESULTS:  Lab Results   Component Value Date    CHOL 213 (H) 11/22/2017    HDL 76 11/22/2017    LDL 99 11/22/2017    TRIG 188 (H) 11/22/2017       LIVER ENZYME RESULTS:  Lab Results   Component Value Date    AST 8 03/20/2017    ALT 21 03/20/2017       CBC RESULTS:  Lab Results   Component Value Date    WBC 12.9 (H) 03/20/2017    RBC 4.06 03/20/2017    HGB 12.0 03/20/2017    HCT 38.2 03/20/2017    MCV 94 03/20/2017    MCH 29.6 03/20/2017    MCHC 31.4 (L) 03/20/2017    RDW 12.2 03/20/2017     03/20/2017       BMP RESULTS:  Lab Results   Component Value Date     03/20/2017    POTASSIUM 4.3 03/20/2017    CHLORIDE 104 03/20/2017    CO2 30 03/20/2017    ANIONGAP 6 03/20/2017    GLC 89 03/20/2017    BUN 10 03/20/2017    CR 0.68  03/20/2017    GFRESTIMATED >90  Non  GFR Calc   03/20/2017    GFRESTBLACK >90   GFR Calc   03/20/2017    GLORY 9.3 03/20/2017        A1C RESULTS:  No results found for: A1C    INR RESULTS:  No results found for: INR        CC  No referring provider defined for this encounter.

## 2019-02-15 NOTE — LETTER
2/15/2019    MD Marek Brady [facility] Consults [address]  [facility] [address]    RE: Shannen Frank       Dear Colleague,    I had the pleasure of seeing Shannen Frank in the [facility].    HPI and Plan:   See dictation    Orders Placed This Encounter   Procedures     MRI Cardiac w/contrast     Follow-Up with Cardiologist     EKG 12-lead complete w/read - Clinics (performed today)     Holter Monitor 24 hour Adult Pediatric       Orders Placed This Encounter   Medications     Dextromethorphan-Guaifenesin (MUCINEX DM PO)     Sig: Take by mouth daily     Pseudoephedrine HCl (SUDAFED CONGESTION PO)     Sig: Take by mouth daily       Encounter Diagnosis   Name Primary?     Heart murmur Yes       CURRENT MEDICATIONS:  Current Outpatient Medications   Medication Sig Dispense Refill     Dextromethorphan-Guaifenesin (MUCINEX DM PO) Take by mouth daily       levonorgestrel (MIRENA) 20 MCG/24HR IUD 1 each (20 mcg) by Intrauterine route once for 1 dose  0     Loratadine (CLARITIN PO) Take by mouth daily as needed        Pseudoephedrine HCl (SUDAFED CONGESTION PO) Take by mouth daily       triamcinolone (KENALOG) 0.5 % cream Apply topically 2 times daily PRN         ALLERGIES   No Known Allergies    PAST MEDICAL HISTORY:  Past Medical History:   Diagnosis Date     Eczema      Granulomatous mastitis     left breast     Migraine with aura        PAST SURGICAL HISTORY:  Past Surgical History:   Procedure Laterality Date     INCISION AND DRAINAGE TRUNK, COMBINED Left 2/3/2017    Procedure: COMBINED INCISION AND DRAINAGE TRUNK;  Surgeon: Roxi Mcdonnell MD;  Location:  OR     TONSILLECTOMY & ADENOIDECTOMY         FAMILY HISTORY:  Family History   Problem Relation Age of Onset     Hypertension Mother      Hypertension Maternal Grandmother      Diabetes Type 2  Maternal Grandfather      Pacemaker Maternal Grandfather      Diabetes Type 2  Maternal Aunt      Cancer  Maternal Aunt         gynecologic     Myocardial Infarction No family hx of      Cerebrovascular Disease No family hx of      Coronary Artery Disease Early Onset No family hx of      Colon Cancer No family hx of      Breast Cancer No family hx of        SOCIAL HISTORY:  Social History     Socioeconomic History     Marital status:      Spouse name: None     Number of children: None     Years of education: None     Highest education level: None   Social Needs     Financial resource strain: None     Food insecurity - worry: None     Food insecurity - inability: None     Transportation needs - medical: None     Transportation needs - non-medical: None   Occupational History     Occupation:    Tobacco Use     Smoking status: Former Smoker     Types: Cigarettes     Smokeless tobacco: Never Used     Tobacco comment: social smoking in 20s only   Substance and Sexual Activity     Alcohol use: Yes     Frequency: 2-3 times a week     Drinks per session: 1 or 2     Comment: 2-3 glasses of wine, several days/week     Drug use: No     Sexual activity: Yes     Partners: Male     Birth control/protection: IUD     Comment: Mirena placed - November, 2017   Other Topics Concern     Parent/sibling w/ CABG, MI or angioplasty before 65F 55M? Not Asked   Social History Narrative    Engaged (getting  in June, 2018).     Son (4 as of 2017) and step-daughter (19 as of 2017).    Goes to [facility] at least 2x/week.        Review of Systems:  Skin:  Negative     Eyes:  Negative    ENT:  Positive for postnasal drainage  Respiratory:  Positive for cough  Cardiovascular:  Negative;palpitations;edema;syncope or near-syncope;lightheadedness;dizziness;cyanosis;fatigue Positive for;exercise intolerance  Gastroenterology: Positive for heartburn  Genitourinary:  Negative    Musculoskeletal:  Negative    Neurologic:  Positive for migraine headaches  Psychiatric:  Positive for anxiety  Heme/Lymph/Imm:  Negative    Endocrine:  Negative   "    Physical Exam:  Vitals: BP (!) 141/94 (BP Location: Left arm, Cuff Size: Adult Large)   Pulse 86   Ht 1.753 m (5' 9\")   Wt 85.3 kg (188 lb 1.6 oz)   BMI 27.78 kg/m       Constitutional:  cooperative, alert and oriented, well developed, well nourished, in no acute distress        Skin:  warm and dry to the touch, no apparent skin lesions or masses noted   pacemaker incision in the left infraclavicular area was well-healed      Head:  normocephalic, no masses or lesions        Eyes:  pupils equal and round, conjunctivae and lids unremarkable, sclera white, no xanthalasma, EOMS intact, no nystagmus        Lymph:No Cervical lymphadenopathy present     ENT:  no pallor or cyanosis, dentition good        Neck:  carotid pulses are full and equal bilaterally, JVP normal, no carotid bruit        Respiratory:  normal breath sounds, clear to auscultation, normal A-P diameter, normal symmetry, normal respiratory excursion, no use of accessory muscles         Cardiac: regular rhythm;normal S1 and S2;apical impulse not displaced       systolic murmur;LLSB        pulses full and equal, no bruits auscultated                                        GI:  abdomen soft, non-tender, BS normoactive, no mass, no HSM, no bruits        Extremities and Muscular Skeletal:  no deformities, clubbing, cyanosis, erythema observed              Neurological:  no gross motor deficits        Psych:  Alert and Oriented x 3        Recent Lab Results:  LIPID RESULTS:  Lab Results   Component Value Date    CHOL 213 (H) 11/22/2017    HDL 76 11/22/2017    LDL 99 11/22/2017    TRIG 188 (H) 11/22/2017       LIVER ENZYME RESULTS:  Lab Results   Component Value Date    AST 8 03/20/2017    ALT 21 03/20/2017       CBC RESULTS:  Lab Results   Component Value Date    WBC 12.9 (H) 03/20/2017    RBC 4.06 03/20/2017    HGB 12.0 03/20/2017    HCT 38.2 03/20/2017    MCV 94 03/20/2017    MCH 29.6 03/20/2017    MCHC 31.4 (L) 03/20/2017    RDW 12.2 03/20/2017    PLT " 436 03/20/2017       BMP RESULTS:  Lab Results   Component Value Date     03/20/2017    POTASSIUM 4.3 03/20/2017    CHLORIDE 104 03/20/2017    CO2 30 03/20/2017    ANIONGAP 6 03/20/2017    GLC 89 03/20/2017    BUN 10 03/20/2017    CR 0.68 03/20/2017    GFRESTIMATED >90  Non  GFR Calc   03/20/2017    GFRESTBLACK >90   GFR Calc   03/20/2017    GLORY 9.3 03/20/2017        A1C RESULTS:  No results found for: A1C    INR RESULTS:  No results found for: INR        CC  No referring provider defined for this encounter.                  Service Date: 02/15/2019      HISTORY OF PRESENT ILLNESS:  It is a pleasure for me to see this very pleasant 38-year-old lady at the request of her OB physician for evaluation of a cardiac murmur.  This lady is scheduled for a desmoid cyst removal from her right chest wall and on preop physical, a murmur was detected.      This lady was told of a murmur when she was a child.  Nobody has heard a cardiac murmur until most recently.  When she was a teenager, she has had an episode of chest pain thought due to be costochondritis.  She used to exercise on a regular basis and that she has no symptoms of exertional chest discomfort, unusual shortness of breath, exertional dizzy spells or syncopal episodes.  There is no history of drug, alcohol or illicit drug use.      FAMILY HISTORY:  Completely negative for cardiac disease or sudden death.  Her 5-year-old son has what she described as a Still murmur which I suspect to be innocent.      When I initially auscultated, I did not hear much of a murmur but with Valsalva, a 2/6 systolic murmur was clearly audible at the left sternal border.  This would certainly correspond with her very interesting echocardiographic findings.  She has asymmetric left ventricular hypertrophy.  However, the septal thickness is only 13 mm.  No significant LVOT obstruction was detected at echocardiography.  She has mild mitral regurgitation  due to a systolic anterior motion of the mitral valve leaflet and there is mild tricuspid regurgitation.      Her EKG is normal.  I do notice that her blood pressure is a little elevated in our office today.  She tells me that outside of physician visits, her blood pressure is normal.  However, she had what sounded like pregnancy-induced hypertension.      IMPRESSION:   1.  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   2.  Hypertrophic cardiomyopathy, suspected.   3.  Probable white coat hypertension.      This delightful young lady has clinical features of hypertrophic cardiomyopathy.  However, I am very reassured to find that she does not have any of the high-risk features associated with this condition.  She is completely asymptomatic and there is no family history of sudden cardiac death.  The septum is only mildly thickened.  EKG is normal.      I think she would be absolutely fine to have her upcoming surgery for the desmoid cyst.  I would like to investigate her cardiac condition further after she has the operation.  I will schedule a cardiac MR.  I will also obtain a 24-hour Holter monitor to exclude the rare possibility of cardiac arrhythmia.      I wished her well for her upcoming surgery and look forward to seeing her in 2-3 months' time.         ANNY TREJO MD, [facility]             D: 02/15/2019   T: 02/15/2019   MT: NICHOL      Name:     HARVEY AMADOR   MRN:      [id number]        Account:      [id number]   :      1980           Service Date: 02/15/2019      Document: [id number]         Thank you for allowing me to participate in the care of your patient.      Sincerely,     DR ANNY TREJO MD     [facility]    cc:   No referring provider defined for this encounter.

## 2019-02-18 ENCOUNTER — APPOINTMENT (OUTPATIENT)
Dept: SURGERY | Facility: PHYSICIAN GROUP | Age: 39
End: 2019-02-18
Payer: COMMERCIAL

## 2019-02-18 ENCOUNTER — ANESTHESIA EVENT (OUTPATIENT)
Dept: SURGERY | Facility: CLINIC | Age: 39
End: 2019-02-18
Payer: COMMERCIAL

## 2019-02-18 ENCOUNTER — HOSPITAL ENCOUNTER (OUTPATIENT)
Facility: CLINIC | Age: 39
LOS: 1 days | Discharge: HOME | End: 2019-02-19
Attending: THORACIC SURGERY (CARDIOTHORACIC VASCULAR SURGERY) | Admitting: THORACIC SURGERY (CARDIOTHORACIC VASCULAR SURGERY)
Payer: COMMERCIAL

## 2019-02-18 ENCOUNTER — ANESTHESIA (OUTPATIENT)
Dept: SURGERY | Facility: CLINIC | Age: 39
End: 2019-02-18
Payer: COMMERCIAL

## 2019-02-18 DIAGNOSIS — R22.2 MASS OF RIGHT CHEST WALL: Primary | ICD-10-CM

## 2019-02-18 LAB
ANION GAP SERPL CALCULATED.3IONS-SCNC: 5 MMOL/L (ref 3–14)
BUN SERPL-MCNC: 14 MG/DL (ref 7–30)
CALCIUM SERPL-MCNC: 8.6 MG/DL (ref 8.5–10.1)
CHLORIDE SERPL-SCNC: 108 MMOL/L (ref 94–109)
CO2 SERPL-SCNC: 27 MMOL/L (ref 20–32)
CREAT SERPL-MCNC: 0.79 MG/DL (ref 0.52–1.04)
ERYTHROCYTE [DISTWIDTH] IN BLOOD BY AUTOMATED COUNT: 12.1 % (ref 10–15)
GFR SERPL CREATININE-BSD FRML MDRD: >90 ML/MIN/{1.73_M2}
GLUCOSE SERPL-MCNC: 103 MG/DL (ref 70–99)
HCG UR QL: NEGATIVE
HCT VFR BLD AUTO: 38.5 % (ref 35–47)
HGB BLD-MCNC: 12.9 G/DL (ref 11.7–15.7)
INR PPP: 0.95 (ref 0.86–1.14)
MCH RBC QN AUTO: 30.6 PG (ref 26.5–33)
MCHC RBC AUTO-ENTMCNC: 33.5 G/DL (ref 31.5–36.5)
MCV RBC AUTO: 91 FL (ref 78–100)
PLATELET # BLD AUTO: 232 10E9/L (ref 150–450)
POTASSIUM SERPL-SCNC: 3.8 MMOL/L (ref 3.4–5.3)
RBC # BLD AUTO: 4.21 10E12/L (ref 3.8–5.2)
SODIUM SERPL-SCNC: 140 MMOL/L (ref 133–144)
WBC # BLD AUTO: 6.3 10E9/L (ref 4–11)

## 2019-02-18 PROCEDURE — 25000128 H RX IP 250 OP 636: Performed by: ANESTHESIOLOGY

## 2019-02-18 PROCEDURE — 36000063 ZZH SURGERY LEVEL 4 EA 15 ADDTL MIN: Performed by: THORACIC SURGERY (CARDIOTHORACIC VASCULAR SURGERY)

## 2019-02-18 PROCEDURE — 25000132 ZZH RX MED GY IP 250 OP 250 PS 637: Performed by: PHYSICIAN ASSISTANT

## 2019-02-18 PROCEDURE — 40000170 ZZH STATISTIC PRE-PROCEDURE ASSESSMENT II: Performed by: THORACIC SURGERY (CARDIOTHORACIC VASCULAR SURGERY)

## 2019-02-18 PROCEDURE — 25000128 H RX IP 250 OP 636: Performed by: NURSE ANESTHETIST, CERTIFIED REGISTERED

## 2019-02-18 PROCEDURE — 25000125 ZZHC RX 250: Performed by: THORACIC SURGERY (CARDIOTHORACIC VASCULAR SURGERY)

## 2019-02-18 PROCEDURE — 88305 TISSUE EXAM BY PATHOLOGIST: CPT | Mod: 26 | Performed by: THORACIC SURGERY (CARDIOTHORACIC VASCULAR SURGERY)

## 2019-02-18 PROCEDURE — 88305 TISSUE EXAM BY PATHOLOGIST: CPT | Performed by: THORACIC SURGERY (CARDIOTHORACIC VASCULAR SURGERY)

## 2019-02-18 PROCEDURE — 25800030 ZZH RX IP 258 OP 636: Performed by: NURSE ANESTHETIST, CERTIFIED REGISTERED

## 2019-02-18 PROCEDURE — 37000008 ZZH ANESTHESIA TECHNICAL FEE, 1ST 30 MIN: Performed by: THORACIC SURGERY (CARDIOTHORACIC VASCULAR SURGERY)

## 2019-02-18 PROCEDURE — 85027 COMPLETE CBC AUTOMATED: CPT | Performed by: THORACIC SURGERY (CARDIOTHORACIC VASCULAR SURGERY)

## 2019-02-18 PROCEDURE — 27210794 ZZH OR GENERAL SUPPLY STERILE: Performed by: THORACIC SURGERY (CARDIOTHORACIC VASCULAR SURGERY)

## 2019-02-18 PROCEDURE — 25000125 ZZHC RX 250: Performed by: NURSE ANESTHETIST, CERTIFIED REGISTERED

## 2019-02-18 PROCEDURE — 25000566 ZZH SEVOFLURANE, EA 15 MIN: Performed by: THORACIC SURGERY (CARDIOTHORACIC VASCULAR SURGERY)

## 2019-02-18 PROCEDURE — 25800030 ZZH RX IP 258 OP 636: Performed by: PHYSICIAN ASSISTANT

## 2019-02-18 PROCEDURE — 36415 COLL VENOUS BLD VENIPUNCTURE: CPT | Performed by: THORACIC SURGERY (CARDIOTHORACIC VASCULAR SURGERY)

## 2019-02-18 PROCEDURE — 37000009 ZZH ANESTHESIA TECHNICAL FEE, EACH ADDTL 15 MIN: Performed by: THORACIC SURGERY (CARDIOTHORACIC VASCULAR SURGERY)

## 2019-02-18 PROCEDURE — 81025 URINE PREGNANCY TEST: CPT | Performed by: ANESTHESIOLOGY

## 2019-02-18 PROCEDURE — 25800030 ZZH RX IP 258 OP 636: Performed by: ANESTHESIOLOGY

## 2019-02-18 PROCEDURE — 85610 PROTHROMBIN TIME: CPT | Performed by: THORACIC SURGERY (CARDIOTHORACIC VASCULAR SURGERY)

## 2019-02-18 PROCEDURE — 71000012 ZZH RECOVERY PHASE 1 LEVEL 1 FIRST HR: Performed by: THORACIC SURGERY (CARDIOTHORACIC VASCULAR SURGERY)

## 2019-02-18 PROCEDURE — 25000128 H RX IP 250 OP 636: Performed by: THORACIC SURGERY (CARDIOTHORACIC VASCULAR SURGERY)

## 2019-02-18 PROCEDURE — 36000093 ZZH SURGERY LEVEL 4 1ST 30 MIN: Performed by: THORACIC SURGERY (CARDIOTHORACIC VASCULAR SURGERY)

## 2019-02-18 PROCEDURE — 80048 BASIC METABOLIC PNL TOTAL CA: CPT | Performed by: THORACIC SURGERY (CARDIOTHORACIC VASCULAR SURGERY)

## 2019-02-18 RX ORDER — ACETAMINOPHEN 325 MG/1
975 TABLET ORAL EVERY 6 HOURS PRN
Start: 2019-02-18 | End: 2019-05-29

## 2019-02-18 RX ORDER — DIPHENHYDRAMINE HCL 25 MG
25 CAPSULE ORAL EVERY 6 HOURS PRN
Status: DISCONTINUED | OUTPATIENT
Start: 2019-02-18 | End: 2019-02-19 | Stop reason: HOSPADM

## 2019-02-18 RX ORDER — FENTANYL CITRATE 50 UG/ML
25-50 INJECTION, SOLUTION INTRAMUSCULAR; INTRAVENOUS
Status: DISCONTINUED | OUTPATIENT
Start: 2019-02-18 | End: 2019-02-18 | Stop reason: HOSPADM

## 2019-02-18 RX ORDER — HYDROMORPHONE HYDROCHLORIDE 1 MG/ML
.3-.5 INJECTION, SOLUTION INTRAMUSCULAR; INTRAVENOUS; SUBCUTANEOUS EVERY 5 MIN PRN
Status: DISCONTINUED | OUTPATIENT
Start: 2019-02-18 | End: 2019-02-18 | Stop reason: HOSPADM

## 2019-02-18 RX ORDER — CALCIUM CARBONATE 500 MG/1
1000 TABLET, CHEWABLE ORAL 4 TIMES DAILY PRN
Status: DISCONTINUED | OUTPATIENT
Start: 2019-02-18 | End: 2019-02-19 | Stop reason: HOSPADM

## 2019-02-18 RX ORDER — SODIUM CHLORIDE, SODIUM LACTATE, POTASSIUM CHLORIDE, CALCIUM CHLORIDE 600; 310; 30; 20 MG/100ML; MG/100ML; MG/100ML; MG/100ML
INJECTION, SOLUTION INTRAVENOUS CONTINUOUS
Status: DISCONTINUED | OUTPATIENT
Start: 2019-02-18 | End: 2019-02-18 | Stop reason: HOSPADM

## 2019-02-18 RX ORDER — ONDANSETRON 2 MG/ML
INJECTION INTRAMUSCULAR; INTRAVENOUS PRN
Status: DISCONTINUED | OUTPATIENT
Start: 2019-02-18 | End: 2019-02-18

## 2019-02-18 RX ORDER — HYDROMORPHONE HYDROCHLORIDE 2 MG/1
2-4 TABLET ORAL
Status: DISCONTINUED | OUTPATIENT
Start: 2019-02-18 | End: 2019-02-19 | Stop reason: HOSPADM

## 2019-02-18 RX ORDER — BUPIVACAINE HYDROCHLORIDE 5 MG/ML
INJECTION, SOLUTION PERINEURAL PRN
Status: DISCONTINUED | OUTPATIENT
Start: 2019-02-18 | End: 2019-02-18 | Stop reason: HOSPADM

## 2019-02-18 RX ORDER — NEOSTIGMINE METHYLSULFATE 1 MG/ML
VIAL (ML) INJECTION PRN
Status: DISCONTINUED | OUTPATIENT
Start: 2019-02-18 | End: 2019-02-18

## 2019-02-18 RX ORDER — FENTANYL CITRATE 50 UG/ML
INJECTION, SOLUTION INTRAMUSCULAR; INTRAVENOUS PRN
Status: DISCONTINUED | OUTPATIENT
Start: 2019-02-18 | End: 2019-02-18

## 2019-02-18 RX ORDER — ONDANSETRON 4 MG/1
4 TABLET, ORALLY DISINTEGRATING ORAL EVERY 6 HOURS PRN
Status: DISCONTINUED | OUTPATIENT
Start: 2019-02-18 | End: 2019-02-19 | Stop reason: HOSPADM

## 2019-02-18 RX ORDER — CEFAZOLIN SODIUM 2 G/100ML
2 INJECTION, SOLUTION INTRAVENOUS
Status: COMPLETED | OUTPATIENT
Start: 2019-02-18 | End: 2019-02-18

## 2019-02-18 RX ORDER — GLYCOPYRROLATE 0.2 MG/ML
INJECTION, SOLUTION INTRAMUSCULAR; INTRAVENOUS PRN
Status: DISCONTINUED | OUTPATIENT
Start: 2019-02-18 | End: 2019-02-18

## 2019-02-18 RX ORDER — HYDROMORPHONE HYDROCHLORIDE 1 MG/ML
0.2 INJECTION, SOLUTION INTRAMUSCULAR; INTRAVENOUS; SUBCUTANEOUS
Status: DISCONTINUED | OUTPATIENT
Start: 2019-02-18 | End: 2019-02-19 | Stop reason: HOSPADM

## 2019-02-18 RX ORDER — PROPOFOL 10 MG/ML
INJECTION, EMULSION INTRAVENOUS PRN
Status: DISCONTINUED | OUTPATIENT
Start: 2019-02-18 | End: 2019-02-18

## 2019-02-18 RX ORDER — FENTANYL CITRATE 50 UG/ML
25-100 INJECTION, SOLUTION INTRAMUSCULAR; INTRAVENOUS
Status: DISCONTINUED | OUTPATIENT
Start: 2019-02-18 | End: 2019-02-18 | Stop reason: HOSPADM

## 2019-02-18 RX ORDER — NALOXONE HYDROCHLORIDE 0.4 MG/ML
.1-.4 INJECTION, SOLUTION INTRAMUSCULAR; INTRAVENOUS; SUBCUTANEOUS
Status: DISCONTINUED | OUTPATIENT
Start: 2019-02-18 | End: 2019-02-19 | Stop reason: HOSPADM

## 2019-02-18 RX ORDER — LIDOCAINE 40 MG/G
CREAM TOPICAL
Status: DISCONTINUED | OUTPATIENT
Start: 2019-02-18 | End: 2019-02-19 | Stop reason: HOSPADM

## 2019-02-18 RX ORDER — SODIUM CHLORIDE 9 MG/ML
INJECTION, SOLUTION INTRAVENOUS CONTINUOUS
Status: DISCONTINUED | OUTPATIENT
Start: 2019-02-18 | End: 2019-02-18

## 2019-02-18 RX ORDER — PROCHLORPERAZINE MALEATE 10 MG
10 TABLET ORAL EVERY 6 HOURS PRN
Status: DISCONTINUED | OUTPATIENT
Start: 2019-02-18 | End: 2019-02-19 | Stop reason: HOSPADM

## 2019-02-18 RX ORDER — AMOXICILLIN 250 MG
2 CAPSULE ORAL 2 TIMES DAILY
Status: DISCONTINUED | OUTPATIENT
Start: 2019-02-18 | End: 2019-02-19 | Stop reason: HOSPADM

## 2019-02-18 RX ORDER — AMOXICILLIN 250 MG
1-2 CAPSULE ORAL 2 TIMES DAILY PRN
Qty: 24 TABLET | Refills: 0 | Status: SHIPPED | OUTPATIENT
Start: 2019-02-18 | End: 2019-05-29

## 2019-02-18 RX ORDER — DIPHENHYDRAMINE HYDROCHLORIDE 50 MG/ML
25 INJECTION INTRAMUSCULAR; INTRAVENOUS EVERY 6 HOURS PRN
Status: DISCONTINUED | OUTPATIENT
Start: 2019-02-18 | End: 2019-02-19 | Stop reason: HOSPADM

## 2019-02-18 RX ORDER — IBUPROFEN 200 MG
600 TABLET ORAL DAILY PRN
Status: ON HOLD | COMMUNITY
End: 2019-02-18

## 2019-02-18 RX ORDER — MAGNESIUM HYDROXIDE 1200 MG/15ML
LIQUID ORAL PRN
Status: DISCONTINUED | OUTPATIENT
Start: 2019-02-18 | End: 2019-02-18 | Stop reason: HOSPADM

## 2019-02-18 RX ORDER — IBUPROFEN 200 MG
600 TABLET ORAL EVERY 6 HOURS PRN
Qty: 100 TABLET
Start: 2019-02-18 | End: 2019-05-29

## 2019-02-18 RX ORDER — AMOXICILLIN 250 MG
1 CAPSULE ORAL 2 TIMES DAILY
Status: DISCONTINUED | OUTPATIENT
Start: 2019-02-18 | End: 2019-02-19 | Stop reason: HOSPADM

## 2019-02-18 RX ORDER — LABETALOL 20 MG/4 ML (5 MG/ML) INTRAVENOUS SYRINGE
10
Status: DISCONTINUED | OUTPATIENT
Start: 2019-02-18 | End: 2019-02-18 | Stop reason: HOSPADM

## 2019-02-18 RX ORDER — NALOXONE HYDROCHLORIDE 0.4 MG/ML
.1-.4 INJECTION, SOLUTION INTRAMUSCULAR; INTRAVENOUS; SUBCUTANEOUS
Status: DISCONTINUED | OUTPATIENT
Start: 2019-02-18 | End: 2019-02-18

## 2019-02-18 RX ORDER — KETOROLAC TROMETHAMINE 30 MG/ML
30 INJECTION, SOLUTION INTRAMUSCULAR; INTRAVENOUS
Status: DISCONTINUED | OUTPATIENT
Start: 2019-02-18 | End: 2019-02-18 | Stop reason: HOSPADM

## 2019-02-18 RX ORDER — CEFAZOLIN SODIUM 1 G/3ML
1 INJECTION, POWDER, FOR SOLUTION INTRAMUSCULAR; INTRAVENOUS SEE ADMIN INSTRUCTIONS
Status: DISCONTINUED | OUTPATIENT
Start: 2019-02-18 | End: 2019-02-18 | Stop reason: HOSPADM

## 2019-02-18 RX ORDER — HYDROMORPHONE HYDROCHLORIDE 2 MG/1
2-4 TABLET ORAL EVERY 4 HOURS PRN
Qty: 30 TABLET | Refills: 0 | Status: SHIPPED | OUTPATIENT
Start: 2019-02-18 | End: 2019-05-29

## 2019-02-18 RX ORDER — ACETAMINOPHEN 325 MG/1
975 TABLET ORAL EVERY 6 HOURS PRN
Status: DISCONTINUED | OUTPATIENT
Start: 2019-02-18 | End: 2019-02-19 | Stop reason: HOSPADM

## 2019-02-18 RX ORDER — ONDANSETRON 2 MG/ML
4 INJECTION INTRAMUSCULAR; INTRAVENOUS EVERY 6 HOURS PRN
Status: DISCONTINUED | OUTPATIENT
Start: 2019-02-18 | End: 2019-02-19 | Stop reason: HOSPADM

## 2019-02-18 RX ORDER — IBUPROFEN 600 MG/1
600 TABLET, FILM COATED ORAL 4 TIMES DAILY
Status: DISCONTINUED | OUTPATIENT
Start: 2019-02-18 | End: 2019-02-19 | Stop reason: HOSPADM

## 2019-02-18 RX ORDER — DEXAMETHASONE SODIUM PHOSPHATE 4 MG/ML
INJECTION, SOLUTION INTRA-ARTICULAR; INTRALESIONAL; INTRAMUSCULAR; INTRAVENOUS; SOFT TISSUE PRN
Status: DISCONTINUED | OUTPATIENT
Start: 2019-02-18 | End: 2019-02-18

## 2019-02-18 RX ORDER — ONDANSETRON 4 MG/1
4 TABLET, ORALLY DISINTEGRATING ORAL EVERY 30 MIN PRN
Status: DISCONTINUED | OUTPATIENT
Start: 2019-02-18 | End: 2019-02-18 | Stop reason: HOSPADM

## 2019-02-18 RX ORDER — ONDANSETRON 2 MG/ML
4 INJECTION INTRAMUSCULAR; INTRAVENOUS EVERY 30 MIN PRN
Status: DISCONTINUED | OUTPATIENT
Start: 2019-02-18 | End: 2019-02-18 | Stop reason: HOSPADM

## 2019-02-18 RX ORDER — LIDOCAINE HYDROCHLORIDE 20 MG/ML
INJECTION, SOLUTION INFILTRATION; PERINEURAL PRN
Status: DISCONTINUED | OUTPATIENT
Start: 2019-02-18 | End: 2019-02-18

## 2019-02-18 RX ADMIN — SODIUM CHLORIDE, POTASSIUM CHLORIDE, SODIUM LACTATE AND CALCIUM CHLORIDE: 600; 310; 30; 20 INJECTION, SOLUTION INTRAVENOUS at 07:58

## 2019-02-18 RX ADMIN — ACETAMINOPHEN 975 MG: 325 TABLET, FILM COATED ORAL at 19:42

## 2019-02-18 RX ADMIN — DEXMEDETOMIDINE HYDROCHLORIDE 20 MCG: 100 INJECTION, SOLUTION INTRAVENOUS at 07:27

## 2019-02-18 RX ADMIN — HYDROMORPHONE HYDROCHLORIDE 1 MG: 2 TABLET ORAL at 11:07

## 2019-02-18 RX ADMIN — IBUPROFEN 600 MG: 600 TABLET ORAL at 11:40

## 2019-02-18 RX ADMIN — Medication 0.5 MG: at 08:54

## 2019-02-18 RX ADMIN — IBUPROFEN 600 MG: 600 TABLET ORAL at 22:33

## 2019-02-18 RX ADMIN — CEFAZOLIN SODIUM 2 G: 2 INJECTION, SOLUTION INTRAVENOUS at 07:44

## 2019-02-18 RX ADMIN — ACETAMINOPHEN 975 MG: 325 TABLET, FILM COATED ORAL at 14:28

## 2019-02-18 RX ADMIN — NEOSTIGMINE METHYLSULFATE 4 MG: 1 INJECTION, SOLUTION INTRAVENOUS at 08:13

## 2019-02-18 RX ADMIN — GUAIFENESIN AND DEXTROMETHORPHAN HYDROBROMIDE 1 TABLET: 600; 30 TABLET, EXTENDED RELEASE ORAL at 12:57

## 2019-02-18 RX ADMIN — PROPOFOL 50 MG: 10 INJECTION, EMULSION INTRAVENOUS at 07:31

## 2019-02-18 RX ADMIN — SODIUM CHLORIDE: 9 INJECTION, SOLUTION INTRAVENOUS at 10:38

## 2019-02-18 RX ADMIN — Medication 0.5 MG: at 09:47

## 2019-02-18 RX ADMIN — SODIUM CHLORIDE, POTASSIUM CHLORIDE, SODIUM LACTATE AND CALCIUM CHLORIDE: 600; 310; 30; 20 INJECTION, SOLUTION INTRAVENOUS at 07:24

## 2019-02-18 RX ADMIN — HYDROMORPHONE HYDROCHLORIDE 4 MG: 2 TABLET ORAL at 22:33

## 2019-02-18 RX ADMIN — Medication 0.5 MG: at 08:39

## 2019-02-18 RX ADMIN — LIDOCAINE HYDROCHLORIDE 100 MG: 20 INJECTION, SOLUTION INFILTRATION; PERINEURAL at 07:27

## 2019-02-18 RX ADMIN — GLYCOPYRROLATE 0.6 MG: 0.2 INJECTION, SOLUTION INTRAMUSCULAR; INTRAVENOUS at 08:13

## 2019-02-18 RX ADMIN — FENTANYL CITRATE 50 MCG: 50 INJECTION, SOLUTION INTRAMUSCULAR; INTRAVENOUS at 07:48

## 2019-02-18 RX ADMIN — HYDROMORPHONE HYDROCHLORIDE 2 MG: 2 TABLET ORAL at 15:36

## 2019-02-18 RX ADMIN — PROPOFOL 200 MG: 10 INJECTION, EMULSION INTRAVENOUS at 07:27

## 2019-02-18 RX ADMIN — FENTANYL CITRATE 25 MCG: 50 INJECTION, SOLUTION INTRAMUSCULAR; INTRAVENOUS at 08:28

## 2019-02-18 RX ADMIN — SENNOSIDES AND DOCUSATE SODIUM 2 TABLET: 8.6; 5 TABLET ORAL at 19:43

## 2019-02-18 RX ADMIN — SENNOSIDES AND DOCUSATE SODIUM 1 TABLET: 8.6; 5 TABLET ORAL at 11:08

## 2019-02-18 RX ADMIN — FENTANYL CITRATE 25 MCG: 50 INJECTION, SOLUTION INTRAMUSCULAR; INTRAVENOUS at 08:26

## 2019-02-18 RX ADMIN — HYDROMORPHONE HYDROCHLORIDE 1 MG: 2 TABLET ORAL at 11:40

## 2019-02-18 RX ADMIN — HYDROMORPHONE HYDROCHLORIDE 3 MG: 2 TABLET ORAL at 19:43

## 2019-02-18 RX ADMIN — Medication 0.5 MG: at 09:15

## 2019-02-18 RX ADMIN — ONDANSETRON 4 MG: 2 INJECTION INTRAMUSCULAR; INTRAVENOUS at 07:56

## 2019-02-18 RX ADMIN — MIDAZOLAM 2 MG: 1 INJECTION INTRAMUSCULAR; INTRAVENOUS at 07:24

## 2019-02-18 RX ADMIN — ROCURONIUM BROMIDE 50 MG: 10 INJECTION INTRAVENOUS at 07:27

## 2019-02-18 RX ADMIN — IBUPROFEN 600 MG: 600 TABLET ORAL at 15:36

## 2019-02-18 RX ADMIN — FENTANYL CITRATE 100 MCG: 50 INJECTION, SOLUTION INTRAMUSCULAR; INTRAVENOUS at 07:27

## 2019-02-18 RX ADMIN — GUAIFENESIN AND DEXTROMETHORPHAN HYDROBROMIDE 1 TABLET: 600; 30 TABLET, EXTENDED RELEASE ORAL at 22:34

## 2019-02-18 RX ADMIN — DEXAMETHASONE SODIUM PHOSPHATE 4 MG: 4 INJECTION, SOLUTION INTRA-ARTICULAR; INTRALESIONAL; INTRAMUSCULAR; INTRAVENOUS; SOFT TISSUE at 07:43

## 2019-02-18 ASSESSMENT — MIFFLIN-ST. JEOR: SCORE: 1573.53

## 2019-02-18 NOTE — OR NURSING
Sign out given by Dr. Isabel for patient to go to the floor at 0930; awaiting RN on Obs to give report.

## 2019-02-18 NOTE — BRIEF OP NOTE
[facility]    Brief Operative Note    Pre-operative diagnosis: RIGHT ANTERIOR CHEST WALL MASS  Post-operative diagnosis same  Procedure:   Right anterior chest wall resection  Surgeon: Surgeon(s) and Role:     * John English MD - Primary     * Magui Rae PA-C - Assisting     * Roxi Mcdonnell MD - Assisting  Anesthesia: General   Estimated blood loss: 5 cc  Drains:  One 10 flat KATJA drain to bulb suction  Specimens:   ID Type Source Tests Collected by Time Destination   A : right chest wall mass Tissue Chest SURGICAL PATHOLOGY EXAM John English MD 2/18/2019  7:59 AM      Findings:   right anterior chest wall mass not affixed to surrounding bony nor cartilaginous structures-- await final pathology  Complications: None.  Implants: None.

## 2019-02-18 NOTE — PROGRESS NOTES
Admission medication history interview status for the 2/18/2019  admission is complete. See EPIC admission navigator for prior to admission medications     Medication history source reliability:Moderate    Medication history interview source(s):Patient    Medication history resources (including written lists, pill bottles, clinic record): written list    Primary pharmacy. [facility]    Additional medication history information not noted on PTA med list : Pt currently has her IUD in    Time spent in this activity: 30 mins    Prior to Admission medications    Medication Sig Last Dose Taking? Auth Provider   Dextromethorphan-Guaifenesin (MUCINEX DM PO) Take 1 tablet by mouth every 12 hours  2/17/2019 at 2130 Yes Reported, Patient   ibuprofen (ADVIL/MOTRIN) 200 MG tablet Take 600 mg by mouth daily as needed for mild pain 2/14/2019 Yes Reported, Patient   Loratadine (CLARITIN PO) Take 1 tablet by mouth every evening  2/17/2019 at 1800 Yes Reported, Patient   Pseudoephedrine HCl (SUDAFED CONGESTION PO) Take 1 tablet by mouth daily  2/17/2019 at 1800 Yes Reported, Patient   triamcinolone (KENALOG) 0.5 % cream Apply topically 2 times daily PRN Eczema 3 months ago Yes Reported, Patient   levonorgestrel (MIRENA) 20 MCG/24HR IUD 1 each (20 mcg) by Intrauterine route once for 1 dose Put in a year ago  Josselin Garcia MD

## 2019-02-18 NOTE — ANESTHESIA CARE TRANSFER NOTE
Patient: Shannen Frank    Procedure(s):  RIGHT ANTERIOR CHEST WALL RESECTION AND RECONSTRUCTION    Diagnosis: RIGHT ANTERIOR CHEST WALL MASS  Diagnosis Additional Information: No value filed.    Anesthesia Type:   General, ETT     Note:  Airway :Face Mask  Patient transferred to:PACU  Comments: Neuromuscular blockade reversed after TOF 4/4, spontaneous respirations, adequate tidal volumes, followed commands to voice, oropharynx suctioned with soft flexible catheter, extubated atraumatically, extubated with suction, airway patent after extubation.  Oxygen via facemask at 8 liters per minute to PACU. Oxygen tubing connected to wall O2 in PACU, SpO2, NiBP, and EKG monitors and alarms on and functioning, Shama Hugger warmer connected to patient gown, report on patient's clinical status given to PACU RN, RN questions answered. Handoff Report: Identifed the Patient, Identified the Reponsible Provider, Reviewed the pertinent medical history, Discussed the surgical course, Reviewed Intra-OP anesthesia mangement and issues during anesthesia, Set expectations for post-procedure period and Allowed opportunity for questions and acknowledgement of understanding      Vitals: (Last set prior to Anesthesia Care Transfer)    CRNA VITALS  2/18/2019 0758 - 2/18/2019 0833      2/18/2019             Pulse:  115    SpO2:  99 %    Resp Rate (set):  10                Electronically Signed By: IRMA Mcdaniel CRNA  February 18, 2019  8:33 AM

## 2019-02-18 NOTE — OP NOTE
Procedure Date: 02/18/2019      SURGEON:  John English MD       FIRST ASSISTANT:  Roxi Mcdonnell MD      SECOND ASSISTANT:  Magui Rae PA-C      PREOPERATIVE DIAGNOSIS:  Right anterior chest wall mass.        POSTOPERATIVE DIAGNOSIS:  Right anterior chest wall mass.        PROCEDURE:  Resection of right anterior chest wall mass.      ANESTHESIA:  General with double lumen endotracheal tube.        INDICATIONS: A 38-year-old woman was found to have a fixed 5 cm mass over the right anterior chest wall at the level of the fourth costal cartilage.  The imaging studies revealed a mass.  Biopsy shows fibromatosis.  Based on the finding, a chest wall resection with reconstruction is indicated for diagnosis and treatment.      DESCRIPTION OF PROCEDURE:  The patient was brought and placed into a supine position and general anesthesia with double orotracheal tube.  The patient's chest and upper abdomen was prepared and draped in sterile fashion using ChloraPrep.  A curvilinear incision was made over the tumor.  A flap of skin and subcutaneous tissue was elevated superiorly and inferiorly.  The tumor was palpated.  The dissection was carried down with excellent margin through the adipose tissue and minimal breast tissue medially and inferiorly to the breast.  Then, the dissection was carried through the pectoralis major muscle down to the fourth costal cartilage.  By palpation, the mass was not fixed to the costal cartilage and there was a plane of muscle between the mass and the cartilage.  Dissection was carried down, dividing some of the pectoralis major muscle circumferentially, excising the tumor completely with excellent margins.  Tumor measured approximately 5 cm.  Specimen measured 9 x 5 x 5 cm.        Through a separate stab wound, a 10 flat Ernesto-Yousif drain was placed.  Hemostasis again verified and was excellent.  The incision was closed with a running 2-0 Vicryl suture on the subcutaneous tissue  and the skin closed subcuticular 4-0 Vicryl suture and Steri-Strips.  Estimated blood loss was minimal.  The sponge count is correct.         CHARITY NOGUERA MD             D: 2019   T: 2019   MT: DEXTER      Name:     HARVEY AMADOR   MRN:      [id number]        Account:        [id number]   :      1980           Procedure Date: 2019      Document: [id number]

## 2019-02-18 NOTE — ANESTHESIA POSTPROCEDURE EVALUATION
Patient: Shannen Frank    Procedure(s):  RIGHT ANTERIOR CHEST WALL RESECTION AND RECONSTRUCTION    Diagnosis:RIGHT ANTERIOR CHEST WALL MASS  Diagnosis Additional Information: No value filed.    Anesthesia Type:  General, ETT    Note:  Anesthesia Post Evaluation    Patient location during evaluation: PACU  Patient participation: Able to fully participate in evaluation  Level of consciousness: awake  Pain management: adequate  Airway patency: patent  Cardiovascular status: acceptable  Respiratory status: acceptable  Hydration status: acceptable  PONV: controlled     Anesthetic complications: None          Last vitals:  Vitals:    02/18/19 0915 02/18/19 0920 02/18/19 0925   BP:  130/79    Pulse:  75    Resp:  21 17   Temp:  37.2  C (99  F)    SpO2: 96%  95%         Electronically Signed By: Curtis Isabel MD  February 18, 2019  9:34 AM

## 2019-02-18 NOTE — ANESTHESIA PREPROCEDURE EVALUATION
Anesthesia Pre-Procedure Evaluation    Patient: Shannen Frank   MRN: [id number] : 1980          Preoperative Diagnosis: RIGHT ANTERIOR CHEST WALL MASS    Procedure(s):  RIGHT ANTERIOR CHEST WALL RESECTION AND RECONSTRUCTION    Past Medical History:   Diagnosis Date     Breast lump      Eczema      Granulomatous mastitis     left breast     Herpes genitalis      Migraine      Migraine with aura      Past Surgical History:   Procedure Laterality Date     INCISION AND DRAINAGE TRUNK, COMBINED Left 2/3/2017    Procedure: COMBINED INCISION AND DRAINAGE TRUNK;  Surgeon: Roxi Mcdonnell MD;  Location:  OR     TONSILLECTOMY & ADENOIDECTOMY         Anesthesia Evaluation     . Pt has had prior anesthetic.     No history of anesthetic complications          ROS/MED HX    ENT/Pulmonary:     (+), . Other pulmonary disease desmoid cyst on chest wall.   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  The patient has no syncopal episodes and no significant SOB problems    (+) ----. : . . . :. . Previous cardiac testing Echodate:results:Interpretation Summary     Mildly elongated mitral valve leaflet with mild ALONDRA. The visual ejection fraction is estimated at 60-65%. Mild assymetric septal left ventricular hypertrophy (13 mm on contrasted images) with no significant LVOT obstructiondate: results: date: results: date: results:          METS/Exercise Tolerance:     Hematologic:         Musculoskeletal:         GI/Hepatic:        (-) GERD   Renal/Genitourinary:         Endo:         Psychiatric:         Infectious Disease:         Malignancy:         Other:                          Physical Exam  Normal systems: cardiovascular, pulmonary and dental    Airway   Mallampati: II  TM distance: >3 FB  Neck ROM: full    Dental     Cardiovascular       Pulmonary             Lab Results   Component Value Date    WBC 6.3  "02/18/2019    HGB 12.9 02/18/2019    HCT 38.5 02/18/2019     02/18/2019     02/18/2019    POTASSIUM 3.8 02/18/2019    CHLORIDE 108 02/18/2019    CO2 PENDING 02/18/2019    BUN PENDING 02/18/2019    CR PENDING 02/18/2019    GLC PENDING 02/18/2019    GLORY PENDING 02/18/2019    PHOS 2.7 03/20/2017    MAG 2.1 03/20/2017    ALBUMIN 3.6 03/20/2017    PROTTOTAL 7.8 03/20/2017    ALT 21 03/20/2017    AST 8 03/20/2017    ALKPHOS 103 03/20/2017    BILITOTAL 0.2 03/20/2017    TSH 1.57 03/20/2017    HCG Negative 02/18/2019    HCGS Negative 02/03/2017       Preop Vitals  BP Readings from Last 3 Encounters:   02/18/19 150/88   02/15/19 (!) 141/94   02/01/19 130/86    Pulse Readings from Last 3 Encounters:   02/18/19 83   02/15/19 86   02/01/19 82      Resp Readings from Last 3 Encounters:   02/18/19 16   02/03/17 16   01/29/17 18    SpO2 Readings from Last 3 Encounters:   02/18/19 99%   11/22/17 100%   06/05/17 100%      Temp Readings from Last 1 Encounters:   02/18/19 36.6  C (97.8  F) (Temporal)    Ht Readings from Last 1 Encounters:   02/18/19 1.753 m (5' 9\")      Wt Readings from Last 1 Encounters:   02/18/19 83.9 kg (185 lb)    Estimated body mass index is 27.32 kg/m  as calculated from the following:    Height as of this encounter: 1.753 m (5' 9\").    Weight as of this encounter: 83.9 kg (185 lb).       Anesthesia Plan      History & Physical Review  History and physical reviewed and following examination; no interval change.    ASA Status:  3 .    NPO Status:  > 8 hours    Plan for General and ETT with Intravenous induction. Maintenance will be Balanced.    PONV prophylaxis:  Ondansetron (or other 5HT-3)  500-1000L of IVF prior to induction  37F DL ETT      Postoperative Care  Postoperative pain management:  IV analgesics.      Consents  Anesthetic plan, risks, benefits and alternatives discussed with:  Patient..                 Curtis Isabel MD  "

## 2019-02-18 NOTE — PROGRESS NOTES
Goals -   Pain managed with oral medications - in progress   tolerates oral intake - met  Ambulating - met

## 2019-02-19 VITALS
OXYGEN SATURATION: 95 % | DIASTOLIC BLOOD PRESSURE: 68 MMHG | WEIGHT: 185 LBS | RESPIRATION RATE: 16 BRPM | BODY MASS INDEX: 27.4 KG/M2 | HEART RATE: 69 BPM | TEMPERATURE: 98.1 F | HEIGHT: 69 IN | SYSTOLIC BLOOD PRESSURE: 113 MMHG

## 2019-02-19 LAB
COPATH REPORT: NORMAL
GLUCOSE BLDC GLUCOMTR-MCNC: 110 MG/DL (ref 70–99)
GLUCOSE BLDC GLUCOMTR-MCNC: 111 MG/DL (ref 70–99)

## 2019-02-19 PROCEDURE — 82962 GLUCOSE BLOOD TEST: CPT | Mod: 91

## 2019-02-19 PROCEDURE — 25000132 ZZH RX MED GY IP 250 OP 250 PS 637: Performed by: PHYSICIAN ASSISTANT

## 2019-02-19 RX ADMIN — HYDROMORPHONE HYDROCHLORIDE 4 MG: 2 TABLET ORAL at 08:27

## 2019-02-19 RX ADMIN — HYDROMORPHONE HYDROCHLORIDE 4 MG: 2 TABLET ORAL at 11:38

## 2019-02-19 RX ADMIN — SENNOSIDES AND DOCUSATE SODIUM 1 TABLET: 8.6; 5 TABLET ORAL at 08:26

## 2019-02-19 RX ADMIN — IBUPROFEN 600 MG: 600 TABLET ORAL at 11:41

## 2019-02-19 RX ADMIN — IBUPROFEN 600 MG: 600 TABLET ORAL at 08:27

## 2019-02-19 RX ADMIN — HYDROMORPHONE HYDROCHLORIDE 4 MG: 2 TABLET ORAL at 04:29

## 2019-02-19 NOTE — PROGRESS NOTES
Patient A&0x4,VSS on RA. Up with SBA. Dressing changed to chest no drainage, steri strips CDI. . KATJA Site CDI. KATJA putting out small amounts of serosanguinous drainage . C/o Right chest and incisional pain. Pain controlled woith PO dilaudid and ibuprofen  and ice. Lungs clear. CMS intact. Using IS with encouragement. Gauze dressings and tape sent with pt.     Pt dc'd to home, verbalized understanding of dc instructions and meds, belongings sent with pt. Escorted via wheelchair to dc door.

## 2019-02-19 NOTE — PLAN OF CARE
Pain managed with oral medications - in progress   tolerates oral intake - met  Ambulating - met    Patient A&0x4. Up independently. Ambulated in the halls this shift. Dressing to chest CDI. KATJA Site CDI. KATJA putting out small amounts of serosang drainage. C/o Right chest and incisional pain. Pain controlled woith PO pain medications and ice. Lungs clear. CMS +. Using IS with encouragement.   
Patient A&0x4,VSS on RA. Up with SBA. Dressing to chest CDI. KATJA Site CDI. KATJA putting out small amounts of serosanguinous drainage . C/o Right chest and incisional pain. Pain controlled woith PO pain medications and ice. Lungs clear. CMS intact. Using IS with encouragement.   
RN:  Patient A/O x4.  VSS on RA. Capno on.   Managing pain with ice pack to chest and oral pain medications.  IV fluids infusing. Tolerating low fiber diet.  Ambulated to the bathroom x2 with successful voiding..  Dressing to chest intact--C/D/I..  KATJA drain patent and intact.  Surgery following.  Patient aiming to discharge tomorrow pending progress.    
normal...

## 2019-02-25 ENCOUNTER — TRANSFERRED RECORDS (OUTPATIENT)
Dept: HEALTH INFORMATION MANAGEMENT | Facility: CLINIC | Age: 39
End: 2019-02-25

## 2019-03-25 ENCOUNTER — HOSPITAL ENCOUNTER (OUTPATIENT)
Dept: CARDIOLOGY | Facility: CLINIC | Age: 39
Discharge: HOME | End: 2019-03-25
Attending: INTERNAL MEDICINE | Admitting: INTERNAL MEDICINE
Payer: COMMERCIAL

## 2019-03-25 VITALS — SYSTOLIC BLOOD PRESSURE: 168 MMHG | DIASTOLIC BLOOD PRESSURE: 95 MMHG

## 2019-03-25 DIAGNOSIS — R01.1 HEART MURMUR: ICD-10-CM

## 2019-03-25 PROCEDURE — 93227 XTRNL ECG REC<48 HR R&I: CPT | Performed by: INTERNAL MEDICINE

## 2019-03-25 PROCEDURE — A9585 GADOBUTROL INJECTION: HCPCS | Performed by: INTERNAL MEDICINE

## 2019-03-25 PROCEDURE — 93225 XTRNL ECG REC<48 HRS REC: CPT

## 2019-03-25 PROCEDURE — 25000132 ZZH RX MED GY IP 250 OP 250 PS 637: Performed by: INTERNAL MEDICINE

## 2019-03-25 PROCEDURE — 75565 CARD MRI VELOC FLOW MAPPING: CPT | Mod: 26 | Performed by: INTERNAL MEDICINE

## 2019-03-25 PROCEDURE — 75561 CARDIAC MRI FOR MORPH W/DYE: CPT | Mod: 26 | Performed by: INTERNAL MEDICINE

## 2019-03-25 PROCEDURE — 75561 CARDIAC MRI FOR MORPH W/DYE: CPT

## 2019-03-25 PROCEDURE — 25500064 ZZH RX 255 OP 636: Performed by: INTERNAL MEDICINE

## 2019-03-25 RX ORDER — ACYCLOVIR 200 MG/1
0-1 CAPSULE ORAL
Status: DISCONTINUED | OUTPATIENT
Start: 2019-03-25 | End: 2019-03-26 | Stop reason: HOSPADM

## 2019-03-25 RX ORDER — ONDANSETRON 2 MG/ML
4 INJECTION INTRAMUSCULAR; INTRAVENOUS
Status: DISCONTINUED | OUTPATIENT
Start: 2019-03-25 | End: 2019-03-26 | Stop reason: HOSPADM

## 2019-03-25 RX ORDER — DIPHENHYDRAMINE HYDROCHLORIDE 50 MG/ML
25-50 INJECTION INTRAMUSCULAR; INTRAVENOUS
Status: DISCONTINUED | OUTPATIENT
Start: 2019-03-25 | End: 2019-03-26 | Stop reason: HOSPADM

## 2019-03-25 RX ORDER — DIAZEPAM 5 MG
5 TABLET ORAL EVERY 30 MIN PRN
Status: DISCONTINUED | OUTPATIENT
Start: 2019-03-25 | End: 2019-03-26 | Stop reason: HOSPADM

## 2019-03-25 RX ORDER — GADOBUTROL 604.72 MG/ML
5-65 INJECTION INTRAVENOUS ONCE
Status: COMPLETED | OUTPATIENT
Start: 2019-03-25 | End: 2019-03-25

## 2019-03-25 RX ORDER — METHYLPREDNISOLONE SODIUM SUCCINATE 125 MG/2ML
125 INJECTION, POWDER, LYOPHILIZED, FOR SOLUTION INTRAMUSCULAR; INTRAVENOUS
Status: DISCONTINUED | OUTPATIENT
Start: 2019-03-25 | End: 2019-03-26 | Stop reason: HOSPADM

## 2019-03-25 RX ORDER — DIPHENHYDRAMINE HCL 25 MG
25 CAPSULE ORAL
Status: DISCONTINUED | OUTPATIENT
Start: 2019-03-25 | End: 2019-03-26 | Stop reason: HOSPADM

## 2019-03-25 RX ADMIN — DIAZEPAM 5 MG: 5 TABLET ORAL at 09:15

## 2019-03-25 RX ADMIN — GADOBUTROL 11 ML: 604.72 INJECTION INTRAVENOUS at 11:40

## 2019-03-25 NOTE — PROGRESS NOTES
PODIATRY PROGRESS NOTE  Jasbir Chapman  [id number]   Cardiac Core protocol with flows (LVOT & AoV)  Contrast administered per weight based protocol.  Per Dr King     Alexis Hdez, PGY3

## 2019-05-13 ENCOUNTER — TRANSFERRED RECORDS (OUTPATIENT)
Dept: HEALTH INFORMATION MANAGEMENT | Facility: CLINIC | Age: 39
End: 2019-05-13

## 2019-05-29 ENCOUNTER — OFFICE VISIT (OUTPATIENT)
Dept: CARDIOLOGY | Facility: CLINIC | Age: 39
End: 2019-05-29
Attending: INTERNAL MEDICINE
Payer: COMMERCIAL

## 2019-05-29 VITALS
WEIGHT: 186.7 LBS | DIASTOLIC BLOOD PRESSURE: 88 MMHG | HEIGHT: 69 IN | BODY MASS INDEX: 27.65 KG/M2 | SYSTOLIC BLOOD PRESSURE: 134 MMHG | HEART RATE: 88 BPM

## 2019-05-29 DIAGNOSIS — R01.1 HEART MURMUR: ICD-10-CM

## 2019-05-29 DIAGNOSIS — I42.2 HYPERTROPHIC CARDIOMYOPATHY (H): ICD-10-CM

## 2019-05-29 PROCEDURE — 99214 OFFICE O/P EST MOD 30 MIN: CPT | Performed by: INTERNAL MEDICINE

## 2019-05-29 ASSESSMENT — MIFFLIN-ST. JEOR: SCORE: 1581.25

## 2019-05-29 NOTE — PROGRESS NOTES
HPI and Plan:   See dictation    Orders Placed This Encounter   Procedures     Follow-Up with Cardiologist     Echocardiogram Complete       No orders of the defined types were placed in this encounter.      Encounter Diagnoses   Name Primary?     Heart murmur      Hypertrophic cardiomyopathy (H)        CURRENT MEDICATIONS:  Current Outpatient Medications   Medication Sig Dispense Refill     Dextromethorphan-Guaifenesin (MUCINEX DM PO) Take 1 tablet by mouth every 12 hours        levonorgestrel (MIRENA) 20 MCG/24HR IUD 1 each (20 mcg) by Intrauterine route once for 1 dose  0     Loratadine (CLARITIN PO) Take 1 tablet by mouth every evening        Pseudoephedrine HCl (SUDAFED CONGESTION PO) Take 1 tablet by mouth daily        triamcinolone (KENALOG) 0.5 % cream Apply topically 2 times daily PRN Eczema         ALLERGIES   No Known Allergies    PAST MEDICAL HISTORY:  Past Medical History:   Diagnosis Date     Breast lump      Eczema      Granulomatous mastitis     left breast     Herpes genitalis      Migraine      Migraine with aura        PAST SURGICAL HISTORY:  Past Surgical History:   Procedure Laterality Date     INCISION AND DRAINAGE TRUNK, COMBINED Left 2/3/2017    Procedure: COMBINED INCISION AND DRAINAGE TRUNK;  Surgeon: Roxi Mcdonnell MD;  Location: [facility] OR     REPAIR CHEST WALL Right 2/18/2019    Procedure: RIGHT ANTERIOR CHEST WALL RESECTION AND RECONSTRUCTION;  Surgeon: John English MD;  Location:  OR     TONSILLECTOMY & ADENOIDECTOMY         FAMILY HISTORY:  Family History   Problem Relation Age of Onset     Hypertension Mother      Hypertension Maternal Grandmother      Diabetes Type 2  Maternal Grandfather      Pacemaker Maternal Grandfather      Diabetes Type 2  Maternal Aunt      Cancer Maternal Aunt         gynecologic     Myocardial Infarction No family hx of      Cerebrovascular Disease No family hx of      Coronary Artery Disease Early Onset No family hx of      Colon Cancer No  family hx of      Breast Cancer No family hx of        SOCIAL HISTORY:  Social History     Socioeconomic History     Marital status:      Spouse name: None     Number of children: None     Years of education: None     Highest education level: None   Occupational History     Occupation:    Social Needs     Financial resource strain: None     Food insecurity:     Worry: None     Inability: None     Transportation needs:     Medical: None     Non-medical: None   Tobacco Use     Smoking status: Former Smoker     Types: Cigarettes     Smokeless tobacco: Never Used     Tobacco comment: social smoking in 20s only   Substance and Sexual Activity     Alcohol use: Yes     Frequency: 2-3 times a week     Drinks per session: 1 or 2     Comment: 2-3 glasses of wine, several days/week     Drug use: No     Sexual activity: Yes     Partners: Male     Birth control/protection: IUD     Comment: Mirena placed - November, 2017   Lifestyle     Physical activity:     Days per week: None     Minutes per session: None     Stress: None   Relationships     Social connections:     Talks on phone: None     Gets together: None     Attends Sabianist service: None     Active member of club or organization: None     Attends meetings of clubs or organizations: None     Relationship status: None     Intimate partner violence:     Fear of current or ex partner: None     Emotionally abused: None     Physically abused: None     Forced sexual activity: None   Other Topics Concern     Parent/sibling w/ CABG, MI or angioplasty before 65F 55M? Not Asked   Social History Narrative    Engaged (getting  in June, 2018).     Son (4 as of 2017) and step-daughter (19 as of 2017).    Goes to Y at least 2x/week.        Review of Systems:  Skin:  Negative     Eyes:  Positive for glasses;contacts  ENT:  Positive for postnasal drainage  Respiratory:  Negative    Cardiovascular:    palpitations;Positive for  Gastroenterology: Positive for  "heartburn  Genitourinary:  Negative    Musculoskeletal:  Negative    Neurologic:  Negative    Psychiatric:  Positive for anxiety  Heme/Lymph/Imm:  Positive for allergies  Endocrine:  Negative      Physical Exam:  Vitals: /88   Pulse 88   Ht 1.753 m (5' 9\")   Wt 84.7 kg (186 lb 11.2 oz)   BMI 27.57 kg/m      Constitutional:  cooperative, alert and oriented, well developed, well nourished, in no acute distress        Skin:  warm and dry to the touch, no apparent skin lesions or masses noted   pacemaker incision in the left infraclavicular area was well-healed      Head:  normocephalic, no masses or lesions        Eyes:  pupils equal and round, conjunctivae and lids unremarkable, sclera white, no xanthalasma, EOMS intact, no nystagmus        Lymph:No Cervical lymphadenopathy present     ENT:  no pallor or cyanosis, dentition good        Neck:  carotid pulses are full and equal bilaterally, JVP normal, no carotid bruit        Respiratory:  normal breath sounds, clear to auscultation, normal A-P diameter, normal symmetry, normal respiratory excursion, no use of accessory muscles         Cardiac: regular rhythm;normal S1 and S2;apical impulse not displaced       systolic murmur;LLSB        pulses full and equal, no bruits auscultated                                        GI:  abdomen soft, non-tender, BS normoactive, no mass, no HSM, no bruits        Extremities and Muscular Skeletal:  no deformities, clubbing, cyanosis, erythema observed              Neurological:  no gross motor deficits        Psych:  Alert and Oriented x 3        Recent Lab Results:  LIPID RESULTS:  Lab Results   Component Value Date    CHOL 213 (H) 11/22/2017    HDL 76 11/22/2017    LDL 99 11/22/2017    TRIG 188 (H) 11/22/2017       LIVER ENZYME RESULTS:  Lab Results   Component Value Date    AST 8 03/20/2017    ALT 21 03/20/2017       CBC RESULTS:  Lab Results   Component Value Date    WBC 6.3 02/18/2019    RBC 4.21 02/18/2019    HGB 12.9 " 02/18/2019    HCT 38.5 02/18/2019    MCV 91 02/18/2019    MCH 30.6 02/18/2019    MCHC 33.5 02/18/2019    RDW 12.1 02/18/2019     02/18/2019       BMP RESULTS:  Lab Results   Component Value Date     02/18/2019    POTASSIUM 3.8 02/18/2019    CHLORIDE 108 02/18/2019    CO2 27 02/18/2019    ANIONGAP 5 02/18/2019     (H) 02/18/2019    BUN 14 02/18/2019    CR 0.79 02/18/2019    GFRESTIMATED >90 02/18/2019    GFRESTBLACK >90 02/18/2019    GLORY 8.6 02/18/2019        A1C RESULTS:  No results found for: A1C    INR RESULTS:  Lab Results   Component Value Date    INR 0.95 02/18/2019           CC  Guevara Amos MD  [address]  [place] [address]

## 2019-05-29 NOTE — LETTER
5/29/2019      MD Marek Brady Consults [address]  [facility] [address]      RE: Shannen Frank       Dear Colleague,    I had the pleasure of seeing Shannen Frank in the [facility].    Service Date: 05/29/2019      HISTORY OF PRESENT ILLNESS:  Shannen returns for followup of hypertrophic cardiomyopathy.  She is a very pleasant 38-year-old lady who has just been diagnosed with this condition by myself.  She also has a history of a dermoid cyst on her right chest wall.  She recently underwent surgery for this and she was told the cyst could not be completely removed and she has been recommended to have a CT of her chest every 3 months.      From a cardiac point of view, she feels well.  She tells me that since the diagnosis of hypertrophic cardiomyopathy,  She has felt more palpitations.  She has 1-2 episodes of dizziness lasting for a few seconds which is nonexertional.  I reassured her that these symptoms are unlikely cardiac in origin.  She had a 24-hour Holter monitor which did not reveal anything more sinister than occasional ectopics, which corresponds to her symptoms of a slight pause followed by a more forceful heartbeat.  Benign nature of these symptoms were explained to her.  She had a cardiac MR which showed very minimal scarring, a 15 mm proximal thickening of the septum, systolic anterior motion of the mitral valve leaflets, resulting in mild mitral regurgitation.        Her cardiac examination today is unchanged from previously.  She does have a son and we have previously talked about screening.      ASSESSMENT AND PLAN:  In essence, this is a lady with hypertrophic cardiomyopathy who does not have any of the high-risk features.  I do not believe she has any symptoms from her condition.  As such, I do not think she needs any treatment.  She does mention that her blood pressure is always higher in our offices.  She was initially  mildly hypertensive when she arrived.  When I rechecked it, she is essentially normotensive.  I think some of this may certainly be driven by anxiety over her cardiac condition and I strongly reassured her that I think her prognosis should be excellent.      I do not think she has to have any more cardiac MRs in the near future.  She did not like this test.  However, she should have followup echocardiogram when I see her again in a year's time.         ANNY TREJO MD, [facility]             D: 2019   T: 2019   MT: CHARLES      Name:     HARVEY AMADOR   MRN:      [id number]        Account:      [id number]   :      1980           Service Date: 2019      Document: [id number]           Outpatient Encounter Medications as of 2019   Medication Sig Dispense Refill     Dextromethorphan-Guaifenesin (MUCINEX DM PO) Take 1 tablet by mouth every 12 hours        levonorgestrel (MIRENA) 20 MCG/24HR IUD 1 each (20 mcg) by Intrauterine route once for 1 dose  0     Loratadine (CLARITIN PO) Take 1 tablet by mouth every evening        Pseudoephedrine HCl (SUDAFED CONGESTION PO) Take 1 tablet by mouth daily        triamcinolone (KENALOG) 0.5 % cream Apply topically 2 times daily PRN Eczema       [DISCONTINUED] acetaminophen (TYLENOL) 325 MG tablet Take 3 tablets (975 mg) by mouth every 6 hours as needed for mild pain       [DISCONTINUED] HYDROmorphone (DILAUDID) 2 MG tablet Take 1-2 tablets (2-4 mg) by mouth every 4 hours as needed for moderate to severe pain (pain control or improvement in physical function. Hold dose for analgesic side effects.) 30 tablet 0     [DISCONTINUED] ibuprofen (ADVIL/MOTRIN) 200 MG tablet Take 3 tablets (600 mg) by mouth every 6 hours as needed for mild pain 100 tablet      [DISCONTINUED] senna-docusate (SENOKOT-S/PERICOLACE) 8.6-50 MG tablet Take 1-2 tablets by mouth 2 times daily as needed for constipation 24 tablet 0     No facility-administered encounter medications on file  as of 5/29/2019.        Again, thank you for allowing me to participate in the care of your patient.      Sincerely,    DR ANNY TREJO MD     [facility]

## 2019-05-29 NOTE — LETTER
5/29/2019    Sha Antonio MD  [facility] [address]  [facility] [address]    RE: Shannen Frank       Dear Colleague,    I had the pleasure of seeing Shannen Frank in the [facility].    HPI and Plan:   See dictation    Orders Placed This Encounter   Procedures     Follow-Up with Cardiologist     Echocardiogram Complete       No orders of the defined types were placed in this encounter.      Encounter Diagnoses   Name Primary?     Heart murmur      Hypertrophic cardiomyopathy (H)        CURRENT MEDICATIONS:  Current Outpatient Medications   Medication Sig Dispense Refill     Dextromethorphan-Guaifenesin (MUCINEX DM PO) Take 1 tablet by mouth every 12 hours        levonorgestrel (MIRENA) 20 MCG/24HR IUD 1 each (20 mcg) by Intrauterine route once for 1 dose  0     Loratadine (CLARITIN PO) Take 1 tablet by mouth every evening        Pseudoephedrine HCl (SUDAFED CONGESTION PO) Take 1 tablet by mouth daily        triamcinolone (KENALOG) 0.5 % cream Apply topically 2 times daily PRN Eczema         ALLERGIES   No Known Allergies    PAST MEDICAL HISTORY:  Past Medical History:   Diagnosis Date     Breast lump      Eczema      Granulomatous mastitis     left breast     Herpes genitalis      Migraine      Migraine with aura        PAST SURGICAL HISTORY:  Past Surgical History:   Procedure Laterality Date     INCISION AND DRAINAGE TRUNK, COMBINED Left 2/3/2017    Procedure: COMBINED INCISION AND DRAINAGE TRUNK;  Surgeon: Roxi Mcdonnell MD;  Location:  OR     REPAIR CHEST WALL Right 2/18/2019    Procedure: RIGHT ANTERIOR CHEST WALL RESECTION AND RECONSTRUCTION;  Surgeon: John English MD;  Location:  OR     TONSILLECTOMY & ADENOIDECTOMY         FAMILY HISTORY:  Family History   Problem Relation Age of Onset     Hypertension Mother      Hypertension Maternal Grandmother      Diabetes Type 2  Maternal Grandfather      Pacemaker Maternal  Grandfather      Diabetes Type 2  Maternal Aunt      Cancer Maternal Aunt         gynecologic     Myocardial Infarction No family hx of      Cerebrovascular Disease No family hx of      Coronary Artery Disease Early Onset No family hx of      Colon Cancer No family hx of      Breast Cancer No family hx of        SOCIAL HISTORY:  Social History     Socioeconomic History     Marital status:      Spouse name: None     Number of children: None     Years of education: None     Highest education level: None   Occupational History     Occupation:    Social Needs     Financial resource strain: None     Food insecurity:     Worry: None     Inability: None     Transportation needs:     Medical: None     Non-medical: None   Tobacco Use     Smoking status: Former Smoker     Types: Cigarettes     Smokeless tobacco: Never Used     Tobacco comment: social smoking in 20s only   Substance and Sexual Activity     Alcohol use: Yes     Frequency: 2-3 times a week     Drinks per session: 1 or 2     Comment: 2-3 glasses of wine, several days/week     Drug use: No     Sexual activity: Yes     Partners: Male     Birth control/protection: IUD     Comment: Mirena placed - November, 2017   Lifestyle     Physical activity:     Days per week: None     Minutes per session: None     Stress: None   Relationships     Social connections:     Talks on phone: None     Gets together: None     Attends Tenriism service: None     Active member of club or organization: None     Attends meetings of clubs or organizations: None     Relationship status: None     Intimate partner violence:     Fear of current or ex partner: None     Emotionally abused: None     Physically abused: None     Forced sexual activity: None   Other Topics Concern     Parent/sibling w/ CABG, MI or angioplasty before 65F 55M? Not Asked   Social History Narrative    Engaged (getting  in June, 2018).     Son (4 as of 2017) and step-daughter (19 as of 2017).     "Goes to Y at least 2x/week.        Review of Systems:  Skin:  Negative     Eyes:  Positive for glasses;contacts  ENT:  Positive for postnasal drainage  Respiratory:  Negative    Cardiovascular:    palpitations;Positive for  Gastroenterology: Positive for heartburn  Genitourinary:  Negative    Musculoskeletal:  Negative    Neurologic:  Negative    Psychiatric:  Positive for anxiety  Heme/Lymph/Imm:  Positive for allergies  Endocrine:  Negative      Physical Exam:  Vitals: /88   Pulse 88   Ht 1.753 m (5' 9\")   Wt 84.7 kg (186 lb 11.2 oz)   BMI 27.57 kg/m       Constitutional:  cooperative, alert and oriented, well developed, well nourished, in no acute distress        Skin:  warm and dry to the touch, no apparent skin lesions or masses noted   pacemaker incision in the left infraclavicular area was well-healed      Head:  normocephalic, no masses or lesions        Eyes:  pupils equal and round, conjunctivae and lids unremarkable, sclera white, no xanthalasma, EOMS intact, no nystagmus        Lymph:No Cervical lymphadenopathy present     ENT:  no pallor or cyanosis, dentition good        Neck:  carotid pulses are full and equal bilaterally, JVP normal, no carotid bruit        Respiratory:  normal breath sounds, clear to auscultation, normal A-P diameter, normal symmetry, normal respiratory excursion, no use of accessory muscles         Cardiac: regular rhythm;normal S1 and S2;apical impulse not displaced       systolic murmur;LLSB        pulses full and equal, no bruits auscultated                                        GI:  abdomen soft, non-tender, BS normoactive, no mass, no HSM, no bruits        Extremities and Muscular Skeletal:  no deformities, clubbing, cyanosis, erythema observed              Neurological:  no gross motor deficits        Psych:  Alert and Oriented x 3        Recent Lab Results:  LIPID RESULTS:  Lab Results   Component Value Date    CHOL 213 (H) 11/22/2017    HDL 76 11/22/2017    LDL " 99 11/22/2017    TRIG 188 (H) 11/22/2017       LIVER ENZYME RESULTS:  Lab Results   Component Value Date    AST 8 03/20/2017    ALT 21 03/20/2017       CBC RESULTS:  Lab Results   Component Value Date    WBC 6.3 02/18/2019    RBC 4.21 02/18/2019    HGB 12.9 02/18/2019    HCT 38.5 02/18/2019    MCV 91 02/18/2019    MCH 30.6 02/18/2019    MCHC 33.5 02/18/2019    RDW 12.1 02/18/2019     02/18/2019       BMP RESULTS:  Lab Results   Component Value Date     02/18/2019    POTASSIUM 3.8 02/18/2019    CHLORIDE 108 02/18/2019    CO2 27 02/18/2019    ANIONGAP 5 02/18/2019     (H) 02/18/2019    BUN 14 02/18/2019    CR 0.79 02/18/2019    GFRESTIMATED >90 02/18/2019    GFRESTBLACK >90 02/18/2019    GLORY 8.6 02/18/2019        A1C RESULTS:  No results found for: A1C    INR RESULTS:  Lab Results   Component Value Date    INR 0.95 02/18/2019           CC  Anny Amos MD  [address]  [place] [address]                  Thank you for allowing me to participate in the care of your patient.      Sincerely,     DR ANNY AMOS MD     [facility]    cc:   Anny Amos MD  [address]  [place] [address]

## 2019-05-29 NOTE — PROGRESS NOTES
Service Date: 05/29/2019      HISTORY OF PRESENT ILLNESS:  Shannen returns for followup of hypertrophic cardiomyopathy.  She is a very pleasant 38-year-old lady who has just been diagnosed with this condition by myself.  She also has a history of a dermoid cyst on her right chest wall.  She recently underwent surgery for this and she was told the cyst could not be completely removed and she has been recommended to have a CT of her chest every 3 months.      From a cardiac point of view, she feels well.  She tells me that since the diagnosis of hypertrophic cardiomyopathy,  She has felt more palpitations.  She has 1-2 episodes of dizziness lasting for a few seconds which is nonexertional.  I reassured her that these symptoms are unlikely cardiac in origin.  She had a 24-hour Holter monitor which did not reveal anything more sinister than occasional ectopics, which corresponds to her symptoms of a slight pause followed by a more forceful heartbeat.  Benign nature of these symptoms were explained to her.  She had a cardiac MR which showed very minimal scarring, a 15 mm proximal thickening of the septum, systolic anterior motion of the mitral valve leaflets, resulting in mild mitral regurgitation.        Her cardiac examination today is unchanged from previously.  She does have a son and we have previously talked about screening.      ASSESSMENT AND PLAN:  In essence, this is a lady with hypertrophic cardiomyopathy who does not have any of the high-risk features.  I do not believe she has any symptoms from her condition.  As such, I do not think she needs any treatment.  She does mention that her blood pressure is always higher in our offices.  She was initially mildly hypertensive when she arrived.  When I rechecked it, she is essentially normotensive.  I think some of this may certainly be driven by anxiety over her cardiac condition and I strongly reassured her that I think her prognosis should be excellent.      I  do not think she has to have any more cardiac MRs in the near future.  She did not like this test.  However, she should have followup echocardiogram when I see her again in a year's time.         ANNY TREJO MD, [facility]             D: 2019   T: 2019   MT: CHARLES      Name:     HARVEY AMADOR   MRN:      [id number]        Account:      [id number]   :      1980           Service Date: 2019      Document: [id number]

## 2019-07-02 ENCOUNTER — OFFICE VISIT (OUTPATIENT)
Dept: URGENT CARE | Facility: URGENT CARE | Age: 39
End: 2019-07-02
Payer: COMMERCIAL

## 2019-07-02 VITALS
WEIGHT: 183 LBS | DIASTOLIC BLOOD PRESSURE: 80 MMHG | OXYGEN SATURATION: 98 % | SYSTOLIC BLOOD PRESSURE: 128 MMHG | TEMPERATURE: 99 F | BODY MASS INDEX: 27.02 KG/M2 | RESPIRATION RATE: 16 BRPM | HEART RATE: 86 BPM

## 2019-07-02 DIAGNOSIS — R09.82 POST-NASAL DRIP: ICD-10-CM
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
## 2019-07-02 PROCEDURE — 99214 OFFICE O/P EST MOD 30 MIN: CPT | Performed by: PHYSICIAN ASSISTANT

## 2019-07-02 RX ORDER — DOXYCYCLINE 100 MG/1
CAPSULE ORAL
Qty: 2 CAPSULE | Refills: 0 | Status: SHIPPED | OUTPATIENT
Start: 2019-07-02

## 2019-07-02 NOTE — PATIENT INSTRUCTIONS
(W57.XXXA) Tick bite, initial encounter  (primary encounter diagnosis)  Comment:   Plan: doxycycline hyclate (VIBRAMYCIN) 100 MG capsule            (R09.82) Post-nasal drip  Comment: secondary to allergies not well controlled  Plan: increase loratadine to twice a day.  Should symptoms persist at night you may try benadryl 25 mg at bedtime.      Follow up with primary clinic in one month to check lyme test.

## 2019-07-02 NOTE — PROGRESS NOTES
Patient presents with:  Insect Bites: Patient has a dark red ring on her right arm on 07/01/2019. Warm to the touch  Cough: Patient c/o cough      SUBJECTIVE:   Shannen Frank is a 39 year old female presenting with a chief complaint of   1) bullseye rash since yesterday  2) morning cough for over a week, productive.    3) post nasal drip.    4) runny nose and some sneezing.      Onset of symptoms was as above.  Course of illness is worsening.    Severity moderate  Current and Associated symptoms: as above.  No fevers  Treatment measures tried include None tried.  Predisposing factors include ?tick bite?    Past Medical History:   Diagnosis Date     Breast lump      Eczema      Granulomatous mastitis     left breast     Herpes genitalis      Migraine      Migraine with aura      Patient Active Problem List   Diagnosis     Granulomatous mastitis     Migraine with aura     Eczema     Mass of right chest wall     Social History     Tobacco Use     Smoking status: Former Smoker     Types: Cigarettes     Smokeless tobacco: Never Used     Tobacco comment: social smoking in 20s only   Substance Use Topics     Alcohol use: Yes     Frequency: 2-3 times a week     Drinks per session: 1 or 2     Comment: 2-3 glasses of wine, several days/week       ROS:  CONSTITUTIONAL:NEGATIVE for fever, chills, change in weight  INTEGUMENTARY/SKIN: as per HPI  ENT/MOUTH: as per HPI  RESP:as per HPI  CV: NEGATIVE for chest pain, palpitations or peripheral edema  MUSCULOSKELETAL: NEGATIVE for significant arthralgias or myalgia  NEURO: NEGATIVE for weakness, dizziness or paresthesias  HEME/ALLERGY/IMMUNE: NEGATIVE for bleeding problems  Review of systems negative except as stated above.    OBJECTIVE  :/80   Pulse 86   Temp 99  F (37.2  C) (Oral)   Resp 16   Wt 83 kg (183 lb)   SpO2 98%   BMI 27.02 kg/m    GENERAL APPEARANCE: healthy, alert and no distress  EYES: EOMI,  PERRL, conjunctiva clear  HENT: ear canals and TM's normal.   Nose and mouth without ulcers, erythema or lesions  HENT: nasal turbinates boggy with bluish hue and rhinorrhea clear  NECK: supple, nontender, no lymphadenopathy  RESP: lungs clear to auscultation - no rales, rhonchi or wheezes  CV: regular rates and rhythm, normal S1 S2, no murmur noted  ABDOMEN:  soft, nontender, no HSM or masses and bowel sounds normal  NEURO: Normal strength and tone, sensory exam grossly normal,  normal speech and mentation  SKIN: erythematous raised lesion on right forearm without open wound or drainage.      (W57.XXXA) Tick bite, initial encounter  (primary encounter diagnosis)  Comment:   Plan: doxycycline hyclate (VIBRAMYCIN) 100 MG capsule            (R09.82) Post-nasal drip  Comment: secondary to allergies not well controlled  Plan: increase loratadine to twice a day.  Should symptoms persist at night you may try benadryl 25 mg at bedtime.      Follow up with primary clinic in one month to check lyme test.

## 2019-11-04 ENCOUNTER — TRANSFERRED RECORDS (OUTPATIENT)
Dept: HEALTH INFORMATION MANAGEMENT | Facility: CLINIC | Age: 39
End: 2019-11-04

## 2019-12-02 ENCOUNTER — HOSPITAL ENCOUNTER (OUTPATIENT)
Dept: PHYSICAL THERAPY | Facility: CLINIC | Age: 39
Setting detail: THERAPIES SERIES
End: 2019-12-02
Attending: THORACIC SURGERY (CARDIOTHORACIC VASCULAR SURGERY)
Payer: COMMERCIAL

## 2019-12-02 PROCEDURE — 97161 PT EVAL LOW COMPLEX 20 MIN: CPT | Mod: GP | Performed by: PHYSICAL THERAPIST

## 2019-12-02 PROCEDURE — 97110 THERAPEUTIC EXERCISES: CPT | Mod: GP | Performed by: PHYSICAL THERAPIST

## 2019-12-09 ENCOUNTER — HOSPITAL ENCOUNTER (OUTPATIENT)
Dept: PHYSICAL THERAPY | Facility: CLINIC | Age: 39
Setting detail: THERAPIES SERIES
End: 2019-12-09
Attending: THORACIC SURGERY (CARDIOTHORACIC VASCULAR SURGERY)
Payer: COMMERCIAL

## 2019-12-09 PROCEDURE — 97110 THERAPEUTIC EXERCISES: CPT | Mod: GP | Performed by: PHYSICAL THERAPIST

## 2019-12-11 ENCOUNTER — MEDICAL CORRESPONDENCE (OUTPATIENT)
Dept: HEALTH INFORMATION MANAGEMENT | Facility: CLINIC | Age: 39
End: 2019-12-11

## 2019-12-17 ENCOUNTER — HOSPITAL ENCOUNTER (OUTPATIENT)
Dept: OCCUPATIONAL THERAPY | Facility: CLINIC | Age: 39
Setting detail: THERAPIES SERIES
End: 2019-12-17
Attending: THORACIC SURGERY (CARDIOTHORACIC VASCULAR SURGERY)
Payer: COMMERCIAL

## 2019-12-17 PROCEDURE — 97165 OT EVAL LOW COMPLEX 30 MIN: CPT | Mod: GO

## 2019-12-17 PROCEDURE — 97140 MANUAL THERAPY 1/> REGIONS: CPT | Mod: GO

## 2019-12-17 PROCEDURE — 97535 SELF CARE MNGMENT TRAINING: CPT | Mod: GO

## 2019-12-19 NOTE — PROGRESS NOTES
"   12/17/19 1330   Rehab Discipline   Discipline OT   Type of Visit   Type of visit Initial Edema Evaluation   General Information   Start of care 12/17/19   Referring physician John Bermudez MD   Orders Evaluate and treat as indicated   Order date 12/11/19   Medical diagnosis desmoid tumor s/p resection of right anterior chest wall mass   Edema onset 12/11/19   Affected body parts Trunk   Edema etiology comments re-section and reconstruct of chest wall Feb. 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 Patient currently seeing PT for strengthening, exercised 2x/week   Surgical / medical history reviewed Yes   Prior level of functional mobility independent   Patient role / employment history Employed  ()   Living environment House / [facility], [place]   Living environment comments lives with spouse and children   System Outcome Measures   Outcome Measures Lymphedema   Lymphedema Life Impact Scale (score range 0-72). A higher score indicates greater impairment.   (NA)   Subjective Report   Patient report of symptoms decreased strength RUE \"I can't push down on the salad spinner\" discomfort with prolonged activity such as clapping, tightness/pulling through medial chest and shoulder   Patient / Family Goals   Patient / family goals statement \"I feel like I should get some relief from this this far out of surgery, it helped when I had somebody do some (manual therapy) on me at work\"   Pain   Pain comments intermittent pain with activity medial chest/RUQ   Cognitive Status   Orientation Orientation to person, place and time   Level of consciousness Alert   Follows commands and answers questions 100% of the time   Personal safety and judgement " "Intact   Memory Intact   Edema Exam / Assessment   Skin condition comments Skin intact with scarring and some adhesion along R medial breast tissue, multiple scars L breast 2/2 surgeries for granulomatous mastitis; L breast visibly smaller 2/2 to surgeries \"I've decreased at least 1/2 a cup size\"  Very mild swelling superior R breast.   Girth Measurements   Girth Measurements   (NA)   Range of Motion   ROM comments BUE AROM WNL   Activities of Daily Living   Activities of Daily Living independent   Muscle Tone   Muscle tone comments decreased strength RUE/RUQ   Planned Edema Interventions   Planned edema interventions Manual lymph drainage;Exercises;Education;Manual therapy;Scar mobilization;Soft tissue mobilization;Myofascial release;Home management program development   Planned edema intervention comments recommend 1x/week/3   Clinical Impression   Criteria for skilled therapeutic intervention met Yes   Therapy diagnosis scar adhesions RUQ weakness secondary to chest wall re-section and reconstruction   Influenced by the following impairments / conditions Other   Assessment of Occupational Performance 1-3 Performance Deficits   Identified Performance Deficits decreased RUQ strength affecting IADL   Clinical Decision Making (Complexity) Low complexity   Treatment Frequency 1x/week   Treatment duration 3 weeks   Patient / family and/or staff in agreement with plan of care Yes   Risks and benefits of therapy have been explained Yes   Goals   Edema Eval Goals 1;2   Goal 1   Goal identifier scar mobility/mm tone   Goal description For increased participation in I/ADL, and reinnervation/revascularization of scar tissue, patient will have at least a 50% improvement in scar tissue mobility and decreased tone in R pec mm   Target date 02/17/20   Goal 2   Goal identifier home program   Goal description Patient will be independent in HP including HEP and scar massage   Target date 02/17/20   Total Evaluation Time   OT Nery, " "Low Complexity Minutes (74710) 15        12/17/19 7550   Rehab Discipline   Discipline OT   Type of Visit   Type of visit Initial Edema Evaluation   General Information   Start of care 12/17/19   Referring physician John Bermudez MD   Orders Evaluate and treat as indicated   Order date 12/11/19   Medical diagnosis desmoid tumor s/p resection of right anterior chest wall mass   Edema onset 12/11/19   Affected body parts Trunk   Edema etiology comments re-section and reconstruct of chest wall Feb. 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 Patient currently seeing PT for strengthening, exercised 2x/week   Surgical / medical history reviewed Yes   Prior level of functional mobility independent   Patient role / employment history Employed  ()   Living environment House / [facility]   Living environment comments lives with spouse and children   System Outcome Measures   Outcome Measures Lymphedema   Lymphedema Life Impact Scale (score range 0-72). A higher score indicates greater impairment.   (NA)   Subjective Report   Patient report of symptoms decreased strength RUE \"I can't push down on the salad spinner\" discomfort with prolonged activity such as clapping, tightness/pulling through medial chest and shoulder   Patient / Family Goals   Patient / family goals statement \"I feel like I should get some relief from this this far out of surgery, it helped when I had somebody do some (manual therapy) on me at work\"   Pain   Pain comments intermittent pain with activity medial chest/RUQ   Cognitive Status   Orientation Orientation to person, place and time   Level of consciousness Alert   Follows commands and answers questions 100% of the " "time   Personal safety and judgement Intact   Memory Intact   Edema Exam / Assessment   Skin condition comments Skin intact with scarring and some adhesion along R medial breast tissue, multiple scars L breast 2/2 surgeries for granulomatous mastitis; L breast visibly smaller 2/2 to surgeries \"I've decreased at least 1/2 a cup size\"  Very mild swelling superior R breast.   Girth Measurements   Girth Measurements   (NA)   Range of Motion   ROM comments BUE AROM WNL   Activities of Daily Living   Activities of Daily Living independent   Muscle Tone   Muscle tone comments decreased strength RUE/RUQ   Planned Edema Interventions   Planned edema interventions Manual lymph drainage;Exercises;Education;Manual therapy;Scar mobilization;Soft tissue mobilization;Myofascial release;Home management program development   Planned edema intervention comments recommend 1x/week/3   Clinical Impression   Criteria for skilled therapeutic intervention met Yes   Therapy diagnosis scar adhesions RUQ weakness secondary to chest wall re-section and reconstruction   Influenced by the following impairments / conditions Other   Assessment of Occupational Performance 1-3 Performance Deficits   Identified Performance Deficits decreased RUQ strength affecting IADL   Clinical Decision Making (Complexity) Low complexity   Treatment Frequency 1x/week   Treatment duration 3 weeks   Patient / family and/or staff in agreement with plan of care Yes   Risks and benefits of therapy have been explained Yes   Goals   Edema Eval Goals 1;2   Goal 1   Goal identifier scar mobility/mm tone   Goal description For increased participation in I/ADL, and reinnervation/revascularization of scar tissue, patient will have at least a 50% improvement in scar tissue mobility and decreased tone in R pec mm   Target date 02/17/20   Goal 2   Goal identifier home program   Goal description Patient will be independent in HP including HEP and scar massage   Target date " 02/17/20   Total Evaluation Time   OT Eval, Low Complexity Minutes (45029) 15

## 2019-12-26 ENCOUNTER — HOSPITAL ENCOUNTER (OUTPATIENT)
Dept: PHYSICAL THERAPY | Facility: CLINIC | Age: 39
Setting detail: THERAPIES SERIES
End: 2019-12-26
Attending: THORACIC SURGERY (CARDIOTHORACIC VASCULAR SURGERY)
Payer: COMMERCIAL

## 2019-12-26 PROCEDURE — 97110 THERAPEUTIC EXERCISES: CPT | Mod: GP | Performed by: PHYSICAL THERAPIST

## 2020-03-01 ENCOUNTER — HEALTH MAINTENANCE LETTER (OUTPATIENT)
Age: 40
End: 2020-03-01

## 2020-06-09 NOTE — PROGRESS NOTES
06/09/20 1100   Signing Clinician's Name / Credentials   Signing clinician's name / credentials Nirav Paredes OTR/TRAVIS, CLT   Session Number   Session Number DISCHARGE NOTE   Comments   Comments Patient presented for one time evaluation and treatment only, did not schedule further appointments.  Unable to determine final progress toward goals.  Discharge from edema treatment.

## 2020-12-14 ENCOUNTER — HEALTH MAINTENANCE LETTER (OUTPATIENT)
Age: 40
End: 2020-12-14

## 2021-01-15 ENCOUNTER — HEALTH MAINTENANCE LETTER (OUTPATIENT)
Age: 41
End: 2021-01-15

## 2021-03-12 ENCOUNTER — OFFICE VISIT (OUTPATIENT)
Dept: CARDIOLOGY | Facility: CLINIC | Age: 41
End: 2021-03-12
Payer: COMMERCIAL

## 2021-03-12 VITALS
BODY MASS INDEX: 27.25 KG/M2 | SYSTOLIC BLOOD PRESSURE: 158 MMHG | HEIGHT: 69 IN | WEIGHT: 184 LBS | HEART RATE: 96 BPM | DIASTOLIC BLOOD PRESSURE: 82 MMHG

## 2021-03-12 DIAGNOSIS — I42.2 HYPERTROPHIC CARDIOMYOPATHY (H): Primary | ICD-10-CM

## 2021-03-12 DIAGNOSIS — I10 ESSENTIAL HYPERTENSION: ICD-10-CM

## 2021-03-12 PROCEDURE — 99214 OFFICE O/P EST MOD 30 MIN: CPT | Performed by: INTERNAL MEDICINE

## 2021-03-12 ASSESSMENT — MIFFLIN-ST. JEOR: SCORE: 1559

## 2021-03-12 NOTE — PROGRESS NOTES
HPI and Plan:   See dictation    Orders Placed This Encounter   Procedures     Follow-Up with Cardiologist     Echocardiogram Complete       No orders of the defined types were placed in this encounter.      Encounter Diagnoses   Name Primary?     Hypertrophic cardiomyopathy (H) Yes     Essential hypertension        CURRENT MEDICATIONS:  Current Outpatient Medications   Medication Sig Dispense Refill     Dextromethorphan-Guaifenesin (MUCINEX DM PO) Take 1 tablet by mouth every 12 hours        Loratadine (CLARITIN PO) Take 1 tablet by mouth every evening        doxycycline hyclate (VIBRAMYCIN) 100 MG capsule 2 pills now (Patient not taking: Reported on 3/12/2021) 2 capsule 0     levonorgestrel (MIRENA) 20 MCG/24HR IUD 1 each (20 mcg) by Intrauterine route once for 1 dose  0     Pseudoephedrine HCl (SUDAFED CONGESTION PO) Take 1 tablet by mouth daily        triamcinolone (KENALOG) 0.5 % cream Apply topically 2 times daily PRN Eczema         ALLERGIES   No Known Allergies    PAST MEDICAL HISTORY:  Past Medical History:   Diagnosis Date     Breast lump      Eczema      Granulomatous mastitis     left breast     Herpes genitalis      Migraine      Migraine with aura        PAST SURGICAL HISTORY:  Past Surgical History:   Procedure Laterality Date     INCISION AND DRAINAGE TRUNK, COMBINED Left 2/3/2017    Procedure: COMBINED INCISION AND DRAINAGE TRUNK;  Surgeon: Roxi Mcdonnell MD;  Location:  OR     REPAIR CHEST WALL Right 2/18/2019    Procedure: RIGHT ANTERIOR CHEST WALL RESECTION AND RECONSTRUCTION;  Surgeon: John English MD;  Location:  OR     TONSILLECTOMY & ADENOIDECTOMY         FAMILY HISTORY:  Family History   Problem Relation Age of Onset     Hypertension Mother      Hypertension Maternal Grandmother      Diabetes Type 2  Maternal Grandfather      Pacemaker Maternal Grandfather      Diabetes Type 2  Maternal Aunt      Cancer Maternal Aunt         gynecologic     Myocardial Infarction No family  hx of      Cerebrovascular Disease No family hx of      Coronary Artery Disease Early Onset No family hx of      Colon Cancer No family hx of      Breast Cancer No family hx of        SOCIAL HISTORY:  Social History     Socioeconomic History     Marital status:      Spouse name: None     Number of children: None     Years of education: None     Highest education level: None   Occupational History     Occupation:    Social Needs     Financial resource strain: None     Food insecurity     Worry: None     Inability: None     Transportation needs     Medical: None     Non-medical: None   Tobacco Use     Smoking status: Former Smoker     Types: Cigarettes     Smokeless tobacco: Never Used     Tobacco comment: social smoking in 20s only   Substance and Sexual Activity     Alcohol use: Yes     Frequency: 2-3 times a week     Drinks per session: 1 or 2     Drug use: No     Sexual activity: Yes     Partners: Male     Birth control/protection: I.U.D.     Comment: Mirena placed - November, 2017   Lifestyle     Physical activity     Days per week: None     Minutes per session: None     Stress: None   Relationships     Social connections     Talks on phone: None     Gets together: None     Attends L[employer] day service: None     Active member of club or organization: None     Attends meetings of clubs or organizations: None     Relationship status: None     Intimate partner violence     Fear of current or ex partner: None     Emotionally abused: None     Physically abused: None     Forced sexual activity: None   Other Topics Concern     Parent/sibling w/ CABG, MI or angioplasty before 65F 55M? Not Asked   Social History Narrative    Engaged (getting  in June, 2018).     Son (4 as of 2017) and step-daughter (19 as of 2017).    Goes to [facility] at least 2x/week.        Review of Systems:  Skin:  Negative     Eyes:  Negative    ENT:  Negative    Respiratory:  Positive for dyspnea on exertion  Cardiovascular:     "Positive for;palpitations  Gastroenterology: Negative    Genitourinary:  Negative    Musculoskeletal:  Negative    Neurologic:  Negative    Psychiatric:  Positive for anxiety  Heme/Lymph/Imm:  Negative    Endocrine:  Negative      Physical Exam:  Vitals: BP (!) 158/82 (BP Location: Left arm)   Pulse 96   Ht 1.753 m (5' 9\")   Wt 83.5 kg (184 lb)   BMI 27.17 kg/m      Constitutional:  cooperative, alert and oriented, well developed, well nourished, in no acute distress        Skin:  warm and dry to the touch, no apparent skin lesions or masses noted   pacemaker incision in the left infraclavicular area was well-healed      Head:  normocephalic, no masses or lesions        Eyes:  pupils equal and round, conjunctivae and lids unremarkable, sclera white, no xanthalasma, EOMS intact, no nystagmus        Lymph:No Cervical lymphadenopathy present     ENT:  no pallor or cyanosis, dentition good        Neck:  carotid pulses are full and equal bilaterally, JVP normal, no carotid bruit        Respiratory:  normal breath sounds, clear to auscultation, normal A-P diameter, normal symmetry, normal respiratory excursion, no use of accessory muscles         Cardiac: regular rhythm;normal S1 and S2;apical impulse not displaced       systolic murmur;LLSB        pulses full and equal, no bruits auscultated                                        GI:  abdomen soft, non-tender, BS normoactive, no mass, no HSM, no bruits        Extremities and Muscular Skeletal:  no deformities, clubbing, cyanosis, erythema observed              Neurological:  no gross motor deficits        Psych:  Alert and Oriented x 3        Recent Lab Results:  LIPID RESULTS:  Lab Results   Component Value Date    CHOL 213 (H) 11/22/2017    HDL 76 11/22/2017    LDL 99 11/22/2017    TRIG 188 (H) 11/22/2017       LIVER ENZYME RESULTS:  Lab Results   Component Value Date    AST 8 03/20/2017    ALT 21 03/20/2017       CBC RESULTS:  Lab Results   Component Value Date    " WBC 6.3 02/18/2019    RBC 4.21 02/18/2019    HGB 12.9 02/18/2019    HCT 38.5 02/18/2019    MCV 91 02/18/2019    MCH 30.6 02/18/2019    MCHC 33.5 02/18/2019    RDW 12.1 02/18/2019     02/18/2019       BMP RESULTS:  Lab Results   Component Value Date     02/18/2019    POTASSIUM 3.8 02/18/2019    CHLORIDE 108 02/18/2019    CO2 27 02/18/2019    ANIONGAP 5 02/18/2019     (H) 02/18/2019    BUN 14 02/18/2019    CR 0.79 02/18/2019    GFRESTIMATED >90 02/18/2019    GFRESTBLACK >90 02/18/2019    GLORY 8.6 02/18/2019        A1C RESULTS:  No results found for: A1C    INR RESULTS:  Lab Results   Component Value Date    INR 0.95 02/18/2019           CC  No referring provider defined for this encounter.

## 2021-03-12 NOTE — LETTER
3/12/2021    Sha Antonio MD  [facility] [address] [facility] [address]  [facility] [address]    RE: Shannen Frank       Dear Colleague,    I had the pleasure of seeing Shannen Frank in the [facility].    HPI and Plan:   See dictation    Orders Placed This Encounter   Procedures     Follow-Up with Cardiologist     Echocardiogram Complete       No orders of the defined types were placed in this encounter.      Encounter Diagnoses   Name Primary?     Hypertrophic cardiomyopathy (H) Yes     Essential hypertension        CURRENT MEDICATIONS:  Current Outpatient Medications   Medication Sig Dispense Refill     Dextromethorphan-Guaifenesin (MUCINEX DM PO) Take 1 tablet by mouth every 12 hours        Loratadine (CLARITIN PO) Take 1 tablet by mouth every evening        doxycycline hyclate (VIBRAMYCIN) 100 MG capsule 2 pills now (Patient not taking: Reported on 3/12/2021) 2 capsule 0     levonorgestrel (MIRENA) 20 MCG/24HR IUD 1 each (20 mcg) by Intrauterine route once for 1 dose  0     Pseudoephedrine HCl (SUDAFED CONGESTION PO) Take 1 tablet by mouth daily        triamcinolone (KENALOG) 0.5 % cream Apply topically 2 times daily PRN Eczema         ALLERGIES   No Known Allergies    PAST MEDICAL HISTORY:  Past Medical History:   Diagnosis Date     Breast lump      Eczema      Granulomatous mastitis     left breast     Herpes genitalis      Migraine      Migraine with aura        PAST SURGICAL HISTORY:  Past Surgical History:   Procedure Laterality Date     INCISION AND DRAINAGE TRUNK, COMBINED Left 2/3/2017    Procedure: COMBINED INCISION AND DRAINAGE TRUNK;  Surgeon: Roxi Mcdonnell MD;  Location:  OR     REPAIR CHEST WALL Right 2/18/2019    Procedure: RIGHT ANTERIOR CHEST WALL RESECTION AND RECONSTRUCTION;  Surgeon: John English MD;  Location:  OR     TONSILLECTOMY & ADENOIDECTOMY         FAMILY HISTORY:  Family History   Problem  Relation Age of Onset     Hypertension Mother      Hypertension Maternal Grandmother      Diabetes Type 2  Maternal Grandfather      Pacemaker Maternal Grandfather      Diabetes Type 2  Maternal Aunt      Cancer Maternal Aunt         gynecologic     Myocardial Infarction No family hx of      Cerebrovascular Disease No family hx of      Coronary Artery Disease Early Onset No family hx of      Colon Cancer No family hx of      Breast Cancer No family hx of        SOCIAL HISTORY:  Social History     Socioeconomic History     Marital status:      Spouse name: None     Number of children: None     Years of education: None     Highest education level: None   Occupational History     Occupation:    Social Needs     Financial resource strain: None     Food insecurity     Worry: None     Inability: None     Transportation needs     Medical: None     Non-medical: None   Tobacco Use     Smoking status: Former Smoker     Types: Cigarettes     Smokeless tobacco: Never Used     Tobacco comment: social smoking in 20s only   Substance and Sexual Activity     Alcohol use: Yes     Frequency: 2-3 times a week     Drinks per session: 1 or 2     Drug use: No     Sexual activity: Yes     Partners: Male     Birth control/protection: I.U.D.     Comment: Mirena placed - November, 2017   Lifestyle     Physical activity     Days per week: None     Minutes per session: None     Stress: None   Relationships     Social connections     Talks on phone: None     Gets together: None     Attends Hoahaoism service: None     Active member of club or organization: None     Attends meetings of clubs or organizations: None     Relationship status: None     Intimate partner violence     Fear of current or ex partner: None     Emotionally abused: None     Physically abused: None     Forced sexual activity: None   Other Topics Concern     Parent/sibling w/ CABG, MI or angioplasty before 65F 55M? Not Asked   Social History Narrative     "Engaged (getting  in June, 2018).     Son (4 as of 2017) and step-daughter (19 as of 2017).    [place]es to [place] at least 2x/[place].        Review of Systems:  Skin:  Negative     Eyes:  Negative    ENT:  Negative    Respiratory:  Positive for dyspnea on exertion  Cardiovascular:    Positive for;palpitations  Gastroenterology: Negative    Genitourinary:  Negative    Musculoskeletal:  Negative    Neurologic:  Negative    Psychiatric:  Positive for anxiety  Heme/Lymph/Imm:  Negative    Endocrine:  Negative      Physical Exam:  Vitals: BP (!) 158/82 (BP Location: Left arm)   Pulse 96   Ht 1.753 m (5' 9\")   Wt 83.5 kg (184 lb)   BMI 27.17 kg/m      Constitutional:  cooperative, alert and oriented, well developed, well nourished, in no acute distress        Skin:  warm and dry to the touch, no apparent skin lesions or masses noted   pacemaker incision in the left infraclavicular area was well-healed      Head:  normocephalic, no masses or lesions        Eyes:  pupils equal and round, conjunctivae and lids unremarkable, sclera white, no xanthalasma, EOMS intact, no nystagmus        Lymph:No Cervical lymphadenopathy present     ENT:  no pallor or cyanosis, dentition good        Neck:  carotid pulses are full and equal bilaterally, JVP normal, no carotid bruit        Respiratory:  normal breath sounds, clear to auscultation, normal A-P diameter, normal symmetry, normal respiratory excursion, no use of accessory muscles         Cardiac: regular rhythm;normal S1 and S2;apical impulse not displaced       systolic murmur;LLSB        pulses full and equal, no bruits auscultated                                        GI:  abdomen soft, non-tender, BS normoactive, no mass, no HSM, no bruits        Extremities and Muscular Skeletal:  no deformities, clubbing, cyanosis, erythema observed              Neurological:  no gross motor deficits        Psych:  Alert and Oriented x 3        Recent Lab Results:  LIPID RESULTS:  Lab Results "   Component Value Date    CHOL 213 (H) 11/22/2017    HDL 76 11/22/2017    LDL 99 11/22/2017    TRIG 188 (H) 11/22/2017       LIVER ENZYME RESULTS:  Lab Results   Component Value Date    AST 8 03/20/2017    ALT 21 03/20/2017       CBC RESULTS:  Lab Results   Component Value Date    WBC 6.3 02/18/2019    RBC 4.21 02/18/2019    HGB 12.9 02/18/2019    HCT 38.5 02/18/2019    MCV 91 02/18/2019    MCH 30.6 02/18/2019    MCHC 33.5 02/18/2019    RDW 12.1 02/18/2019     02/18/2019       BMP RESULTS:  Lab Results   Component Value Date     02/18/2019    POTASSIUM 3.8 02/18/2019    CHLORIDE 108 02/18/2019    CO2 27 02/18/2019    ANIONGAP 5 02/18/2019     (H) 02/18/2019    BUN 14 02/18/2019    CR 0.79 02/18/2019    GFRESTIMATED >90 02/18/2019    GFRESTBLACK >90 02/18/2019    GLORY 8.6 02/18/2019        A1C RESULTS:  No results found for: A1C    INR RESULTS:  Lab Results   Component Value Date    INR 0.95 02/18/2019     Thank you for allowing me to participate in the care of your patient.      Sincerely,     DR ANNY TREJO MD     [facility]    cc:   No referring provider defined for this encounter.

## 2021-03-12 NOTE — PROGRESS NOTES
Service Date: 03/12/2021      HISTORY OF PRESENT ILLNESS:  It is a pleasure for me to see Shannen again for followup of hypertrophic cardiomyopathy.  She was also here to discuss her blood pressure.      I am happy to report that this is a lady who has benign features of hypertrophic cardiomyopathy.  There is no significant LVOT obstruction or mitral regurgitation.  Septal thickness is 15 mm, and MRI showed 2% scar burden mainly affecting the inferoseptal at the RV insertion points.  There is no family history of hypertrophic cardiomyopathy or sudden death.  There is a family history of hypertension.  Her mother has been on blood pressure medications since the age of 30.  Sisters have preeclampsia.  The patient herself has had gestational hypertension.      She leads a very busy life.  She feels the anxiety.  She tells me she is constantly on the go and does not have enough rest.  She has started a new business.  She does have a low-salt diet and she eats well.  She has been measuring her blood pressures.  She can measure them several times a day.  I do see 1 or 2 readings at around 160 systolic, but these constitute less than 10% of all the readings that she takes.  Most of the readings are between 140-150 systolic, with diastolics between .  I certainly feel that this may be related to stress, but there is a good likelihood that she has mild hypertension.  At this stage, I would certainly like to try further with hygienic measures.  I have recommended meditation and yoga and more regular exercise promoting weight loss.  I think there is a good likelihood that we may need medications in the long-term, but I would certainly like to delay starting that for as long as possible.  She will continue to monitor her blood pressure at home, but I asked her to monitor it once or twice a day and not multiple times.  If her blood pressure is above 150/90 with 1 reading, she can certainly remeasure it again.      Her  cardiac examination is unremarkable.  She has concerns with her ability to occasionally see her heartbeat, manifesting as a rise and fall of the chest wall.  I reassured her that her cardiac size is normal and there is nothing to worry about.      I would like to see her again in 6 months' time for further followup of her blood pressure.  She can have her echocardiogram for followup of her hypertrophic cardiomyopathy at that time as well.  It is certainly a pleasure to see this young lady again.         ANNY TREJO MD, [facility]             D: 2021   T: 2021   MT: DEXTER      Name:     HARVEY AMADOR   MRN:      [id number]        Account:      [id number]   :      1980           Service Date: 2021      Document: [id number]

## 2021-04-18 ENCOUNTER — HEALTH MAINTENANCE LETTER (OUTPATIENT)
Age: 41
End: 2021-04-18

## 2021-10-02 ENCOUNTER — HEALTH MAINTENANCE LETTER (OUTPATIENT)
Age: 41
End: 2021-10-02

## 2022-05-14 ENCOUNTER — HEALTH MAINTENANCE LETTER (OUTPATIENT)
Age: 42
End: 2022-05-14

## 2022-09-03 ENCOUNTER — HEALTH MAINTENANCE LETTER (OUTPATIENT)
Age: 42
End: 2022-09-03

## 2023-01-01 NOTE — DISCHARGE INSTRUCTIONS
After Your Breast Biopsy    Bleeding or bruising: Slight bruising is normal.  If you bleed through the bandage, put direct pressure on the breast.  If you are still bleeding after 20 minutes, call the doctor who ordered the exam.    Bandages: Keep your bandage in place until tomorrow morning.  Do not get it wet.    On the second day, cover it with a Band-Aid.    Activity: You may shower the morning after the exam.  No heavy activity (lifting, vacuuming) for 24 hours.    Discomfort: Wear your bra overnight to support the breast.  You may take Tylenol (acetaminophen) for pain.  If you had a stereotactic of MR-directed biopsy, you may take aspirin or ibuprofen (Advil, Motrin) the morning after your biopsy, unless your doctor tells you not to.    Infection: Infection is rare.  Symptoms include fever, redness, increasing pain and fluid draining from the biopsy site.  If you have any of these symptoms, please call the doctor who ordered your exam.    Results: Results may take up to three business days.  If you have not heard your results in three days, call the Breast Center Nurse at [phone] or [phone].  In rare cases, we may need to do another biopsy.    Call the doctor who ordered your exam if:    You have bleeding that lasts more than 20 minutes.    You have pain that cannot be controlled.    You have signs of infection (fever, redness, drainage or other signs).    You have not had your results within three days.    Nurse navigator: Our nurse navigator is here to answer your questions and help you set up future clinic visits.  Please call [phone].    Thank you for choosing [facility].  Please call us if you have questions or concerns about your biopsy.  
-It may be easier to cut the 's fingernails when the  is sleeping. Use a file until you can see that the skin is no longer attached to the nail.

## 2023-01-01 NOTE — TELEPHONE ENCOUNTER
"Patient calling in to breast center - was seen in ED on 1/29 for left breast pain, swelling and started on antibiotics, then to breast center1/30/17 for breast imaging and  abcess drainage and culture- no culture growth so far.   Patient states pain is worse and breast feels harder again and she wants to be seen by a breast \"specialist\" - declined to follow up with her Ob Gyn Dr Antonio . I suggested one of our breast surgeons could follow her for clinical management. Patient will be set up to see Dr Roxi Mcdonnell on Thursday 2/2/17 1PM at the [facility] [address].  is calling patient back to confirm appointment  " [FreeTextEntry1] : PERI is a  day old female who was brought for cardiology consultation due to a fetal echocardiogram which showed an echogenic intracardiac focus in the left ventricle.  The fetal echocardiogram was performed due to limited views of the aortic and ductal arches. She has been thriving at home. She has been feeding without difficulty and gaining weight appropriately.  There has been no tachypnea, increased work of breathing, cyanosis or excessive diaphoresis. \par father - hypercholesterolemia on medication

## 2023-06-03 ENCOUNTER — HEALTH MAINTENANCE LETTER (OUTPATIENT)
Age: 43
End: 2023-06-03

## 2023-07-22 ENCOUNTER — HEALTH MAINTENANCE LETTER (OUTPATIENT)
Age: 43
End: 2023-07-22

## 2023-10-12 ENCOUNTER — ANCILLARY PROCEDURE (OUTPATIENT)
Dept: CT IMAGING | Facility: CLINIC | Age: 43
End: 2023-10-12
Attending: THORACIC SURGERY (CARDIOTHORACIC VASCULAR SURGERY)
Payer: COMMERCIAL

## 2023-10-12 DIAGNOSIS — D49.2 NEOPLASM OF UNSPECIFIED BEHAVIOR OF BONE, SOFT TISSUE, AND SKIN: ICD-10-CM

## 2023-10-12 PROCEDURE — 71250 CT THORAX DX C-: CPT

## 2023-10-25 ENCOUNTER — HOSPITAL ENCOUNTER (OUTPATIENT)
Dept: MAMMOGRAPHY | Facility: CLINIC | Age: 43
Discharge: HOME | End: 2023-10-25
Attending: OBSTETRICS & GYNECOLOGY | Admitting: OBSTETRICS & GYNECOLOGY
Payer: COMMERCIAL

## 2023-10-25 DIAGNOSIS — Z12.31 VISIT FOR SCREENING MAMMOGRAM: ICD-10-CM

## 2023-10-25 PROCEDURE — 77067 SCR MAMMO BI INCL CAD: CPT

## 2024-02-09 ENCOUNTER — TELEPHONE (OUTPATIENT)
Dept: CARDIOLOGY | Facility: CLINIC | Age: 44
End: 2024-02-09
Payer: COMMERCIAL

## 2024-02-09 DIAGNOSIS — I42.2 HYPERTROPHIC CARDIOMYOPATHY (H): Primary | ICD-10-CM

## 2024-02-09 NOTE — TELEPHONE ENCOUNTER
Health Call Center    Phone Message    May a detailed message be left on voicemail: yes     Reason for Call: Symptoms or Concerns     If patient has red-flag symptoms, warm transfer to triage line    Current symptom or concern: Pt requesting a call back to discuss on going and increasing palpitations she has had on and off since last summer which has been creating an increased anxiety situation around it.     Symptoms have been present for:  month(s)    Has patient previously been seen for this? No      Are there any new or worsening symptoms? Yes:     Action Taken: Other: Cardiology    Travel Screening: Not Applicable      Thank you!  [facility]

## 2024-02-09 NOTE — TELEPHONE ENCOUNTER
"I called patient and I discussed her concerns.    This past June her  quit his job which lead to a great deal of stress. Her heart palpitations went 'crazy'.    However, since then her life situation has become less stressful but still notices palpitations.    She last saw Dr. Amos in 3/2021 and after that visit started exercising. In fact, she does a weekly JAMAL program that energized her and helps with stress management.    I reviewed with her the 2019 holter she had that revealed PVC\"s/PAC's which I told her that she probably is still experiencing.    She is past due for an echocardiogram to monitor her HCM and clinic visit with Dr. Amos. I told her that I will place the orders for both. We will have her see Dr. Amos on Thursday 2/29 at 10:15 and scheduling will call her early next week to schedule her echocardiogram.    I asked id she has been monitoring her BP and she has not. i encouraged her to do so 1-2 weeks and to keep a log and bring this to her visit with .    She expressed appreciation for the call back and plan.  "

## 2024-02-26 ENCOUNTER — HOSPITAL ENCOUNTER (OUTPATIENT)
Dept: CARDIOLOGY | Facility: CLINIC | Age: 44
Discharge: HOME | End: 2024-02-26
Attending: INTERNAL MEDICINE | Admitting: INTERNAL MEDICINE
Payer: COMMERCIAL

## 2024-02-26 ENCOUNTER — TELEPHONE (OUTPATIENT)
Dept: CARDIOLOGY | Facility: CLINIC | Age: 44
End: 2024-02-26

## 2024-02-26 DIAGNOSIS — I42.2 HYPERTROPHIC CARDIOMYOPATHY (H): ICD-10-CM

## 2024-02-26 LAB — LVEF ECHO: NORMAL

## 2024-02-26 PROCEDURE — 93306 TTE W/DOPPLER COMPLETE: CPT | Mod: 26 | Performed by: INTERNAL MEDICINE

## 2024-02-26 PROCEDURE — 93306 TTE W/DOPPLER COMPLETE: CPT

## 2024-02-26 NOTE — TELEPHONE ENCOUNTER
Team received VM from Shannen.  She is highly anxious after reading the results on her MyChart from the Echocardiogram today.    She wonders if she needs to go to the ER, or what is happening.   She has upcoming apmnt with Dr. Amos on 2\29.    Echo does show changes since 2019 when she had Echo and MR Cardiac.   She has not returned to clinic since she saw Dr. Amos in 2021.  She has her OB\GYN doctor listed as her PCP, and she confirms that today.    She has sought no treatment or advisement about blood pressure since she was here last and saw Dr. Amos.  He had requested her to keep track of BP and return to us in 6 months.    She has some BP readings at home, and will bring them to the appointment.    Priscilla Pacheco RN on 2/26/2024 at 4:38 PM

## 2024-02-29 ENCOUNTER — OFFICE VISIT (OUTPATIENT)
Dept: CARDIOLOGY | Facility: CLINIC | Age: 44
End: 2024-02-29
Payer: COMMERCIAL

## 2024-02-29 VITALS
BODY MASS INDEX: 26.66 KG/M2 | HEART RATE: 78 BPM | DIASTOLIC BLOOD PRESSURE: 94 MMHG | HEIGHT: 69 IN | WEIGHT: 180 LBS | OXYGEN SATURATION: 99 % | SYSTOLIC BLOOD PRESSURE: 167 MMHG

## 2024-02-29 DIAGNOSIS — I42.2 HYPERTROPHIC CARDIOMYOPATHY (H): ICD-10-CM

## 2024-02-29 PROCEDURE — 99215 OFFICE O/P EST HI 40 MIN: CPT | Performed by: INTERNAL MEDICINE

## 2024-02-29 RX ORDER — METOPROLOL TARTRATE 25 MG/1
25 TABLET, FILM COATED ORAL 2 TIMES DAILY
Status: DISCONTINUED | OUTPATIENT
Start: 2024-02-29 | End: 2024-03-01

## 2024-02-29 NOTE — LETTER
2/29/2024    Sha Antonio MD  [address]  [facility] [address]    RE: Shannen Frank       Dear Colleague,     I had the pleasure of seeing Shannen Frank in the [facility].  HPI and Plan:     It is a pleasure for me to see Shannen again for followup of hypertrophic cardiomyopathy and hypertension.      With cardiac MRI in 2019, there is no significant LVOT obstruction or mitral regurgitation.  Septal thickness is 15 mm, there is a 2% scar burden mainly affecting the inferoseptal at the RV insertion points.      There is no family history of hypertrophic cardiomyopathy or sudden death.  There is a family history of hypertension.  Her mother has been on blood pressure medications since the age of 30.  Sisters have preeclampsia.  The patient herself has had gestational hypertension.     Most recent echocardiography the septum is now 1.5 cm with a peak instantaneous LVOT gradient of 106 mmHg.  Left ventricular systolic function is hyperdynamic, there is 2-3+ mitral regurgitation moderate left atrial enlargement.    She has not been back for follow-up and is she has been eating a very busy life.  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  She had occasional palpitations which lasted no more than 5 seconds.  She recorded an episode when she felt very stressed whilst driving and she experienced palpitations and lightheadedness but no syncope.  With gradual adjustment to increased levels she has not had similar episodes.    She enjoys working out and now participates in high intensity interval training.  She has lost weight and generally feels good though when she initially started she would feel fatigued and mildly lightheaded for hours afterwards.  He does not have exertional syncope chest pains or shortness of breath    Has been monitoring her blood pressures at home which I personally reviewed.  Her blood pressures do have  values in the 150s to 160s probably about 25% of the time and most readings are about 130.    With increasing blood pressure and worsening findings on echocardiography I would strongly recommend starting a beta-blocker.    Tells me that when the echocardiogram was done she had just finished his workout and rush to get to the appointment on time.  Perhaps that may worsen the echo findings a little doubt that because her murmur augments very nicely with Valsalva maneuver which I do not think was present before.    Possible side effects of beta-blockers were discussed.  I will start for 25 mg p.o. twice daily and she will continue to monitor her blood pressures at home.    She does have an iWatch with with the monitoring capabilities advised her to turn this on for self-monitoring of arrhythmias.  Hopefully she will be able to catch some rhythm strips for me to review.    In the meantime I advised against lifting heavy weights as this would worsen the LVOT obstruction and mitral regurgitation I think light weights with more reps should be fine.  Only she is asymptomatic from doing her HIT training.    I will reevaluate her LVOT gradient mitral regurgitation in 3 months time with a repeat echocardiogram.    Look forward to seeing her then.    Orders Placed This Encounter   Procedures    Follow-Up with Cardiology    Echocardiogram Complete       Orders Placed This Encounter   Medications    Multiple Vitamins-Minerals (MULTIVITAMIN ADULTS PO)    metoprolol tartrate (LOPRESSOR) tablet 25 mg       Encounter Diagnosis   Name Primary?    Hypertrophic cardiomyopathy (H)        CURRENT MEDICATIONS:  Current Outpatient Medications   Medication Sig Dispense Refill    levonorgestrel (MIRENA) 20 MCG/24HR IUD 1 each (20 mcg) by Intrauterine route once for 1 dose  0    Loratadine (CLARITIN PO) Take 1 tablet by mouth every evening       Multiple Vitamins-Minerals (MULTIVITAMIN ADULTS PO)       Dextromethorphan-Guaifenesin (MUCINEX DM  PO) Take 1 tablet by mouth every 12 hours  (Patient not taking: Reported on 2/29/2024)      doxycycline hyclate (VIBRAMYCIN) 100 MG capsule 2 pills now (Patient not taking: Reported on 3/12/2021) 2 capsule 0    Pseudoephedrine HCl (SUDAFED CONGESTION PO) Take 1 tablet by mouth daily  (Patient not taking: Reported on 2/29/2024)      triamcinolone (KENALOG) 0.5 % cream Apply topically 2 times daily PRN Eczema (Patient not taking: Reported on 2/29/2024)         ALLERGIES   No Known Allergies    PAST MEDICAL HISTORY:  Past Medical History:   Diagnosis Date    Breast lump     Eczema     Granulomatous mastitis     left breast    Herpes genitalis     Migraine     Migraine with aura        PAST SURGICAL HISTORY:  Past Surgical History:   Procedure Laterality Date    INCISION AND DRAINAGE TRUNK, COMBINED Left 2/3/2017    Procedure: COMBINED INCISION AND DRAINAGE TRUNK;  Surgeon: Roxi Mcdonnell MD;  Location: [facility] OR    REPAIR CHEST WALL Right 2/18/2019    Procedure: RIGHT ANTERIOR CHEST WALL RESECTION AND RECONSTRUCTION;  Surgeon: John English MD;  Location: [facility] OR    TONSILLECTOMY & ADENOIDECTOMY         FAMILY HISTORY:  Family History   Problem Relation Age of Onset    Hypertension Mother     Hypertension Maternal Grandmother     Diabetes Type 2  Maternal Grandfather     Pacemaker Maternal Grandfather     Diabetes Type 2  Maternal Aunt     Cancer Maternal Aunt         gynecologic    Myocardial Infarction No family hx of     Cerebrovascular Disease No family hx of     Coronary Artery Disease Early Onset No family hx of     Colon Cancer No family hx of     Breast Cancer No family hx of        SOCIAL HISTORY:  Social History     Socioeconomic History    Marital status:      Spouse name: None    Number of children: None    Years of education: None    Highest education level: None   Occupational History    Occupation:    Tobacco Use    Smoking status: Former     Types: Cigarettes    Smokeless  "tobacco: Never    Tobacco comments:     social smoking in 20s only   Substance and Sexual Activity    Alcohol use: Yes    Drug use: No    Sexual activity: Yes     Partners: Male     Birth control/protection: I.U.D.     Comment: Mirena placed - November, 2017   Social History Narrative    Engaged (getting  in June, 2018).     Son (4 as of 2017) and step-daughter (19 as of 2017).    Goes to [facility] at least 2x/week.        Review of Systems:  Skin:  Negative     Eyes:  Negative    ENT:  Negative    Respiratory:  Positive for dyspnea on exertion  Cardiovascular:  Negative;chest pain;dizziness;edema;fatigue lightheadedness;palpitations;Positive for  Gastroenterology: Negative    Genitourinary:  Negative    Musculoskeletal:  Negative    Neurologic:  Negative    Psychiatric:  Positive for anxiety  Heme/Lymph/Imm:  Negative    Endocrine:  Negative      Physical Exam:  Vitals: BP (!) 167/94 (BP Location: Left arm, Patient Position: Sitting)   Pulse 78   Ht 1.753 m (5' 9\")   Wt 81.6 kg (180 lb)   SpO2 99%   BMI 26.58 kg/m      Constitutional:  cooperative, alert and oriented, well developed, well nourished, in no acute distress        Skin:  warm and dry to the touch, no apparent skin lesions or masses noted   pacemaker incision in the left infraclavicular area was well-healed      Head:  normocephalic, no masses or lesions        Eyes:  conjunctivae and lids unremarkable        Lymph:No Cervical lymphadenopathy present     ENT:  no pallor or cyanosis, dentition good        Neck:  carotid pulses are full and equal bilaterally, JVP normal, no carotid bruit        Respiratory:  normal breath sounds, clear to auscultation, normal A-P diameter, normal symmetry, normal respiratory excursion, no use of accessory muscles         Cardiac: regular rhythm;normal S1 and S2;apical impulse not displaced       systolic murmur;LLSB        pulses full and equal, no bruits auscultated                                        GI:  abdomen " "soft, non-tender, BS normoactive, no mass, no HSM, no bruits        Extremities and Muscular Skeletal:  no deformities, clubbing, cyanosis, erythema observed              Neurological:  no gross motor deficits        Psych:  Alert and Oriented x 3        Recent Lab Results:  LIPID RESULTS:  Lab Results   Component Value Date    CHOL 213 (H) 11/22/2017    HDL 76 11/22/2017    LDL 99 11/22/2017    TRIG 188 (H) 11/22/2017       LIVER ENZYME RESULTS:  Lab Results   Component Value Date    AST 8 03/20/2017    ALT 21 03/20/2017       CBC RESULTS:  Lab Results   Component Value Date    WBC 6.3 02/18/2019    RBC 4.21 02/18/2019    HGB 12.9 02/18/2019    HCT 38.5 02/18/2019    MCV 91 02/18/2019    MCH 30.6 02/18/2019    MCHC 33.5 02/18/2019    RDW 12.1 02/18/2019     02/18/2019       BMP RESULTS:  Lab Results   Component Value Date     02/18/2019    POTASSIUM 3.8 02/18/2019    CHLORIDE 108 02/18/2019    CO2 27 02/18/2019    ANIONGAP 5 02/18/2019     (H) 02/18/2019    BUN 14 02/18/2019    CR 0.79 02/18/2019    GFRESTIMATED >90 02/18/2019    GFRESTBLACK >90 02/18/2019    GLORY 8.6 02/18/2019        A1C RESULTS:  No results found for: \"A1C\"    INR RESULTS:  Lab Results   Component Value Date    INR 0.95 02/18/2019           CC  Anny Amos MD  [address]  [place] [id number]      Thank you for allowing me to participate in the care of your patient.      Sincerely,     DR ANNY AMOS MD     [facility]  "

## 2024-02-29 NOTE — PROGRESS NOTES
HPI and Plan:     It is a pleasure for me to see Shannen again for followup of hypertrophic cardiomyopathy and hypertension.      With cardiac MRI in 2019, there is no significant LVOT obstruction or mitral regurgitation.  Septal thickness is 15 mm, there is a 2% scar burden mainly affecting the inferoseptal at the RV insertion points.      There is no family history of hypertrophic cardiomyopathy or sudden death.  There is a family history of hypertension.  Her mother has been on blood pressure medications since the age of 30.  Sisters have preeclampsia.  The patient herself has had gestational hypertension.     Most recent echocardiography the septum is now 1.5 cm with a peak instantaneous LVOT gradient of 106 mmHg.  Left ventricular systolic function is hyperdynamic, there is 2-3+ mitral regurgitation moderate left atrial enlargement.    She has not been back for follow-up and is she has been eating a very busy life.  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  She had occasional palpitations which lasted no more than 5 seconds.  She recorded an episode when she felt very stressed whilst driving and she experienced palpitations and lightheadedness but no syncope.  With gradual adjustment to increased levels she has not had similar episodes.    She enjoys working out and now participates in high intensity interval training.  She has lost weight and generally feels good though when she initially started she would feel fatigued and mildly lightheaded for hours afterwards.  He does not have exertional syncope chest pains or shortness of breath    Has been monitoring her blood pressures at home which I personally reviewed.  Her blood pressures do have values in the 150s to 160s probably about 25% of the time and most readings are about 130.    With increasing blood pressure and worsening findings on echocardiography I would strongly recommend starting a  beta-blocker.    Tells me that when the echocardiogram was done she had just finished his workout and rush to get to the appointment on time.  Perhaps that may worsen the echo findings a little doubt that because her murmur augments very nicely with Valsalva maneuver which I do not think was present before.    Possible side effects of beta-blockers were discussed.  I will start for 25 mg p.o. twice daily and she will continue to monitor her blood pressures at home.    She does have an iWatch with with the monitoring capabilities advised her to turn this on for self-monitoring of arrhythmias.  Hopefully she will be able to catch some rhythm strips for me to review.    In the meantime I advised against lifting heavy weights as this would worsen the LVOT obstruction and mitral regurgitation I think light weights with more reps should be fine.  Only she is asymptomatic from doing her HIT training.    I will reevaluate her LVOT gradient mitral regurgitation in 3 months time with a repeat echocardiogram.    Look forward to seeing her then.    Orders Placed This Encounter   Procedures    Follow-Up with Cardiology    Echocardiogram Complete       Orders Placed This Encounter   Medications    Multiple Vitamins-Minerals (MULTIVITAMIN ADULTS PO)    metoprolol tartrate (LOPRESSOR) tablet 25 mg       Encounter Diagnosis   Name Primary?    Hypertrophic cardiomyopathy (H)        CURRENT MEDICATIONS:  Current Outpatient Medications   Medication Sig Dispense Refill    levonorgestrel (MIRENA) 20 MCG/24HR IUD 1 each (20 mcg) by Intrauterine route once for 1 dose  0    Loratadine (CLARITIN PO) Take 1 tablet by mouth every evening       Multiple Vitamins-Minerals (MULTIVITAMIN ADULTS PO)       Dextromethorphan-Guaifenesin (MUCINEX DM PO) Take 1 tablet by mouth every 12 hours  (Patient not taking: Reported on 2/29/2024)      doxycycline hyclate (VIBRAMYCIN) 100 MG capsule 2 pills now (Patient not taking: Reported on 3/12/2021) 2 capsule  0    Pseudoephedrine HCl (SUDAFED CONGESTION PO) Take 1 tablet by mouth daily  (Patient not taking: Reported on 2/29/2024)      triamcinolone (KENALOG) 0.5 % cream Apply topically 2 times daily PRN Eczema (Patient not taking: Reported on 2/29/2024)         ALLERGIES   No Known Allergies    PAST MEDICAL HISTORY:  Past Medical History:   Diagnosis Date    Breast lump     Eczema     Granulomatous mastitis     left breast    Herpes genitalis     Migraine     Migraine with aura        PAST SURGICAL HISTORY:  Past Surgical History:   Procedure Laterality Date    INCISION AND DRAINAGE TRUNK, COMBINED Left 2/3/2017    Procedure: COMBINED INCISION AND DRAINAGE TRUNK;  Surgeon: Roxi Mcdonnell MD;  Location: [facility] OR    REPAIR CHEST WALL Right 2/18/2019    Procedure: RIGHT ANTERIOR CHEST WALL RESECTION AND RECONSTRUCTION;  Surgeon: John English MD;  Location: [facility] OR    TONSILLECTOMY & ADENOIDECTOMY         FAMILY HISTORY:  Family History   Problem Relation Age of Onset    Hypertension Mother     Hypertension Maternal Grandmother     Diabetes Type 2  Maternal Grandfather     Pacemaker Maternal Grandfather     Diabetes Type 2  Maternal Aunt     Cancer Maternal Aunt         gynecologic    Myocardial Infarction No family hx of     Cerebrovascular Disease No family hx of     Coronary Artery Disease Early Onset No family hx of     Colon Cancer No family hx of     Breast Cancer No family hx of        SOCIAL HISTORY:  Social History     Socioeconomic History    Marital status:      Spouse name: None    Number of children: None    Years of education: None    Highest education level: None   Occupational History    Occupation:    Tobacco Use    Smoking status: Former     Types: Cigarettes    Smokeless tobacco: Never    Tobacco comments:     social smoking in 20s only   Substance and Sexual Activity    Alcohol use: Yes    Drug use: No    Sexual activity: Yes     Partners: Male     Birth control/protection:  "I.U.D.     Comment: Mirena placed - November, 2017   Social History Narrative    Engaged (getting  in June, 2018).     Son (4 as of 2017) and step-daughter (19 as of 2017).    Goes to [employer] at least 2x/week.        Review of Systems:  Skin:  Negative     Eyes:  Negative    ENT:  Negative    Respiratory:  Positive for dyspnea on exertion  Cardiovascular:  Negative;chest pain;dizziness;edema;fatigue lightheadedness;palpitations;Positive for  Gastroenterology: Negative    Genitourinary:  Negative    Musculoskeletal:  Negative    Neurologic:  Negative    Psychiatric:  Positive for anxiety  Heme/Lymph/Imm:  Negative    Endocrine:  Negative      Physical Exam:  Vitals: BP (!) 167/94 (BP Location: Left arm, Patient Position: Sitting)   Pulse 78   Ht 1.753 m (5' 9\")   Wt 81.6 kg (180 lb)   SpO2 99%   BMI 26.58 kg/m      Constitutional:  cooperative, alert and oriented, well developed, well nourished, in no acute distress        Skin:  warm and dry to the touch, no apparent skin lesions or masses noted   pacemaker incision in the left infraclavicular area was well-healed      Head:  normocephalic, no masses or lesions        Eyes:  conjunctivae and lids unremarkable        Lymph:No Cervical lymphadenopathy present     ENT:  no pallor or cyanosis, dentition good        Neck:  carotid pulses are full and equal bilaterally, JVP normal, no carotid bruit        Respiratory:  normal breath sounds, clear to auscultation, normal A-P diameter, normal symmetry, normal respiratory excursion, no use of accessory muscles         Cardiac: regular rhythm;normal S1 and S2;apical impulse not displaced       systolic murmur;LLSB        pulses full and equal, no bruits auscultated                                        GI:  abdomen soft, non-tender, BS normoactive, no mass, no HSM, no bruits        Extremities and Muscular Skeletal:  no deformities, clubbing, cyanosis, erythema observed              Neurological:  no gross motor " "deficits        Psych:  Alert and Oriented x 3        Recent Lab Results:  LIPID RESULTS:  Lab Results   Component Value Date    CHOL 213 (H) 11/22/2017    HDL 76 11/22/2017    LDL 99 11/22/2017    TRIG 188 (H) 11/22/2017       LIVER ENZYME RESULTS:  Lab Results   Component Value Date    AST 8 03/20/2017    ALT 21 03/20/2017       CBC RESULTS:  Lab Results   Component Value Date    WBC 6.3 02/18/2019    RBC 4.21 02/18/2019    HGB 12.9 02/18/2019    HCT 38.5 02/18/2019    MCV 91 02/18/2019    MCH 30.6 02/18/2019    MCHC 33.5 02/18/2019    RDW 12.1 02/18/2019     02/18/2019       BMP RESULTS:  Lab Results   Component Value Date     02/18/2019    POTASSIUM 3.8 02/18/2019    CHLORIDE 108 02/18/2019    CO2 27 02/18/2019    ANIONGAP 5 02/18/2019     (H) 02/18/2019    BUN 14 02/18/2019    CR 0.79 02/18/2019    GFRESTIMATED >90 02/18/2019    GFRESTBLACK >90 02/18/2019    GLORY 8.6 02/18/2019        A1C RESULTS:  No results found for: \"A1C\"    INR RESULTS:  Lab Results   Component Value Date    INR 0.95 02/18/2019           CC  Guevara Amos MD  [id number]  [place] [id number]                 "

## 2024-03-01 DIAGNOSIS — I42.2 HYPERTROPHIC CARDIOMYOPATHY (H): Primary | ICD-10-CM

## 2024-03-01 RX ORDER — METOPROLOL TARTRATE 25 MG/1
TABLET, FILM COATED ORAL
Qty: 180 TABLET | Refills: 3 | Status: SHIPPED | OUTPATIENT
Start: 2024-03-01

## 2024-07-06 ENCOUNTER — HEALTH MAINTENANCE LETTER (OUTPATIENT)
Age: 44
End: 2024-07-06

## 2025-02-27 DIAGNOSIS — I42.2 HYPERTROPHIC CARDIOMYOPATHY (H): ICD-10-CM

## 2025-02-27 RX ORDER — METOPROLOL TARTRATE 25 MG/1
TABLET, FILM COATED ORAL
Qty: 180 TABLET | Refills: 0 | Status: SHIPPED | OUTPATIENT
Start: 2025-02-27

## 2025-03-13 ENCOUNTER — ANCILLARY PROCEDURE (OUTPATIENT)
Dept: CT IMAGING | Facility: CLINIC | Age: 45
End: 2025-03-13
Attending: THORACIC SURGERY (CARDIOTHORACIC VASCULAR SURGERY)
Payer: COMMERCIAL

## 2025-03-13 DIAGNOSIS — D49.2 NEOPLASM OF SOFT TISSUE: ICD-10-CM

## 2025-03-13 DIAGNOSIS — D48.119 DESMOID TUMOR: ICD-10-CM

## 2025-03-13 DIAGNOSIS — D49.2 NEOPLASM OF SKIN: ICD-10-CM

## 2025-03-13 DIAGNOSIS — D49.2 NEOPLASM OF BONE: ICD-10-CM

## 2025-03-13 PROCEDURE — 71250 CT THORAX DX C-: CPT

## 2025-03-17 ENCOUNTER — HOSPITAL ENCOUNTER (OUTPATIENT)
Dept: CARDIOLOGY | Facility: CLINIC | Age: 45
Discharge: HOME | End: 2025-03-17
Attending: INTERNAL MEDICINE | Admitting: INTERNAL MEDICINE
Payer: COMMERCIAL

## 2025-03-17 DIAGNOSIS — I42.2 HYPERTROPHIC CARDIOMYOPATHY (H): ICD-10-CM

## 2025-03-17 LAB — LVEF ECHO: NORMAL

## 2025-03-17 PROCEDURE — 93306 TTE W/DOPPLER COMPLETE: CPT | Mod: 26 | Performed by: INTERNAL MEDICINE

## 2025-03-17 PROCEDURE — 93306 TTE W/DOPPLER COMPLETE: CPT

## 2025-03-20 ENCOUNTER — VIRTUAL VISIT (OUTPATIENT)
Dept: INTERNAL MEDICINE | Facility: CLINIC | Age: 45
End: 2025-03-20
Payer: COMMERCIAL

## 2025-03-20 DIAGNOSIS — F43.22 ADJUSTMENT DISORDER WITH ANXIOUS MOOD: Primary | ICD-10-CM

## 2025-03-20 RX ORDER — CETIRIZINE HYDROCHLORIDE 10 MG/1
10 TABLET ORAL EVERY MORNING
COMMUNITY

## 2025-03-20 RX ORDER — ESCITALOPRAM OXALATE 10 MG/1
TABLET ORAL
Qty: 30 TABLET | Refills: 1 | Status: SHIPPED | OUTPATIENT
Start: 2025-03-20 | End: 2027-12-20

## 2025-03-20 ASSESSMENT — ANXIETY QUESTIONNAIRES
GAD7 TOTAL SCORE: 10
2. NOT BEING ABLE TO STOP OR CONTROL WORRYING: SEVERAL DAYS
3. WORRYING TOO MUCH ABOUT DIFFERENT THINGS: MORE THAN HALF THE DAYS
8. IF YOU CHECKED OFF ANY PROBLEMS, HOW DIFFICULT HAVE THESE MADE IT FOR YOU TO DO YOUR WORK, TAKE CARE OF THINGS AT HOME, OR GET ALONG WITH OTHER PEOPLE?: VERY DIFFICULT
4. TROUBLE RELAXING: SEVERAL DAYS
GAD7 TOTAL SCORE: 10
IF YOU CHECKED OFF ANY PROBLEMS ON THIS QUESTIONNAIRE, HOW DIFFICULT HAVE THESE PROBLEMS MADE IT FOR YOU TO DO YOUR WORK, TAKE CARE OF THINGS AT HOME, OR GET ALONG WITH OTHER PEOPLE: VERY DIFFICULT
5. BEING SO RESTLESS THAT IT IS HARD TO SIT STILL: NOT AT ALL
1. FEELING NERVOUS, ANXIOUS, OR ON EDGE: MORE THAN HALF THE DAYS
6. BECOMING EASILY ANNOYED OR IRRITABLE: SEVERAL DAYS
7. FEELING AFRAID AS IF SOMETHING AWFUL MIGHT HAPPEN: NEARLY EVERY DAY

## 2025-03-20 NOTE — PROGRESS NOTES
Shannen is a 44 year old who is being evaluated via a billable video visit.    How would you like to obtain your AVS? MyChart  If the video visit is dropped, the invitation should be resent by: Text to cell phone: [phone]  Will anyone else be joining your video visit? No      Assessment & Plan     Adjustment disorder with anxious mood  Start ssri  [phone]  If prns needed for acute anxiety - look at either benzos or gabapentin   For sleep would be ok with limited use of ambien or other z drugs.    F/u 1 month , would anticipate her course will be somewhat up and down given the outside stressors that are going to be active in her life for the foreseeable future.   - escitalopram (LEXAPRO) 10 MG tablet; Take 0.5 tablets (5 mg) by mouth daily for 6 days, THEN 1 tablet (10 mg) daily.      I spent a total of 45 minutes on the day of the visit.   Time spent by me today doing chart review, history and exam, documentation and further activities per the note      Subjective   Shannen is a 44 year old, presenting for the following health issues:  Anxiety    She was last seen here in 2017.   She presents with noted considerable increase in anxiety. She reports panic like attacks and a general sense of increased in anxiety over the last few months.  This time has been quite turbulent for the pt due to multiple stressors.  Her  was dx with stage 4 pancreatic cancer and her father just  less than 2 wks ago suddenly.     History of Present Illness       Mental Health Follow-up:  Patient presents to follow-up on Depression & Anxiety.Patient's depression since last visit has been:  Worse  The patient is having other symptoms associated with depression.  Patient's anxiety since last visit has been:  Worse  The patient is having other symptoms associated with anxiety.  Any significant life events: grief or loss and other  Patient is feeling anxious or  "having panic attacks.  Patient has no concerns about alcohol or drug use.                    Objective    Vitals - Patient Reported  Weight (Patient Reported): 79.4 kg (175 lb)  Height (Patient Reported): 175.3 cm (5' 9\")  BMI (Based on Pt Reported Ht/Wt): 25.84      Vitals:  No vitals were obtained today due to virtual visit.    Physical Exam   GENERAL: alert and no distress  PSYCH: Appropriate affect, tone, and pace of words          Video-Visit Details    Type of service:  Video Visit   Originating Location (pt. Location): Home    Distant Location (provider location):  On-site  Platform used for Video Visit: Leann  Signed Electronically by: Greg Navarro MD    "

## 2025-03-24 ENCOUNTER — PATIENT OUTREACH (OUTPATIENT)
Dept: CARE COORDINATION | Facility: CLINIC | Age: 45
End: 2025-03-24
Payer: COMMERCIAL

## 2025-05-19 ENCOUNTER — MYC REFILL (OUTPATIENT)
Dept: INTERNAL MEDICINE | Facility: CLINIC | Age: 45
End: 2025-05-19
Payer: COMMERCIAL

## 2025-05-19 ENCOUNTER — MYC REFILL (OUTPATIENT)
Dept: CARDIOLOGY | Facility: CLINIC | Age: 45
End: 2025-05-19
Payer: COMMERCIAL

## 2025-05-19 DIAGNOSIS — I42.2 HYPERTROPHIC CARDIOMYOPATHY (H): ICD-10-CM

## 2025-05-19 DIAGNOSIS — F43.22 ADJUSTMENT DISORDER WITH ANXIOUS MOOD: ICD-10-CM

## 2025-05-19 RX ORDER — METOPROLOL TARTRATE 25 MG/1
TABLET, FILM COATED ORAL
Qty: 180 TABLET | Refills: 0 | Status: SHIPPED | OUTPATIENT
Start: 2025-05-19

## 2025-05-19 RX ORDER — ESCITALOPRAM OXALATE 10 MG/1
TABLET ORAL
Qty: 30 TABLET | Refills: 1 | OUTPATIENT
Start: 2025-05-19 | End: 2028-02-18

## 2025-05-20 NOTE — TELEPHONE ENCOUNTER
"Received a call from the patient. Patient states she is currently taking 10 mg daily. Patient states she did take the 5 mg for about 8-9 days due to some diarrhea but this \"self corrected itself\" and she has been tolerating the 10 mg dose. Patient states the only thing she has noticed on this dose is some   short term memory issues (similar to when she was taking Sertraline). However, patient states \"it is working really well and it is doing it's job. I do not need more and I do not need less.\" Patient states she has not had any panic attacks since starting this medication.     Updated script pended for review and will route HP to PCP for approval.     Thank you,  Rosa Kraft RN  "

## 2025-05-21 RX ORDER — ESCITALOPRAM OXALATE 10 MG/1
10 TABLET ORAL DAILY
Qty: 90 TABLET | Refills: 1 | Status: SHIPPED | OUTPATIENT
Start: 2025-05-21

## 2025-07-13 ENCOUNTER — HEALTH MAINTENANCE LETTER (OUTPATIENT)
Age: 45
End: 2025-07-13

## 2025-07-29 ENCOUNTER — TELEPHONE (OUTPATIENT)
Dept: CARDIOLOGY | Facility: CLINIC | Age: 45
End: 2025-07-29
Payer: COMMERCIAL

## 2025-07-29 DIAGNOSIS — I42.2 HYPERTROPHIC CARDIOMYOPATHY (H): Primary | ICD-10-CM

## 2025-07-29 NOTE — TELEPHONE ENCOUNTER
[facility] Call Center    Phone Message    May a detailed message be left on voicemail: yes     Reason for Call: Medication Refill Request    Has the patient contacted the pharmacy for the refill? Yes   Name of medication being requested: Metoprolol 25 mg  Provider who prescribed the medication: Dr Amos  Pharmacy: [facility]  Date medication is needed: 07/29/2025       Action Taken: Other: Cardio    Travel Screening: Not Applicable     Date of Service:

## 2025-07-29 NOTE — TELEPHONE ENCOUNTER
Writer called to patient, had to LVM.   Informed her that she needs to make an appointment, as instructed on 5\19\25 when she previously called for refill.    Order placed so there is a new updated order to use.    Provider her with  line and asked her to call today, then call our nurse line to verify that she got an appmnt.  Instructed it can be with any STACY.       Team 3 line, [phone].    Priscilla Pacheco RN on 7/29/2025 at 12:24 PM

## 2025-07-30 NOTE — TELEPHONE ENCOUNTER
[facility] Call Center    Phone Message    May a detailed message be left on voicemail: yes     Reason for Call: Other: Pt is now scheduled 09/23/2025.  Pt requesting refill be sent for  Metoprolol 25 mg.     Action Taken: Other: Cardiology    Travel Screening: Not Applicable     Thank you!  [facility]

## 2025-07-31 RX ORDER — METOPROLOL TARTRATE 25 MG/1
TABLET, FILM COATED ORAL
Qty: 180 TABLET | Refills: 0 | Status: SHIPPED | OUTPATIENT
Start: 2025-07-31

## 2025-07-31 NOTE — TELEPHONE ENCOUNTER
Appointment scheduled with Rufus Low CNP 9/23/25  Refills provided  Naye Jordan RN on 7/31/2025 at 9:32 AM

## (undated) DEVICE — GOWN IMPERVIOUS ZONED LG

## (undated) DEVICE — SU VICRYL 2-0 CT-1 27" J339H

## (undated) DEVICE — CLIP APPLIER 11" MED LIGACLIP MCM20

## (undated) DEVICE — PREP CHLORAPREP 26ML TINTED ORANGE  260815

## (undated) DEVICE — LINEN TOWEL PACK X5 5464

## (undated) DEVICE — DRSG GAUZE 4X4" 3033

## (undated) DEVICE — DRAPE BREAST/CHEST 29420

## (undated) DEVICE — SU VICRYL 3-0 SH 27" J316H

## (undated) DEVICE — SOL WATER IRRIG 1000ML BOTTLE 2F7114

## (undated) DEVICE — BLADE KNIFE SURG 10 371110

## (undated) DEVICE — GLOVE PROTEXIS BLUE W/NEU-THERA 6.5  2D73EB65

## (undated) DEVICE — SPONGE LAP 18X18" X8435

## (undated) DEVICE — GLOVE PROTEXIS W/NEU-THERA 6.5  2D73TE65

## (undated) DEVICE — TUBE SMOKE EVAC 7/8"X10 (STERILE)

## (undated) DEVICE — SU SILK 2 REEL 60" SA8H

## (undated) DEVICE — DRSG STERI STRIP 1/2X4" R1547

## (undated) DEVICE — TUBING SUCTION MEDI-VAC SOFT 3/16"X20' N520A

## (undated) DEVICE — DRAIN JACKSON PRATT 10MM FLAT 3/4 PERF

## (undated) DEVICE — DRSG KERLIX FLUFFS X5

## (undated) DEVICE — DRSG STERI STRIP 1/4X3" R1541

## (undated) DEVICE — CATH ON-Q PAIN SILVER SKR 2.5" PM010-A

## (undated) DEVICE — BLADE KNIFE SURG 15 371115

## (undated) DEVICE — ESU GROUND PAD UNIVERSAL W/O CORD

## (undated) DEVICE — GLOVE PROTEXIS MICRO 6.0  2D73PM60

## (undated) DEVICE — SUCTION CANISTER MEDIVAC LINER 3000ML W/LID 65651-530

## (undated) DEVICE — SU NDL CUT REV MED 3/8 209014

## (undated) DEVICE — SU MONOCRYL 4-0 PS-2 18" UND Y496G

## (undated) DEVICE — SU VICRYL 4-0 PS-2 18" UND J496H

## (undated) DEVICE — DRAPE SLEEVE MEDT STERILE 10FT 6177

## (undated) DEVICE — ESU ELEC BLADE 2.75" COATED/INSULATED E1455

## (undated) DEVICE — PACK MINOR SBA15MIFSE

## (undated) DEVICE — TUBING SUCTION 12"X1/4" N612

## (undated) DEVICE — SU PROLENE 4-0 RB-1DA 36" 8557H

## (undated) DEVICE — NDL 22GA 1.5"

## (undated) DEVICE — SYR BULB IRRIG 50ML LATEX FREE 0035280

## (undated) DEVICE — SU PROLENE 3-0 V-7DA 36" 8976H

## (undated) DEVICE — DRAIN PENROSE 0.75"X18" LATEX FREE GR205

## (undated) DEVICE — DRAIN JACKSON PRATT RESERVOIR 100ML SU130-1305

## (undated) DEVICE — GLOVE PROTEXIS W/NEU-THERA 7.5  2D73TE75

## (undated) DEVICE — PAD CHUX UNDERPAD 23X24" 7136

## (undated) DEVICE — DRAPE IOBAN INCISE 23X17" 6650EZ

## (undated) DEVICE — PACKING NUGAUZE 1/4" PLAIN 7631

## (undated) DEVICE — DECANTER VIAL 2006S

## (undated) DEVICE — SU ETHILON 3-0 FS-1 18" 669H

## (undated) DEVICE — SOL NACL 0.9% IRRIG 1000ML BOTTLE 07138-09

## (undated) DEVICE — SU SILK 2-0 TIE 24" SA75H

## (undated) DEVICE — DRAPE POUCH INSTRUMENT 1018

## (undated) RX ORDER — NEOSTIGMINE METHYLSULFATE 1 MG/ML
VIAL (ML) INJECTION
Status: DISPENSED
Start: 2019-02-18

## (undated) RX ORDER — LIDOCAINE HYDROCHLORIDE 20 MG/ML
INJECTION, SOLUTION EPIDURAL; INFILTRATION; INTRACAUDAL; PERINEURAL
Status: DISPENSED
Start: 2017-02-03

## (undated) RX ORDER — HYDROMORPHONE HYDROCHLORIDE 1 MG/ML
INJECTION, SOLUTION INTRAMUSCULAR; INTRAVENOUS; SUBCUTANEOUS
Status: DISPENSED
Start: 2019-02-18

## (undated) RX ORDER — HYDROMORPHONE HYDROCHLORIDE 1 MG/ML
INJECTION, SOLUTION INTRAMUSCULAR; INTRAVENOUS; SUBCUTANEOUS
Status: DISPENSED
Start: 2017-02-03

## (undated) RX ORDER — FENTANYL CITRATE 50 UG/ML
INJECTION, SOLUTION INTRAMUSCULAR; INTRAVENOUS
Status: DISPENSED
Start: 2017-02-03

## (undated) RX ORDER — DEXAMETHASONE SODIUM PHOSPHATE 4 MG/ML
INJECTION, SOLUTION INTRA-ARTICULAR; INTRALESIONAL; INTRAMUSCULAR; INTRAVENOUS; SOFT TISSUE
Status: DISPENSED
Start: 2019-02-18

## (undated) RX ORDER — CEFAZOLIN SODIUM 2 G/100ML
INJECTION, SOLUTION INTRAVENOUS
Status: DISPENSED
Start: 2017-02-03

## (undated) RX ORDER — PROPOFOL 10 MG/ML
INJECTION, EMULSION INTRAVENOUS
Status: DISPENSED
Start: 2017-02-03

## (undated) RX ORDER — BUPIVACAINE HYDROCHLORIDE 5 MG/ML
INJECTION, SOLUTION EPIDURAL; INTRACAUDAL
Status: DISPENSED
Start: 2019-02-18

## (undated) RX ORDER — GLYCOPYRROLATE 0.2 MG/ML
INJECTION, SOLUTION INTRAMUSCULAR; INTRAVENOUS
Status: DISPENSED
Start: 2019-02-18

## (undated) RX ORDER — PROPOFOL 10 MG/ML
INJECTION, EMULSION INTRAVENOUS
Status: DISPENSED
Start: 2019-02-18

## (undated) RX ORDER — ONDANSETRON 2 MG/ML
INJECTION INTRAMUSCULAR; INTRAVENOUS
Status: DISPENSED
Start: 2019-02-18

## (undated) RX ORDER — LIDOCAINE HYDROCHLORIDE 20 MG/ML
INJECTION, SOLUTION EPIDURAL; INFILTRATION; INTRACAUDAL; PERINEURAL
Status: DISPENSED
Start: 2019-02-18

## (undated) RX ORDER — ONDANSETRON 2 MG/ML
INJECTION INTRAMUSCULAR; INTRAVENOUS
Status: DISPENSED
Start: 2017-02-03

## (undated) RX ORDER — CEFAZOLIN SODIUM 2 G/100ML
INJECTION, SOLUTION INTRAVENOUS
Status: DISPENSED
Start: 2019-02-18

## (undated) RX ORDER — FENTANYL CITRATE 50 UG/ML
INJECTION, SOLUTION INTRAMUSCULAR; INTRAVENOUS
Status: DISPENSED
Start: 2019-02-18

## (undated) RX ORDER — DIAZEPAM 5 MG
TABLET ORAL
Status: DISPENSED
Start: 2019-03-25

## (undated) RX ORDER — DEXAMETHASONE SODIUM PHOSPHATE 4 MG/ML
INJECTION, SOLUTION INTRA-ARTICULAR; INTRALESIONAL; INTRAMUSCULAR; INTRAVENOUS; SOFT TISSUE
Status: DISPENSED
Start: 2017-02-03

## (undated) RX ORDER — BUPIVACAINE HYDROCHLORIDE 2.5 MG/ML
INJECTION, SOLUTION EPIDURAL; INFILTRATION; INTRACAUDAL
Status: DISPENSED
Start: 2017-02-03

## (undated) RX ORDER — HYDROCODONE BITARTRATE AND ACETAMINOPHEN 5; 325 MG/1; MG/1
TABLET ORAL
Status: DISPENSED
Start: 2017-02-03